# Patient Record
Sex: MALE | Race: WHITE | NOT HISPANIC OR LATINO | URBAN - METROPOLITAN AREA
[De-identification: names, ages, dates, MRNs, and addresses within clinical notes are randomized per-mention and may not be internally consistent; named-entity substitution may affect disease eponyms.]

---

## 2018-06-05 ENCOUNTER — EMERGENCY (EMERGENCY)
Facility: HOSPITAL | Age: 58
LOS: 1 days | Discharge: HOME | End: 2018-06-05
Attending: EMERGENCY MEDICINE
Payer: COMMERCIAL

## 2018-06-05 VITALS — HEIGHT: 70 IN | WEIGHT: 160.06 LBS

## 2018-06-05 VITALS
OXYGEN SATURATION: 96 % | HEART RATE: 99 BPM | TEMPERATURE: 98 F | DIASTOLIC BLOOD PRESSURE: 65 MMHG | RESPIRATION RATE: 16 BRPM | SYSTOLIC BLOOD PRESSURE: 135 MMHG

## 2018-06-05 LAB
ALBUMIN SERPL ELPH-MCNC: 4.3 G/DL — SIGNIFICANT CHANGE UP (ref 3.3–5.2)
ALP SERPL-CCNC: 124 U/L — HIGH (ref 40–120)
ALT FLD-CCNC: 45 U/L — HIGH
ANION GAP SERPL CALC-SCNC: 14 MMOL/L — SIGNIFICANT CHANGE UP (ref 5–17)
APPEARANCE UR: CLEAR — SIGNIFICANT CHANGE UP
AST SERPL-CCNC: 73 U/L — HIGH
BACTERIA # UR AUTO: ABNORMAL
BASOPHILS # BLD AUTO: 0 K/UL — SIGNIFICANT CHANGE UP (ref 0–0.2)
BASOPHILS NFR BLD AUTO: 0.4 % — SIGNIFICANT CHANGE UP (ref 0–2)
BILIRUB SERPL-MCNC: 2.5 MG/DL — HIGH (ref 0.4–2)
BILIRUB UR-MCNC: NEGATIVE — SIGNIFICANT CHANGE UP
BUN SERPL-MCNC: 9 MG/DL — SIGNIFICANT CHANGE UP (ref 8–20)
CALCIUM SERPL-MCNC: 9.4 MG/DL — SIGNIFICANT CHANGE UP (ref 8.6–10.2)
CHLORIDE SERPL-SCNC: 102 MMOL/L — SIGNIFICANT CHANGE UP (ref 98–107)
CO2 SERPL-SCNC: 25 MMOL/L — SIGNIFICANT CHANGE UP (ref 22–29)
COLOR SPEC: YELLOW — SIGNIFICANT CHANGE UP
CREAT SERPL-MCNC: 0.66 MG/DL — SIGNIFICANT CHANGE UP (ref 0.5–1.3)
DIFF PNL FLD: NEGATIVE — SIGNIFICANT CHANGE UP
EOSINOPHIL # BLD AUTO: 0.1 K/UL — SIGNIFICANT CHANGE UP (ref 0–0.5)
EOSINOPHIL NFR BLD AUTO: 1.1 % — SIGNIFICANT CHANGE UP (ref 0–5)
EPI CELLS # UR: SIGNIFICANT CHANGE UP
GLUCOSE SERPL-MCNC: 92 MG/DL — SIGNIFICANT CHANGE UP (ref 70–115)
GLUCOSE UR QL: NEGATIVE MG/DL — SIGNIFICANT CHANGE UP
HCT VFR BLD CALC: 42.2 % — SIGNIFICANT CHANGE UP (ref 42–52)
HGB BLD-MCNC: 14.3 G/DL — SIGNIFICANT CHANGE UP (ref 14–18)
KETONES UR-MCNC: ABNORMAL
LEUKOCYTE ESTERASE UR-ACNC: ABNORMAL
LYMPHOCYTES # BLD AUTO: 1.4 K/UL — SIGNIFICANT CHANGE UP (ref 1–4.8)
LYMPHOCYTES # BLD AUTO: 17.5 % — LOW (ref 20–55)
MCHC RBC-ENTMCNC: 33.9 G/DL — SIGNIFICANT CHANGE UP (ref 32–36)
MCHC RBC-ENTMCNC: 35.8 PG — HIGH (ref 27–31)
MCV RBC AUTO: 105.5 FL — HIGH (ref 80–94)
MONOCYTES # BLD AUTO: 0.8 K/UL — SIGNIFICANT CHANGE UP (ref 0–0.8)
MONOCYTES NFR BLD AUTO: 9.6 % — SIGNIFICANT CHANGE UP (ref 3–10)
NEUTROPHILS # BLD AUTO: 5.9 K/UL — SIGNIFICANT CHANGE UP (ref 1.8–8)
NEUTROPHILS NFR BLD AUTO: 71.2 % — SIGNIFICANT CHANGE UP (ref 37–73)
NITRITE UR-MCNC: NEGATIVE — SIGNIFICANT CHANGE UP
PH UR: 7 — SIGNIFICANT CHANGE UP (ref 5–8)
PLATELET # BLD AUTO: 228 K/UL — SIGNIFICANT CHANGE UP (ref 150–400)
POTASSIUM SERPL-MCNC: 4.8 MMOL/L — SIGNIFICANT CHANGE UP (ref 3.5–5.3)
POTASSIUM SERPL-SCNC: 4.8 MMOL/L — SIGNIFICANT CHANGE UP (ref 3.5–5.3)
PROT SERPL-MCNC: 7.4 G/DL — SIGNIFICANT CHANGE UP (ref 6.6–8.7)
PROT UR-MCNC: 15 MG/DL
RBC # BLD: 4 M/UL — LOW (ref 4.6–6.2)
RBC # FLD: 13.3 % — SIGNIFICANT CHANGE UP (ref 11–15.6)
RBC CASTS # UR COMP ASSIST: SIGNIFICANT CHANGE UP /HPF (ref 0–4)
SODIUM SERPL-SCNC: 141 MMOL/L — SIGNIFICANT CHANGE UP (ref 135–145)
SP GR SPEC: 1 — LOW (ref 1.01–1.02)
UROBILINOGEN FLD QL: 1 MG/DL
WBC # BLD: 8.3 K/UL — SIGNIFICANT CHANGE UP (ref 4.8–10.8)
WBC # FLD AUTO: 8.3 K/UL — SIGNIFICANT CHANGE UP (ref 4.8–10.8)
WBC UR QL: SIGNIFICANT CHANGE UP

## 2018-06-05 PROCEDURE — 74176 CT ABD & PELVIS W/O CONTRAST: CPT

## 2018-06-05 PROCEDURE — 36415 COLL VENOUS BLD VENIPUNCTURE: CPT

## 2018-06-05 PROCEDURE — 99284 EMERGENCY DEPT VISIT MOD MDM: CPT

## 2018-06-05 PROCEDURE — 80053 COMPREHEN METABOLIC PANEL: CPT

## 2018-06-05 PROCEDURE — 96374 THER/PROPH/DIAG INJ IV PUSH: CPT

## 2018-06-05 PROCEDURE — 85027 COMPLETE CBC AUTOMATED: CPT

## 2018-06-05 PROCEDURE — 74176 CT ABD & PELVIS W/O CONTRAST: CPT | Mod: 26

## 2018-06-05 PROCEDURE — 81001 URINALYSIS AUTO W/SCOPE: CPT

## 2018-06-05 PROCEDURE — 99284 EMERGENCY DEPT VISIT MOD MDM: CPT | Mod: 25

## 2018-06-05 RX ORDER — SODIUM CHLORIDE 9 MG/ML
1000 INJECTION INTRAMUSCULAR; INTRAVENOUS; SUBCUTANEOUS ONCE
Qty: 0 | Refills: 0 | Status: COMPLETED | OUTPATIENT
Start: 2018-06-05 | End: 2018-06-05

## 2018-06-05 RX ORDER — KETOROLAC TROMETHAMINE 30 MG/ML
30 SYRINGE (ML) INJECTION ONCE
Qty: 0 | Refills: 0 | Status: DISCONTINUED | OUTPATIENT
Start: 2018-06-05 | End: 2018-06-05

## 2018-06-05 RX ORDER — IBUPROFEN 200 MG
1 TABLET ORAL
Qty: 90 | Refills: 0
Start: 2018-06-05 | End: 2018-07-04

## 2018-06-05 RX ORDER — SODIUM CHLORIDE 9 MG/ML
3 INJECTION INTRAMUSCULAR; INTRAVENOUS; SUBCUTANEOUS ONCE
Qty: 0 | Refills: 0 | Status: COMPLETED | OUTPATIENT
Start: 2018-06-05 | End: 2018-06-05

## 2018-06-05 RX ORDER — CYCLOBENZAPRINE HYDROCHLORIDE 10 MG/1
1 TABLET, FILM COATED ORAL
Qty: 90 | Refills: 0
Start: 2018-06-05 | End: 2018-07-04

## 2018-06-05 RX ADMIN — Medication 30 MILLIGRAM(S): at 13:36

## 2018-06-05 RX ADMIN — SODIUM CHLORIDE 1000 MILLILITER(S): 9 INJECTION INTRAMUSCULAR; INTRAVENOUS; SUBCUTANEOUS at 13:36

## 2018-06-05 RX ADMIN — SODIUM CHLORIDE 3 MILLILITER(S): 9 INJECTION INTRAMUSCULAR; INTRAVENOUS; SUBCUTANEOUS at 13:36

## 2018-06-05 NOTE — ED PROVIDER NOTE - CHPI ED SYMPTOMS NEG
no bowel dysfunction/no motor function loss/no constipation/no neck tenderness/no bladder dysfunction

## 2018-06-05 NOTE — ED PROVIDER NOTE - PROGRESS NOTE DETAILS
pt feels much better and will be dicharged with out patient follow up in spine clinic. pt now admits to having a h/o sciatica before

## 2018-06-05 NOTE — ED ADULT NURSE NOTE - OBJECTIVE STATEMENT
Assumed pt care at 1330.  Pt awake, alert and oriented x3 c/o left flank pain radiating to left groin.  denies hematuria, dysuria or urinary frequency.  denies n/v/d.  denies fever or chills.

## 2018-06-05 NOTE — ED PROVIDER NOTE - CARE PLAN
Principal Discharge DX:	Acute left-sided low back pain with left-sided sciatica  Secondary Diagnosis:	Dehydration

## 2020-03-06 ENCOUNTER — APPOINTMENT (OUTPATIENT)
Dept: FAMILY MEDICINE | Facility: CLINIC | Age: 60
End: 2020-03-06
Payer: COMMERCIAL

## 2020-03-06 VITALS
RESPIRATION RATE: 13 BRPM | HEIGHT: 69 IN | WEIGHT: 155 LBS | TEMPERATURE: 98.9 F | OXYGEN SATURATION: 98 % | HEART RATE: 66 BPM | SYSTOLIC BLOOD PRESSURE: 130 MMHG | DIASTOLIC BLOOD PRESSURE: 74 MMHG | BODY MASS INDEX: 22.96 KG/M2

## 2020-03-06 DIAGNOSIS — J06.9 ACUTE UPPER RESPIRATORY INFECTION, UNSPECIFIED: ICD-10-CM

## 2020-03-06 PROBLEM — Z00.00 ENCOUNTER FOR PREVENTIVE HEALTH EXAMINATION: Status: ACTIVE | Noted: 2020-03-06

## 2020-03-06 LAB
FLUAV SPEC QL CULT: NORMAL
FLUBV AG SPEC QL IA: NORMAL

## 2020-03-06 PROCEDURE — 99203 OFFICE O/P NEW LOW 30 MIN: CPT | Mod: 25

## 2020-03-06 PROCEDURE — 87804 INFLUENZA ASSAY W/OPTIC: CPT | Mod: 59,QW

## 2020-03-06 NOTE — HEALTH RISK ASSESSMENT
[] : Yes [30 or more] : 30 or more [No] : In the past 12 months have you used drugs other than those required for medical reasons? No [No falls in past year] : Patient reported no falls in the past year [0] : 2) Feeling down, depressed, or hopeless: Not at all (0) [de-identified] : 1PPD [Audit-CScore] : 0 [AQM2Fsfog] : 0

## 2020-03-06 NOTE — REVIEW OF SYSTEMS
[Fever] : fever [Chills] : chills [Postnasal Drip] : postnasal drip [Nasal Discharge] : nasal discharge [Cough] : cough [Negative] : Heme/Lymph

## 2020-03-06 NOTE — HISTORY OF PRESENT ILLNESS
[FreeTextEntry8] : 59-year-old male, new to this office, here for an acute visit.\par Patient states he was done with URI, sneezing, coughing up yellow phlegm,fever for the past 3 days.He did not go to work. He is here today, feeling well, he says his symptoms have all subsided. He wants a note for his job stating that he was unwell to go to work.\par patient has no significant past medical or surgical history.\par He is every day smoker one pack a day for the past 40 years.\par he is on no current medication. [Spouse] : spouse

## 2020-03-06 NOTE — ASSESSMENT
[FreeTextEntry1] : 59-year-old male, new to this office, here for an acute visit.\par Patient states he was down with URI, sneezing, coughing up yellow phlegm,fever for the past 3 days.He did not go to work. He is here today, feeling well, he says his symptoms have all subsided. He wants a note for his job stating that he was unwell to go to work.\par Physical exam is normal today\par Rapid flu is negative\par Patient given a note that he was seen here today and is well to return to work tomorrow\par

## 2020-12-23 PROBLEM — J06.9 ACUTE URI: Status: RESOLVED | Noted: 2020-03-06 | Resolved: 2020-12-23

## 2021-01-27 ENCOUNTER — TRANSCRIPTION ENCOUNTER (OUTPATIENT)
Age: 61
End: 2021-01-27

## 2021-02-07 ENCOUNTER — TRANSCRIPTION ENCOUNTER (OUTPATIENT)
Age: 61
End: 2021-02-07

## 2021-02-17 ENCOUNTER — TRANSCRIPTION ENCOUNTER (OUTPATIENT)
Age: 61
End: 2021-02-17

## 2021-02-26 ENCOUNTER — APPOINTMENT (OUTPATIENT)
Dept: FAMILY MEDICINE | Facility: CLINIC | Age: 61
End: 2021-02-26
Payer: COMMERCIAL

## 2021-02-26 VITALS
DIASTOLIC BLOOD PRESSURE: 70 MMHG | HEIGHT: 69 IN | HEART RATE: 73 BPM | SYSTOLIC BLOOD PRESSURE: 130 MMHG | OXYGEN SATURATION: 98 % | TEMPERATURE: 99.3 F | RESPIRATION RATE: 16 BRPM | BODY MASS INDEX: 21.62 KG/M2 | WEIGHT: 146 LBS

## 2021-02-26 DIAGNOSIS — Z87.09 PERSONAL HISTORY OF OTHER DISEASES OF THE RESPIRATORY SYSTEM: ICD-10-CM

## 2021-02-26 DIAGNOSIS — R05 COUGH: ICD-10-CM

## 2021-02-26 DIAGNOSIS — R50.9 FEVER, UNSPECIFIED: ICD-10-CM

## 2021-02-26 PROCEDURE — 99214 OFFICE O/P EST MOD 30 MIN: CPT

## 2021-02-26 PROCEDURE — 99072 ADDL SUPL MATRL&STAF TM PHE: CPT

## 2021-03-01 LAB — SARS-COV-2 N GENE NPH QL NAA+PROBE: NOT DETECTED

## 2021-04-27 ENCOUNTER — APPOINTMENT (OUTPATIENT)
Dept: FAMILY MEDICINE | Facility: CLINIC | Age: 61
End: 2021-04-27
Payer: COMMERCIAL

## 2021-04-27 VITALS
DIASTOLIC BLOOD PRESSURE: 72 MMHG | OXYGEN SATURATION: 98 % | SYSTOLIC BLOOD PRESSURE: 126 MMHG | TEMPERATURE: 98 F | BODY MASS INDEX: 21.62 KG/M2 | HEIGHT: 69 IN | RESPIRATION RATE: 16 BRPM | HEART RATE: 77 BPM | WEIGHT: 146 LBS

## 2021-04-27 DIAGNOSIS — R51.9 HEADACHE, UNSPECIFIED: ICD-10-CM

## 2021-04-27 PROCEDURE — 99213 OFFICE O/P EST LOW 20 MIN: CPT

## 2021-04-27 PROCEDURE — 99072 ADDL SUPL MATRL&STAF TM PHE: CPT

## 2021-07-27 ENCOUNTER — EMERGENCY (EMERGENCY)
Facility: HOSPITAL | Age: 61
LOS: 1 days | Discharge: HOME | End: 2021-07-27
Attending: EMERGENCY MEDICINE
Payer: COMMERCIAL

## 2021-07-27 VITALS
HEART RATE: 58 BPM | DIASTOLIC BLOOD PRESSURE: 100 MMHG | TEMPERATURE: 98 F | RESPIRATION RATE: 20 BRPM | OXYGEN SATURATION: 100 % | SYSTOLIC BLOOD PRESSURE: 165 MMHG | HEIGHT: 70 IN

## 2021-07-27 VITALS
SYSTOLIC BLOOD PRESSURE: 137 MMHG | HEART RATE: 54 BPM | OXYGEN SATURATION: 100 % | TEMPERATURE: 98 F | RESPIRATION RATE: 20 BRPM | DIASTOLIC BLOOD PRESSURE: 86 MMHG

## 2021-07-27 DIAGNOSIS — Z98.890 OTHER SPECIFIED POSTPROCEDURAL STATES: Chronic | ICD-10-CM

## 2021-07-27 LAB
ALBUMIN SERPL ELPH-MCNC: 3.9 G/DL — SIGNIFICANT CHANGE UP (ref 3.3–5.2)
ALP SERPL-CCNC: 145 U/L — HIGH (ref 40–120)
ALT FLD-CCNC: 31 U/L — SIGNIFICANT CHANGE UP
ANION GAP SERPL CALC-SCNC: 9 MMOL/L — SIGNIFICANT CHANGE UP (ref 5–17)
ANISOCYTOSIS BLD QL: SLIGHT — SIGNIFICANT CHANGE UP
AST SERPL-CCNC: 58 U/L — HIGH
BASOPHILS # BLD AUTO: 0.04 K/UL — SIGNIFICANT CHANGE UP (ref 0–0.2)
BASOPHILS NFR BLD AUTO: 0.8 % — SIGNIFICANT CHANGE UP (ref 0–2)
BILIRUB SERPL-MCNC: 1.4 MG/DL — SIGNIFICANT CHANGE UP (ref 0.4–2)
BUN SERPL-MCNC: 8 MG/DL — SIGNIFICANT CHANGE UP (ref 8–20)
CALCIUM SERPL-MCNC: 9.6 MG/DL — SIGNIFICANT CHANGE UP (ref 8.6–10.2)
CHLORIDE SERPL-SCNC: 103 MMOL/L — SIGNIFICANT CHANGE UP (ref 98–107)
CO2 SERPL-SCNC: 26 MMOL/L — SIGNIFICANT CHANGE UP (ref 22–29)
CREAT SERPL-MCNC: 0.84 MG/DL — SIGNIFICANT CHANGE UP (ref 0.5–1.3)
DACRYOCYTES BLD QL SMEAR: SLIGHT — SIGNIFICANT CHANGE UP
ELLIPTOCYTES BLD QL SMEAR: SLIGHT — SIGNIFICANT CHANGE UP
EOSINOPHIL # BLD AUTO: 0.11 K/UL — SIGNIFICANT CHANGE UP (ref 0–0.5)
EOSINOPHIL NFR BLD AUTO: 2.1 % — SIGNIFICANT CHANGE UP (ref 0–6)
GIANT PLATELETS BLD QL SMEAR: PRESENT — SIGNIFICANT CHANGE UP
GLUCOSE SERPL-MCNC: 89 MG/DL — SIGNIFICANT CHANGE UP (ref 70–99)
HCT VFR BLD CALC: 39.2 % — SIGNIFICANT CHANGE UP (ref 39–50)
HGB BLD-MCNC: 13.9 G/DL — SIGNIFICANT CHANGE UP (ref 13–17)
HYPOCHROMIA BLD QL: SLIGHT — SIGNIFICANT CHANGE UP
IMM GRANULOCYTES NFR BLD AUTO: 0.4 % — SIGNIFICANT CHANGE UP (ref 0–1.5)
LYMPHOCYTES # BLD AUTO: 1.78 K/UL — SIGNIFICANT CHANGE UP (ref 1–3.3)
LYMPHOCYTES # BLD AUTO: 34 % — SIGNIFICANT CHANGE UP (ref 13–44)
MACROCYTES BLD QL: SLIGHT — SIGNIFICANT CHANGE UP
MANUAL SMEAR VERIFICATION: SIGNIFICANT CHANGE UP
MCHC RBC-ENTMCNC: 35.5 GM/DL — SIGNIFICANT CHANGE UP (ref 32–36)
MCHC RBC-ENTMCNC: 38.5 PG — HIGH (ref 27–34)
MCV RBC AUTO: 108.6 FL — HIGH (ref 80–100)
MICROCYTES BLD QL: SLIGHT — SIGNIFICANT CHANGE UP
MONOCYTES # BLD AUTO: 0.54 K/UL — SIGNIFICANT CHANGE UP (ref 0–0.9)
MONOCYTES NFR BLD AUTO: 10.3 % — SIGNIFICANT CHANGE UP (ref 2–14)
NEUTROPHILS # BLD AUTO: 2.74 K/UL — SIGNIFICANT CHANGE UP (ref 1.8–7.4)
NEUTROPHILS NFR BLD AUTO: 52.4 % — SIGNIFICANT CHANGE UP (ref 43–77)
NRBC # BLD: 1 /100 — HIGH (ref 0–0)
OVALOCYTES BLD QL SMEAR: SLIGHT — SIGNIFICANT CHANGE UP
PLAT MORPH BLD: NORMAL — SIGNIFICANT CHANGE UP
PLATELET # BLD AUTO: 261 K/UL — SIGNIFICANT CHANGE UP (ref 150–400)
PLATELET COUNT - ESTIMATE: NORMAL — SIGNIFICANT CHANGE UP
POIKILOCYTOSIS BLD QL AUTO: SLIGHT — SIGNIFICANT CHANGE UP
POTASSIUM SERPL-MCNC: 4.4 MMOL/L — SIGNIFICANT CHANGE UP (ref 3.5–5.3)
POTASSIUM SERPL-SCNC: 4.4 MMOL/L — SIGNIFICANT CHANGE UP (ref 3.5–5.3)
PROT SERPL-MCNC: 6.8 G/DL — SIGNIFICANT CHANGE UP (ref 6.6–8.7)
RBC # BLD: 3.61 M/UL — LOW (ref 4.2–5.8)
RBC # FLD: 14.4 % — SIGNIFICANT CHANGE UP (ref 10.3–14.5)
RBC BLD AUTO: NORMAL — SIGNIFICANT CHANGE UP
SODIUM SERPL-SCNC: 138 MMOL/L — SIGNIFICANT CHANGE UP (ref 135–145)
TROPONIN T SERPL-MCNC: <0.01 NG/ML — SIGNIFICANT CHANGE UP (ref 0–0.06)
VARIANT LYMPHS # BLD: 6.9 % — HIGH (ref 0–6)
WBC # BLD: 5.23 K/UL — SIGNIFICANT CHANGE UP (ref 3.8–10.5)
WBC # FLD AUTO: 5.23 K/UL — SIGNIFICANT CHANGE UP (ref 3.8–10.5)

## 2021-07-27 PROCEDURE — 85025 COMPLETE CBC W/AUTO DIFF WBC: CPT

## 2021-07-27 PROCEDURE — 93005 ELECTROCARDIOGRAM TRACING: CPT

## 2021-07-27 PROCEDURE — 36415 COLL VENOUS BLD VENIPUNCTURE: CPT

## 2021-07-27 PROCEDURE — 99285 EMERGENCY DEPT VISIT HI MDM: CPT

## 2021-07-27 PROCEDURE — 70450 CT HEAD/BRAIN W/O DYE: CPT | Mod: 26,MG

## 2021-07-27 PROCEDURE — G1004: CPT

## 2021-07-27 PROCEDURE — 96360 HYDRATION IV INFUSION INIT: CPT

## 2021-07-27 PROCEDURE — 93010 ELECTROCARDIOGRAM REPORT: CPT

## 2021-07-27 PROCEDURE — 84484 ASSAY OF TROPONIN QUANT: CPT

## 2021-07-27 PROCEDURE — 70450 CT HEAD/BRAIN W/O DYE: CPT | Mod: MG

## 2021-07-27 PROCEDURE — 80053 COMPREHEN METABOLIC PANEL: CPT

## 2021-07-27 PROCEDURE — 99284 EMERGENCY DEPT VISIT MOD MDM: CPT | Mod: 25

## 2021-07-27 PROCEDURE — 71046 X-RAY EXAM CHEST 2 VIEWS: CPT

## 2021-07-27 PROCEDURE — 71046 X-RAY EXAM CHEST 2 VIEWS: CPT | Mod: 26

## 2021-07-27 RX ORDER — SODIUM CHLORIDE 9 MG/ML
1000 INJECTION INTRAMUSCULAR; INTRAVENOUS; SUBCUTANEOUS ONCE
Refills: 0 | Status: COMPLETED | OUTPATIENT
Start: 2021-07-27 | End: 2021-07-27

## 2021-07-27 RX ADMIN — SODIUM CHLORIDE 1000 MILLILITER(S): 9 INJECTION INTRAMUSCULAR; INTRAVENOUS; SUBCUTANEOUS at 09:56

## 2021-07-27 RX ADMIN — SODIUM CHLORIDE 1000 MILLILITER(S): 9 INJECTION INTRAMUSCULAR; INTRAVENOUS; SUBCUTANEOUS at 10:56

## 2021-07-27 NOTE — ED PROVIDER NOTE - PATIENT PORTAL LINK FT
You can access the FollowMyHealth Patient Portal offered by [facility] by registering at the following website: [email or web address]. By joining [employer]’s FollowMyHealth portal, you will also be able to view your health information using other applications (apps) compatible with our system.

## 2021-07-27 NOTE — ED PROVIDER NOTE - OBJECTIVE STATEMENT
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 Thinks he was unconscious for approximately 15 seconds. Denies tongue biting/urinary incontinence/shaking. Did not seek medical attention at that time. Patient states that today, he was working outside when he passed out again. Feels tired and jittery with headache located posteriorly.

## 2021-07-27 NOTE — ED PROVIDER NOTE - ATTENDING CONTRIBUTION TO CARE
HPI/PE/ROS/MDM by me.     I performed a history and physical exam of the patient and discussed their management with the resident. I reviewed the resident's note and agree with the documented findings and plan of care. My medical decision making and observations are found above.

## 2021-07-27 NOTE — ED PROVIDER NOTE - PHYSICAL EXAMINATION
Gen: NAD, AOx3  Head: NCAT  Lung: CTAB, no respiratory distress, no wheezing, rales, rhonchi  CV: normal s1/s2, rrr, no murmurs, Normal perfusion  Abd: soft, NTND  MSK: No edema, no visible deformities, full range of motion in all 4 extremities  Neuro: No focal neurologic deficits  Skin: No rash   Psych: normal affect

## 2021-07-27 NOTE — ED PROVIDER NOTE - CARE PROVIDER_API CALL
Yair Gustafson)  Otolaryngology  [address] [place] [address]  Phone: [phone]  Fax: [phone]  Follow Up Time: Routine

## 2021-07-27 NOTE — ED PROVIDER NOTE - PROGRESS NOTE DETAILS
Patient feeling much better after IV fluids. Will dc. Patient informed of all incidental findings, will dc home with cardiology follow up. Ileana Rendon DO

## 2021-07-27 NOTE — ED ADULT NURSE NOTE - OBJECTIVE STATEMENT
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    denies hitting head.   pt 'just drinks juice in the morning' 'first meal is always about at noon'   REC'D BOTH COVID VACCINES.

## 2021-07-27 NOTE — ED PROVIDER NOTE - CLINICAL SUMMARY MEDICAL DECISION MAKING FREE TEXT BOX
59yo male with syncopal episode- +orthostatic hypotension, give fluids, EKG WNL, labs, reassess. Ileana Rnedon DO

## 2021-07-27 NOTE — ED PROVIDER NOTE - NSFOLLOWUPCLINICS_GEN_ALL_ED_FT
[facility]  Cardiology  [address] [place] [address]  Phone: [phone]  Fax:   Follow Up Time: 1-3 Days

## 2021-07-29 ENCOUNTER — INPATIENT (INPATIENT)
Facility: HOSPITAL | Age: 61
LOS: 4 days | Discharge: HOME | DRG: 244 | End: 2021-08-03
Attending: STUDENT IN AN ORGANIZED HEALTH CARE EDUCATION/TRAINING PROGRAM | Admitting: HOSPITALIST
Payer: COMMERCIAL

## 2021-07-29 VITALS
SYSTOLIC BLOOD PRESSURE: 122 MMHG | TEMPERATURE: 98 F | DIASTOLIC BLOOD PRESSURE: 72 MMHG | WEIGHT: 147.93 LBS | HEIGHT: 70 IN | HEART RATE: 61 BPM | RESPIRATION RATE: 20 BRPM | OXYGEN SATURATION: 100 %

## 2021-07-29 DIAGNOSIS — R42 DIZZINESS AND GIDDINESS: ICD-10-CM

## 2021-07-29 DIAGNOSIS — Z98.890 OTHER SPECIFIED POSTPROCEDURAL STATES: Chronic | ICD-10-CM

## 2021-07-29 LAB
ALBUMIN SERPL ELPH-MCNC: 4.1 G/DL — SIGNIFICANT CHANGE UP (ref 3.3–5.2)
ALP SERPL-CCNC: 145 U/L — HIGH (ref 40–120)
ALT FLD-CCNC: 27 U/L — SIGNIFICANT CHANGE UP
ANION GAP SERPL CALC-SCNC: 11 MMOL/L — SIGNIFICANT CHANGE UP (ref 5–17)
ANISOCYTOSIS BLD QL: SLIGHT — SIGNIFICANT CHANGE UP
APTT BLD: 27.9 SEC — SIGNIFICANT CHANGE UP (ref 27.5–35.5)
AST SERPL-CCNC: 41 U/L — HIGH
BASE EXCESS BLDV CALC-SCNC: 2.4 MMOL/L — SIGNIFICANT CHANGE UP (ref -2–3)
BASOPHILS # BLD AUTO: 0 K/UL — SIGNIFICANT CHANGE UP (ref 0–0.2)
BASOPHILS NFR BLD AUTO: 0 % — SIGNIFICANT CHANGE UP (ref 0–2)
BILIRUB SERPL-MCNC: 1.1 MG/DL — SIGNIFICANT CHANGE UP (ref 0.4–2)
BUN SERPL-MCNC: 7.8 MG/DL — LOW (ref 8–20)
BURR CELLS BLD QL SMEAR: SLIGHT — SIGNIFICANT CHANGE UP
CALCIUM SERPL-MCNC: 9.4 MG/DL — SIGNIFICANT CHANGE UP (ref 8.6–10.2)
CHLORIDE SERPL-SCNC: 104 MMOL/L — SIGNIFICANT CHANGE UP (ref 98–107)
CHOLEST SERPL-MCNC: 173 MG/DL — SIGNIFICANT CHANGE UP
CO2 SERPL-SCNC: 25 MMOL/L — SIGNIFICANT CHANGE UP (ref 22–29)
CREAT SERPL-MCNC: 0.82 MG/DL — SIGNIFICANT CHANGE UP (ref 0.5–1.3)
ELLIPTOCYTES BLD QL SMEAR: SLIGHT — SIGNIFICANT CHANGE UP
EOSINOPHIL # BLD AUTO: 0.18 K/UL — SIGNIFICANT CHANGE UP (ref 0–0.5)
EOSINOPHIL NFR BLD AUTO: 2.7 % — SIGNIFICANT CHANGE UP (ref 0–6)
GAS PNL BLDV: SIGNIFICANT CHANGE UP
GIANT PLATELETS BLD QL SMEAR: PRESENT — SIGNIFICANT CHANGE UP
GLUCOSE SERPL-MCNC: 98 MG/DL — SIGNIFICANT CHANGE UP (ref 70–99)
HCO3 BLDV-SCNC: 28 MMOL/L — SIGNIFICANT CHANGE UP (ref 22–29)
HCT VFR BLD CALC: 40.8 % — SIGNIFICANT CHANGE UP (ref 39–50)
HDLC SERPL-MCNC: 57 MG/DL — SIGNIFICANT CHANGE UP
HGB BLD-MCNC: 14.2 G/DL — SIGNIFICANT CHANGE UP (ref 13–17)
INR BLD: 1.14 RATIO — SIGNIFICANT CHANGE UP (ref 0.88–1.16)
LIPID PNL WITH DIRECT LDL SERPL: 102 MG/DL — HIGH
LYMPHOCYTES # BLD AUTO: 0.58 K/UL — LOW (ref 1–3.3)
LYMPHOCYTES # BLD AUTO: 8.9 % — LOW (ref 13–44)
MACROCYTES BLD QL: SLIGHT — SIGNIFICANT CHANGE UP
MANUAL SMEAR VERIFICATION: SIGNIFICANT CHANGE UP
MCHC RBC-ENTMCNC: 34.8 GM/DL — SIGNIFICANT CHANGE UP (ref 32–36)
MCHC RBC-ENTMCNC: 38.7 PG — HIGH (ref 27–34)
MCV RBC AUTO: 111.2 FL — HIGH (ref 80–100)
MICROCYTES BLD QL: SLIGHT — SIGNIFICANT CHANGE UP
MONOCYTES # BLD AUTO: 0.7 K/UL — SIGNIFICANT CHANGE UP (ref 0–0.9)
MONOCYTES NFR BLD AUTO: 10.7 % — SIGNIFICANT CHANGE UP (ref 2–14)
NEUTROPHILS # BLD AUTO: 4.77 K/UL — SIGNIFICANT CHANGE UP (ref 1.8–7.4)
NEUTROPHILS NFR BLD AUTO: 73.2 % — SIGNIFICANT CHANGE UP (ref 43–77)
NON HDL CHOLESTEROL: 116 MG/DL — SIGNIFICANT CHANGE UP
OVALOCYTES BLD QL SMEAR: SLIGHT — SIGNIFICANT CHANGE UP
PCO2 BLDV: 48 MMHG — SIGNIFICANT CHANGE UP (ref 42–55)
PH BLDV: 7.37 — SIGNIFICANT CHANGE UP (ref 7.32–7.43)
PLAT MORPH BLD: NORMAL — SIGNIFICANT CHANGE UP
PLATELET # BLD AUTO: 257 K/UL — SIGNIFICANT CHANGE UP (ref 150–400)
PLATELET COUNT - ESTIMATE: NORMAL — SIGNIFICANT CHANGE UP
PO2 BLDV: <42 MMHG — SIGNIFICANT CHANGE UP (ref 25–45)
POIKILOCYTOSIS BLD QL AUTO: SLIGHT — SIGNIFICANT CHANGE UP
POLYCHROMASIA BLD QL SMEAR: SLIGHT — SIGNIFICANT CHANGE UP
POTASSIUM SERPL-MCNC: 4.4 MMOL/L — SIGNIFICANT CHANGE UP (ref 3.5–5.3)
POTASSIUM SERPL-SCNC: 4.4 MMOL/L — SIGNIFICANT CHANGE UP (ref 3.5–5.3)
PROT SERPL-MCNC: 7.1 G/DL — SIGNIFICANT CHANGE UP (ref 6.6–8.7)
PROTHROM AB SERPL-ACNC: 13.1 SEC — SIGNIFICANT CHANGE UP (ref 10.6–13.6)
RBC # BLD: 3.67 M/UL — LOW (ref 4.2–5.8)
RBC # FLD: 14.5 % — SIGNIFICANT CHANGE UP (ref 10.3–14.5)
RBC BLD AUTO: NORMAL — SIGNIFICANT CHANGE UP
SAO2 % BLDV: 52.4 % — SIGNIFICANT CHANGE UP
SODIUM SERPL-SCNC: 139 MMOL/L — SIGNIFICANT CHANGE UP (ref 135–145)
T4 AB SER-ACNC: 6.7 UG/DL — SIGNIFICANT CHANGE UP (ref 4.5–12)
TRIGL SERPL-MCNC: 71 MG/DL — SIGNIFICANT CHANGE UP
TROPONIN T SERPL-MCNC: <0.01 NG/ML — SIGNIFICANT CHANGE UP (ref 0–0.06)
TSH SERPL-MCNC: 1.71 UIU/ML — SIGNIFICANT CHANGE UP (ref 0.27–4.2)
VARIANT LYMPHS # BLD: 4.5 % — SIGNIFICANT CHANGE UP (ref 0–6)
WBC # BLD: 6.52 K/UL — SIGNIFICANT CHANGE UP (ref 3.8–10.5)
WBC # FLD AUTO: 6.52 K/UL — SIGNIFICANT CHANGE UP (ref 3.8–10.5)

## 2021-07-29 PROCEDURE — 70496 CT ANGIOGRAPHY HEAD: CPT | Mod: 26,MA

## 2021-07-29 PROCEDURE — 0042T: CPT

## 2021-07-29 PROCEDURE — 99223 1ST HOSP IP/OBS HIGH 75: CPT

## 2021-07-29 PROCEDURE — 99254 IP/OBS CNSLTJ NEW/EST MOD 60: CPT

## 2021-07-29 PROCEDURE — 70551 MRI BRAIN STEM W/O DYE: CPT | Mod: 26,MA

## 2021-07-29 PROCEDURE — 70498 CT ANGIOGRAPHY NECK: CPT | Mod: 26,MA

## 2021-07-29 PROCEDURE — 93306 TTE W/DOPPLER COMPLETE: CPT | Mod: 26

## 2021-07-29 PROCEDURE — 93010 ELECTROCARDIOGRAM REPORT: CPT | Mod: 76

## 2021-07-29 PROCEDURE — 99234 HOSP IP/OBS SM DT SF/LOW 45: CPT

## 2021-07-29 PROCEDURE — 70450 CT HEAD/BRAIN W/O DYE: CPT | Mod: 26,59,MA

## 2021-07-29 RX ORDER — ONDANSETRON 8 MG/1
4 TABLET, FILM COATED ORAL EVERY 8 HOURS
Refills: 0 | Status: DISCONTINUED | OUTPATIENT
Start: 2021-07-29 | End: 2021-08-03

## 2021-07-29 RX ORDER — SODIUM CHLORIDE 9 MG/ML
1000 INJECTION, SOLUTION INTRAVENOUS ONCE
Refills: 0 | Status: COMPLETED | OUTPATIENT
Start: 2021-07-29 | End: 2021-07-29

## 2021-07-29 RX ORDER — ENOXAPARIN SODIUM 100 MG/ML
40 INJECTION SUBCUTANEOUS DAILY
Refills: 0 | Status: DISCONTINUED | OUTPATIENT
Start: 2021-07-29 | End: 2021-08-01

## 2021-07-29 RX ORDER — SODIUM CHLORIDE 9 MG/ML
1000 INJECTION, SOLUTION INTRAVENOUS
Refills: 0 | Status: DISCONTINUED | OUTPATIENT
Start: 2021-07-29 | End: 2021-08-01

## 2021-07-29 RX ORDER — ACETAMINOPHEN 500 MG
650 TABLET ORAL EVERY 6 HOURS
Refills: 0 | Status: DISCONTINUED | OUTPATIENT
Start: 2021-07-29 | End: 2021-08-03

## 2021-07-29 RX ORDER — ASPIRIN/CALCIUM CARB/MAGNESIUM 324 MG
324 TABLET ORAL ONCE
Refills: 0 | Status: COMPLETED | OUTPATIENT
Start: 2021-07-29 | End: 2021-07-29

## 2021-07-29 RX ORDER — ATROPINE SULFATE 0.1 MG/ML
0.5 SYRINGE (ML) INJECTION ONCE
Refills: 0 | Status: DISCONTINUED | OUTPATIENT
Start: 2021-07-29 | End: 2021-08-02

## 2021-07-29 RX ORDER — CLOPIDOGREL BISULFATE 75 MG/1
75 TABLET, FILM COATED ORAL ONCE
Refills: 0 | Status: COMPLETED | OUTPATIENT
Start: 2021-07-29 | End: 2021-07-29

## 2021-07-29 RX ADMIN — CLOPIDOGREL BISULFATE 75 MILLIGRAM(S): 75 TABLET, FILM COATED ORAL at 10:36

## 2021-07-29 RX ADMIN — Medication 324 MILLIGRAM(S): at 10:36

## 2021-07-29 RX ADMIN — SODIUM CHLORIDE 100 MILLILITER(S): 9 INJECTION, SOLUTION INTRAVENOUS at 15:51

## 2021-07-29 RX ADMIN — SODIUM CHLORIDE 1000 MILLILITER(S): 9 INJECTION, SOLUTION INTRAVENOUS at 18:17

## 2021-07-29 NOTE — CONSULT NOTE ADULT - ATTENDING COMMENTS
Seen and examined, agree with above unless specified below  No prior similar episodes and no known family Hx of malignant arrhythmia  One mild episode c/w sinus slowing, followed by SB and one drop P wave, followed by SB then ST suggestive of vagally mediated SB/sinus pause with intermittent AVB. On the other hand patient had several episodes without any clear trigger. He also has unexplained orthostatic drop in his BP    Suggest an admission and 2-3 days monitoring for better symptoms/rhythm correlation  - TTE  - Tele  - Neuro input  - Lyme serology as in rare cases this may cause SA exit block/sinus anamika, though pt denies any tick bites or rash  - TSH  - If clear evidence of symptomatic irreversible sinus node dysfunction or severe cardio-inhibitory vagal syncope, then PM with rate drop features can be considered. Explained this to the pt. He would like to avoid a PPM if needed but will be open for one if needed    will follow up
Patient was seen and examined at bedside and agree with above plan  EKG reviewed NSR @ 65 bpm   Telemetry: sinus bradycardia @ 39 bpm     Labs: TSH 1.17  Troponin negative x 3     Dx: Syncope/ Pre-syncope likely vasovagal vs seizure vs bradyarrhythmia,   - patient notes that while here in the ER has had 2 episodes of near syncopal episode with HR in the 30 bpm, no evidence of blocks seen but the rhythm was undulating unable to visualize   - Troponin negative x 3 with normal TSH and no EKG changes consistent with ischaemia   - continue with telemetry monitoring   -  TTE reviewed and normal LVEF with no evidence of wall motion abnormalities   - Neurology eval MRI done shows no evidence of acute stroke would like to hear input regarding possible seizures as a differential   - will obtain Cardiac CTA in the AM   - will invite EP for input and possible ILR   - will follow up tomorrow     Zoey Herzog D.O. [facility]  Cardiology/Vascular Cardiology -[facility]   Telephone # [phone]

## 2021-07-29 NOTE — ED PROVIDER NOTE - CARE PLAN
Principal Discharge DX:	Dizziness  Secondary Diagnosis:	Paresthesia  Secondary Diagnosis:	Vertebral artery occlusion

## 2021-07-29 NOTE — H&P ADULT - NSHPPHYSICALEXAM_GEN_ALL_CORE
PHYSICAL EXAM:  GENERAL: NAD, well-developed  HEAD:  Atraumatic, Normocephalic  EYES: EOMI, PERRLA, conjunctiva and sclera clear  NECK: Supple, No JVD  CHEST/LUNG: Clear to auscultation bilaterally; No wheeze  HEART:  bradycardic to 50, regular, no murmur  ABDOMEN: Soft, Nontender, Nondistended; Bowel sounds present  EXTREMITIES:  2+ Peripheral Pulses, No clubbing, cyanosis, or edema  PSYCH: AAOx3  NEUROLOGY: non-focal  SKIN: No rashes or lesions

## 2021-07-29 NOTE — ED ADULT NURSE NOTE - OBJECTIVE STATEMENT
Pt c/o episodes of dizziness worsening over the last week with periods of R sided arm pain.  Pt denies symptoms at this time, no acute s/s of respiratory distress noted or reported, will continue to monitor

## 2021-07-29 NOTE — ED PROVIDER NOTE - PROGRESS NOTE DETAILS
CT/CTA/perfusion results as noted and reviewed with Radiology.  case d/w Interventional Neuro/Dr. Moffett and recommending starting ASA and Plavix and consult Neuro.  Case d/w Dr. Kidd and requesting MRI  and will see pt in consult CT/CTA/perfusion results as noted and reviewed with Radiology.  case d/w Interventional Neuro/Dr. Moffett and recommending starting ASA and Plavix and consult Neuro.  Case d/w Dr. Kidd and requesting MRI  and will see pt in consult.  will place on OBS

## 2021-07-29 NOTE — H&P ADULT - NSHPSOCIALHISTORY_GEN_ALL_CORE
Smokes 1ppd X35 years  Drinks 6 cans of beer a day 12ounce cans, last alcohol drink was 7 days ago  Denies any hx of drug abuse

## 2021-07-29 NOTE — ED PROVIDER NOTE - CHPI ED SYMPTOMS NEG
no confusion/no fever/no loss of consciousness/no nausea/no vomiting/no weakness/no change in level of consciousness

## 2021-07-29 NOTE — ED ADULT NURSE NOTE - CHIEF COMPLAINT QUOTE
Patient seen and discharged on Tuesday for dizziness, presents C/O same symptoms, reports near syncopal episode yesterday, denies CP, heavy smoker.

## 2021-07-29 NOTE — ED ADULT TRIAGE NOTE - CHIEF COMPLAINT QUOTE
Patient seen and discharged on Tuesday for dizziness, presents C/O same symptoms, reports near syncopal episode yesterday, denies CP Patient seen and discharged on Tuesday for dizziness, presents C/O same symptoms, reports near syncopal episode yesterday, denies CP, heavy smoker.

## 2021-07-29 NOTE — ED STATDOCS - PROGRESS NOTE DETAILS
59 y/o M pt with significant PMHx of presents to the ED c/o dizziness and tingling in right arm and fingers. Pt was seen in [facility] Tuesday for dizziness, fainting, diaphoresis, and tingling. He also notes episode of fainting on Saturday and Wednesday. He also notes intense headaches on the back right side of his head and notes pressure behind his right eye. He was seen in [facility] Tuesday morning and given fluids, CXR, CT scans, labs but symptoms keep reoccurring. NIHSS 0  Focused eval, protocol orders entered. Pt to be moved to main ED for further evaluation by another provider. 59 y/o M pt with significant PMHx of presents to the ED c/o dizziness, fainting episodes and tingling in right arm and fingers. Reports episodes of spinning lasting 30 seconds to 1 minutes, no idetifiable trigger.  Also notes tingling of right arm with tingling of fingertips of both hands.  Also reports episodes of fainting on saturday and wednesday morning.  He also notes intense headaches on the back right side of his head and notes pressure behind his right eye. He was seen in [facility] Tuesday morning and given fluids, CXR, CT scans, labs but symptoms keep reoccurring. PE:  NIHSS 0  Focused eval, protocol orders entered. Pt to be moved to main ED for further evaluation by another provider.

## 2021-07-29 NOTE — ED CDU PROVIDER DISPOSITION NOTE - CLINICAL COURSE
66 yo male with intermittent dizziness presyncopal and syncopal episodes placed in observation initially for TIa eval found to have vertebral artery occulusion at request of neuro MRI ordered and found to be negative for acute stroke. pt found to have episodic bradycardia and [facility]. [facility] [place] [facility] and ep as well. ep recommending admission for further monitoring.

## 2021-07-29 NOTE — ED CDU PROVIDER DISPOSITION NOTE - ATTENDING CONTRIBUTION TO CARE
Patient seen with PA.  will admit given need for increased diagnostic and interventions.  VSS.  Non toxic.  Well appearing. Uneventful ED observation period.

## 2021-07-29 NOTE — CONSULT NOTE ADULT - SUBJECTIVE AND OBJECTIVE BOX
60 year male patient, current smoker, with no known medical history who presents once again with syncope. 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 He states he LOC for about 30 seconds, then he woke up with diaphoresis, and a headache. Hedrank water, and felt better afterwards. On , patient felt same symptoms without LOC, while sitting in a chair-symptoms lasted 1 minute in duration. , pt had dizziness while sitting in bed without getting up, no LOC. He called in sick from work and came to ED where CT Head-mild volume loss and involutional change, with no acute abnormality or hemorrhagic focus noted. - Pt had same symptoms while at work without LOC, symptoms lasted 1 minute. This morning , patient woke up with a HA sweating profusely room spinning. Patient then went back to sleep woke up @ 7AM felt same symptoms again when he got up from bed. He denies chest pain, SOB, palpitations, fevers, chills, coughing prior to any syncopal event.    In  the patient was hospitalized at [facility] with headache, nausea and blurred vision. CT, MRI, and MRA were essentially negative and patient was asked to follow up with Dr. Herman as outpatient     PCP: Dr. Guan    PAST MEDICAL & SURGICAL HISTORY:  No pertinent past medical history  History of arthroscopic knee surgery    REVIEW OF SYSTEMS  General:	  Skin/Breast:	  Ophthalmologic:	  ENMT:	  Respiratory and Thorax:  Cardiovascular:	  Gastrointestinal:	  Genitourinary:	  Musculoskeletal:	  Neurological:	  Psychiatric:	    MEDICATIONS  (STANDING):  lactated ringers. 1000 milliLiter(s) (100 mL/Hr) IV Continuous <Continuous>    Allergies  No Known Allergies    SOCIAL HISTORY: current smoker: 1ppd x 35 years  6 beers daily    FAMILY HISTORY: adopted    Vital Signs Last 24 Hrs  T(C): 36.9 (2021 16:29), Max: 37 (2021 14:52)  T(F): 98.4 (2021 16:29), Max: 98.6 (2021 14:52)  HR: 56 (2021 16:29) (56 - 87)  BP: 126/69 (2021 16:29) (122/72 - 148/84)  RR: 18 (2021 16:29) (18 - 20)  SpO2: 96% (2021 16:29) (96% - 100%)    Physical Exam:  Constitutional: AAOx3, NAD  Neck: supple, No JVD  Cardiovascular: +S1S2 RRR, no murmurs, rubs, gallops   Pulmonary: CTA b/l, unlabored, no wheezes, rales. rhonci  Abdomen: +BS, soft NTND  Extremities: no edema b/l, +distal pulses b/l  Neuro: non focal, speech clear, PEARL x 4    LABS:                        14.2   6.52  )-----------( 257      ( 2021 09:41 )             40.8     139  |  104  |  7.8<L>  ----------------------------<  98  4.4   |  25.0  |  0.82  Ca    9.4      2021 09:41  TPro  7.1  /  Alb  4.1  /  TBili  1.1  /  DBili  x   /  AST  41<H>  /  ALT  27  /  AlkPhos  145<H>  07-29  LIVER FUNCTIONS - ( 2021 09:41 )  Alb: 4.1 g/dL / Pro: 7.1 g/dL / ALK PHOS: 145 U/L / ALT: 27 U/L / AST: 41 U/L / GGT: x         PT/INR - ( 2021 09:41 )   PT: 13.1 sec;   INR: 1.14 ratio    PTT - ( 2021 09:41 )  PTT:27.9 secCARDIAC MARKERS ( 2021 12:46 )  x     / <0.01 ng/mL / x     / x     / x      CARDIAC MARKERS ( 2021 09:41 )  x     / <0.01 ng/mL / x     / x     / x        RADIOLOGY & ADDITIONAL STUDIES:  CT angio neck: 21  IMPRESSION:    Brain CT without contrast:  No evidence of intracranial hemorrhage or mass effect. If clinical suspicion for acute infarct persists, brain MRI can be obtained if there is no contraindication.  CT perfusion:  No perfusion abnormality.  CT angiography neck:  1.  No hemodynamically significant stenosis of the bilateral cervical ICAs using NASCET criteria.  2.  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  3.  Two solid right upper lobe nodules measuring up to 5 mm. Consider nonemergent CT chest.  CT angiography brain: Occluded right vertebral artery V4 segment.  CT head and CT perfusion findings were discussed with Dr. MAX HADLEY [phone] at 2021 9:50 AM by Dr. Elroy Richter with read back confirmation.  Finding of right vertebral artery occlusion was discussed with Dr. Charles at 2021 10:15 AM by Dr. Elroy Richter with read back confirmation.    MRI 21  IMPRESSION:  No acute intracranial hemorrhage or acute infarct.    EK21: SR at 77bpm; QRSD 80ms; QT 420ms     60 year male patient, current smoker, with no known medical history who presents once again with syncope. 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 He states he LOC for about 30 seconds, then he woke up with diaphoresis, and a headache. Hedrank water, and felt better afterwards. On , patient felt same symptoms without LOC, while sitting in a chair-symptoms lasted 1 minute in duration. , pt had dizziness while sitting in bed without getting up, no LOC. He called in sick from work and came to ED where CT Head-mild volume loss and involutional change, with no acute abnormality or hemorrhagic focus noted. - Pt had same symptoms while at work without LOC, symptoms lasted 1 minute. This morning , patient woke up with a HA sweating profusely room spinning. Patient then went back to sleep woke up @ 7AM felt same symptoms again when he got up from bed. He denies chest pain, SOB, palpitations, fevers, chills, coughing prior to any syncopal event.    In  the patient was hospitalized at [facility] with headache, nausea and blurred vision. CT, MRI, and MRA were essentially negative and patient was asked to follow up with Dr. Herman as outpatient     PCP: Dr. Guan    PAST MEDICAL & SURGICAL HISTORY:  No pertinent past medical history  History of arthroscopic knee surgery    REVIEW OF SYSTEMS  General: - fever or chills, 	  Skin/Breast: - rashes  Ophthalmologic: + blurred vision R field	  ENMT: - sore throat  Respiratory and Thorax: - dyspnea or cough  Cardiovascular: - chest pain. + palps after episodes  Gastrointestinal:	- N/V/D/C  Genitourinary: - dysuria  Musculoskeletal:	 - arthritis  Neurological: +HA, +sycnope + Right upper extremity weakness with vision changes in right field  Psychiatric: - anxiety     MEDICATIONS  (STANDING):  lactated ringers. 1000 milliLiter(s) (100 mL/Hr) IV Continuous <Continuous>    Allergies  No Known Allergies    SOCIAL HISTORY: current smoker: 1ppd x 35 years  6 beers daily    FAMILY HISTORY: adopted    Vital Signs Last 24 Hrs  T(C): 36.9 (2021 16:29), Max: 37 (2021 14:52)  T(F): 98.4 (2021 16:29), Max: 98.6 (2021 14:52)  HR: 56 (2021 16:29) (56 - 87)  BP: 126/69 (2021 16:29) (122/72 - 148/84)  RR: 18 (2021 16:29) (18 - 20)  SpO2: 96% (2021 16:29) (96% - 100%)    Physical Exam:  Constitutional: AAOx3, NAD, thin appearing  Neck: supple, No JVD  Cardiovascular: +S1S2 RRR, no murmurs, rubs, gallops   Pulmonary: CTA b/l, unlabored, no wheezes, rales. No rhonchi  Abdomen: +BS, soft NTND  Extremities: no edema b/l,   Neuro: non focal, speech clear, PEARL x 4  Psych: no anxiety    LABS:                        14.2   6.52  )-----------( 257      ( 2021 09:41 )             40.8     139  |  104  |  7.8<L>  ----------------------------<  98  4.4   |  25.0  |  0.82  Ca    9.4      2021 09:41  TPro  7.1  /  Alb  4.1  /  TBili  1.1  /  DBili  x   /  AST  41<H>  /  ALT  27  /  AlkPhos  145<H>  07-29  LIVER FUNCTIONS - ( 2021 09:41 )  Alb: 4.1 g/dL / Pro: 7.1 g/dL / ALK PHOS: 145 U/L / ALT: 27 U/L / AST: 41 U/L / GGT: x         PT/INR - ( 2021 09:41 )   PT: 13.1 sec;   INR: 1.14 ratio    PTT - ( 2021 09:41 )  PTT:27.9 secCARDIAC MARKERS ( 2021 12:46 )  x     / <0.01 ng/mL / x     / x     / x      CARDIAC MARKERS ( 2021 09:41 )  x     / <0.01 ng/mL / x     / x     / x        RADIOLOGY & ADDITIONAL STUDIES:  CT angio neck: 21  IMPRESSION:    Brain CT without contrast:  No evidence of intracranial hemorrhage or mass effect. If clinical suspicion for acute infarct persists, brain MRI can be obtained if there is no contraindication.  CT perfusion:  No perfusion abnormality.  CT angiography neck:  1.  No hemodynamically significant stenosis of the bilateral cervical ICAs using NASCET criteria.  2.  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  3.  Two solid right upper lobe nodules measuring up to 5 mm. Consider nonemergent CT chest.  CT angiography brain: Occluded right vertebral artery V4 segment.  CT head and CT perfusion findings were discussed with Dr. MAX HADLEY [phone] at 2021 9:50 AM by Dr. Elroy Richter with read back confirmation.  Finding of right vertebral artery occlusion was discussed with Dr. Charles at 2021 10:15 AM by Dr. Elroy Richter with read back confirmation.    MRI 21  IMPRESSION:  No acute intracranial hemorrhage or acute infarct.    EK21: SR at 77bpm; QRSD 80ms; QT 420ms    A/P  60 year male patient, current smoker, with no known medical history who presents with multiple episodes of near syncope and syncope.    Orthostatics positive. Telemetry reveals episodes of sinus bradycardia with AV block during episode of dizziness          60 year male patient, current smoker, with no known medical history who presents once again with syncope. 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 He states he LOC for about 30 seconds, then he woke up with diaphoresis, and a headache. Hedrank water, and felt better afterwards. On , patient felt same symptoms without LOC, while sitting in a chair-symptoms lasted 1 minute in duration. , pt had dizziness while sitting in bed without getting up, no LOC. He called in sick from work and came to ED where CT Head-mild volume loss and involutional change, with no acute abnormality or hemorrhagic focus noted. - Pt had same symptoms while at work without LOC, symptoms lasted 1 minute. This morning , patient woke up with a HA sweating profusely room spinning. Patient then went back to sleep woke up @ 7AM felt same symptoms again when he got up from bed. He denies chest pain, SOB, palpitations, fevers, chills, coughing prior to any syncopal event.    In  the patient was hospitalized at [facility] with headache, nausea and blurred vision. CT, MRI, and MRA were essentially negative and patient was asked to follow up with Dr. Herman as outpatient     PCP: Dr. Guan    PAST MEDICAL & SURGICAL HISTORY:  No pertinent past medical history  History of arthroscopic knee surgery    REVIEW OF SYSTEMS  General: - fever or chills, 	  Skin/Breast: - rashes  Ophthalmologic: + blurred vision R field	  ENMT: - sore throat  Respiratory and Thorax: - dyspnea or cough  Cardiovascular: - chest pain. + palps after episodes  Gastrointestinal:	- N/V/D/C  Genitourinary: - dysuria  Musculoskeletal:	 - arthritis  Neurological: +HA, +sycnope + Right upper extremity weakness with vision changes in right field  Psychiatric: - anxiety     MEDICATIONS  (STANDING):  lactated ringers. 1000 milliLiter(s) (100 mL/Hr) IV Continuous <Continuous>    Allergies  No Known Allergies    SOCIAL HISTORY: current smoker: 1ppd x 35 years  6 beers daily    FAMILY HISTORY: adopted    Vital Signs Last 24 Hrs  T(C): 36.9 (2021 16:29), Max: 37 (2021 14:52)  T(F): 98.4 (2021 16:29), Max: 98.6 (2021 14:52)  HR: 56 (2021 16:29) (56 - 87)  BP: 126/69 (2021 16:29) (122/72 - 148/84)  RR: 18 (2021 16:29) (18 - 20)  SpO2: 96% (2021 16:29) (96% - 100%)    Physical Exam:  Constitutional: AAOx3, NAD, thin appearing  Neck: supple, No JVD  Cardiovascular: +S1S2 RRR, no murmurs, rubs, gallops   Pulmonary: CTA b/l, unlabored, no wheezes, rales. No rhonchi  Abdomen: +BS, soft NTND  Extremities: no edema b/l,   Neuro: non focal, speech clear, PEARL x 4  Psych: no anxiety    LABS:                        14.2   6.52  )-----------( 257      ( 2021 09:41 )             40.8     139  |  104  |  7.8<L>  ----------------------------<  98  4.4   |  25.0  |  0.82  Ca    9.4      2021 09:41  TPro  7.1  /  Alb  4.1  /  TBili  1.1  /  DBili  x   /  AST  41<H>  /  ALT  27  /  AlkPhos  145<H>  07-29  LIVER FUNCTIONS - ( 2021 09:41 )  Alb: 4.1 g/dL / Pro: 7.1 g/dL / ALK PHOS: 145 U/L / ALT: 27 U/L / AST: 41 U/L / GGT: x         PT/INR - ( 2021 09:41 )   PT: 13.1 sec;   INR: 1.14 ratio    PTT - ( 2021 09:41 )  PTT:27.9 secCARDIAC MARKERS ( 2021 12:46 )  x     / <0.01 ng/mL / x     / x     / x      CARDIAC MARKERS ( 2021 09:41 )  x     / <0.01 ng/mL / x     / x     / x        RADIOLOGY & ADDITIONAL STUDIES:  CT angio neck: 21  IMPRESSION:    Brain CT without contrast:  No evidence of intracranial hemorrhage or mass effect. If clinical suspicion for acute infarct persists, brain MRI can be obtained if there is no contraindication.  CT perfusion:  No perfusion abnormality.  CT angiography neck:  1.  No hemodynamically significant stenosis of the bilateral cervical ICAs using NASCET criteria.  2.  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  3.  Two solid right upper lobe nodules measuring up to 5 mm. Consider nonemergent CT chest.  CT angiography brain: Occluded right vertebral artery V4 segment.  CT head and CT perfusion findings were discussed with Dr. MAX HADLEY [phone] at 2021 9:50 AM by Dr. Elroy Richter with read back confirmation.  Finding of right vertebral artery occlusion was discussed with Dr. Charles at 2021 10:15 AM by Dr. Elroy Richter with read back confirmation.    MRI 21  IMPRESSION:  No acute intracranial hemorrhage or acute infarct.    EK21: SR at 77bpm; QRSD 80ms; QT 420ms    A/P  60 year male patient, current smoker, with no known medical history who presents with multiple episodes of near syncope and syncope.    Orthostatics positive. Telemetry reveals episodes of sinus bradycardia with AV block during episode of dizziness.   Events appear to have no obvious trigger and even occur at rest in seated position as observed in ED    - TTE   - Admit to telemetry  - Exercise stress test  - Stat IV bolus of LR with infusion overnight to correct orthostatics prior to stress test  - May potentially need a dual chamber pacemaker with rate drop response of CLS algorithm to prevent future events         60 year male patient, current smoker, with no known medical history who presents once again with syncope. 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 He states he LOC for about 30 seconds, then he woke up with diaphoresis, and a headache. Hedrank water, and felt better afterwards. On , patient felt same symptoms without LOC, while sitting in a chair-symptoms lasted 1 minute in duration. , pt had dizziness while sitting in bed without getting up, no LOC. He called in sick from work and came to ED where CT Head-mild volume loss and involutional change, with no acute abnormality or hemorrhagic focus noted. - Pt had same symptoms while at work without LOC, symptoms lasted 1 minute. This morning , patient woke up with a HA sweating profusely room spinning. Patient then went back to sleep woke up @ 7AM felt same symptoms again when he got up from bed. He denies chest pain, SOB, palpitations, fevers, chills, coughing prior to any syncopal event.    In  the patient was hospitalized at [facility] with headache, nausea and blurred vision. CT, MRI, and MRA were essentially negative and patient was asked to follow up with Dr. Herman as outpatient     PCP: Dr. Guan    PAST MEDICAL & SURGICAL HISTORY:  No pertinent past medical history  History of arthroscopic knee surgery    REVIEW OF SYSTEMS  General: - fever or chills, 	  Skin/Breast: - rashes  Ophthalmologic: + blurred vision R field	  ENMT: - sore throat  Respiratory and Thorax: - dyspnea or cough  Cardiovascular: - chest pain. + palps after episodes  Gastrointestinal:	- N/V/D/C  Genitourinary: - dysuria  Musculoskeletal:	 - arthritis  Neurological: +HA, +sycnope + Right upper extremity weakness with vision changes in right field  Psychiatric: - anxiety     MEDICATIONS  (STANDING):  lactated ringers. 1000 milliLiter(s) (100 mL/Hr) IV Continuous <Continuous>    Allergies  No Known Allergies    SOCIAL HISTORY: current smoker: 1ppd x 35 years  6 beers daily    FAMILY HISTORY: adopted    Vital Signs Last 24 Hrs  T(C): 36.9 (2021 16:29), Max: 37 (2021 14:52)  T(F): 98.4 (2021 16:29), Max: 98.6 (2021 14:52)  HR: 56 (2021 16:29) (56 - 87)  BP: 126/69 (2021 16:29) (122/72 - 148/84)  RR: 18 (2021 16:29) (18 - 20)  SpO2: 96% (2021 16:29) (96% - 100%)    Physical Exam:  Constitutional: AAOx3, NAD, thin appearing  Neck: supple, No JVD  Cardiovascular: +S1S2 RRR, no murmurs, rubs, gallops   Pulmonary: CTA b/l, unlabored, no wheezes, rales. No rhonchi  Abdomen: +BS, soft NTND  Extremities: no edema b/l,   Neuro: non focal, speech clear, PEARL x 4  Psych: no anxiety    LABS:                        14.2   6.52  )-----------( 257      ( 2021 09:41 )             40.8     139  |  104  |  7.8<L>  ----------------------------<  98  4.4   |  25.0  |  0.82  Ca    9.4      2021 09:41  TPro  7.1  /  Alb  4.1  /  TBili  1.1  /  DBili  x   /  AST  41<H>  /  ALT  27  /  AlkPhos  145<H>  07-29  LIVER FUNCTIONS - ( 2021 09:41 )  Alb: 4.1 g/dL / Pro: 7.1 g/dL / ALK PHOS: 145 U/L / ALT: 27 U/L / AST: 41 U/L / GGT: x         PT/INR - ( 2021 09:41 )   PT: 13.1 sec;   INR: 1.14 ratio    PTT - ( 2021 09:41 )  PTT:27.9 secCARDIAC MARKERS ( 2021 12:46 )  x     / <0.01 ng/mL / x     / x     / x      CARDIAC MARKERS ( 2021 09:41 )  x     / <0.01 ng/mL / x     / x     / x        RADIOLOGY & ADDITIONAL STUDIES:  CT angio neck: 21  IMPRESSION:    Brain CT without contrast:  No evidence of intracranial hemorrhage or mass effect. If clinical suspicion for acute infarct persists, brain MRI can be obtained if there is no contraindication.  CT perfusion:  No perfusion abnormality.  CT angiography neck:  1.  No hemodynamically significant stenosis of the bilateral cervical ICAs using NASCET criteria.  2.  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  3.  Two solid right upper lobe nodules measuring up to 5 mm. Consider nonemergent CT chest.  CT angiography brain: Occluded right vertebral artery V4 segment.  CT head and CT perfusion findings were discussed with Dr. MAX HADLEY [phone] at 2021 9:50 AM by Dr. Elroy Richter with read back confirmation.  Finding of right vertebral artery occlusion was discussed with Dr. Charles at 2021 10:15 AM by Dr. Elroy Richter with read back confirmation.    MRI 21  IMPRESSION:  No acute intracranial hemorrhage or acute infarct.    EK21: SR at 77bpm; QRSD 80ms; QT 420ms    A/P  60 year male patient, current smoker, with no known medical history who presents with multiple episodes of near syncope and syncope.    Orthostatics positive. Telemetry reveals episodes of sinus bradycardia with AV block during episode of dizziness.   Events appear to have no obvious trigger and even occur at rest in seated position as observed in ED    - TTE   - Admit to telemetry  - Exercise stress test  - Stat IV bolus of LR with infusion overnight to correct orthostatics prior to stress test  - May potentially need a dual chamber pacemaker with rate drop response or CLS algorithm to prevent future events

## 2021-07-29 NOTE — H&P ADULT - ACCEPTING BEDSIDE PROVIDER ATTESTATION:
I have reviewed and verified the documentation from the telehealth provider and made amendments were necessary.I have discussed the case with the telehealth physician

## 2021-07-29 NOTE — ED CDU PROVIDER INITIAL DAY NOTE - PROGRESS NOTE DETAILS
COLT at bedside   MRI pending MRI negative for acute stroke. + microvascular changes   patient upon return from MRI and TTE with episode of diaphoresis tingling to hand dizziness, bradycardic at bedside into the 30s  [facility] called to bedside recommending EEG to r/o seizure activity and EP consultation. episode lasting 30 seconds to about 1 min.   personally reached out via teams to Dr carrizales regarding EEG request. pending EP recommendations

## 2021-07-29 NOTE — H&P ADULT - NSHPLABSRESULTS_GEN_ALL_CORE
14.2   6.52  )-----------( 257      ( 29 Jul 2021 09:41 )             40.8       07-29    139  |  104  |  7.8<L>  ----------------------------<  98  4.4   |  25.0  |  0.82    Ca    9.4      29 Jul 2021 09:41    TPro  7.1  /  Alb  4.1  /  TBili  1.1  /  DBili  x   /  AST  41<H>  /  ALT  27  /  AlkPhos  145<H>  07-29      CARDIAC MARKERS ( 29 Jul 2021 15:55 )  x     / <0.01 ng/mL / x     / x     / x      CARDIAC MARKERS ( 29 Jul 2021 12:46 )  x     / <0.01 ng/mL / x     / x     / x      CARDIAC MARKERS ( 29 Jul 2021 09:41 )  x     / <0.01 ng/mL / x     / x     / x          < from: MR Head No Cont (07.29.21 @ 13:47) >       EXAM:  MR BRAIN                          PROCEDURE DATE:  07/29/2021          INTERPRETATION:  CLINICAL STATEMENT: Pain.    TECHNIQUE: MRI of the brain was performed without gadolinium.    COMPARISON: CT head 7/29/2021.    FINDINGS:  Few punctate T2 prolongation signal normalities in the subcortical white matter which may be related to minimal chronic microvascular ischemic changes.    There is no acute parenchymal hemorrhage, parenchymal mass, mass effect or midline shift. There is no extra-axial fluid collection.  There is no hydrocephalus.  There is no acute infarct.    Mucosal thickening paranasal sinuses. Small retention cyst/polyp right maxillary sinus    IMPRESSION:  No acute intracranial hemorrhage or acute infarct.    --- End of Report ---            KRISTEN SOLORZANO MD; Attending Radiologist  This document has been electronically signed. Jul 29 2021  1:46PM    < end of copied text >

## 2021-07-29 NOTE — ED CDU PROVIDER INITIAL DAY NOTE - MEDICAL DECISION MAKING DETAILS
59 yo male with multiple days of presyncopal/syncopal events dizziness. found to have vertebral artery occulsion on CT ordered for MRI, pending cardiology consult. 59 yo male with multiple days of presyncopal/syncopal events dizziness. found to have vertebral artery occlusion on CT ordered for MRI, pending cardiology consult.

## 2021-07-29 NOTE — ED CDU PROVIDER INITIAL DAY NOTE - OBJECTIVE STATEMENT
59 yo male  no reported health hx presenting ot the ER with multiple days of dizziness presncopal/ syncopal episodes. reports onset of symptoms on saturday   - saturday while working felt lightheaded earlier afternoonm, felt dizzy described initially as numb and that he feels like he is spinning. states that he passed out and was in the bottom of the tub. no associated chest pain sob, no headache no nausea or vomiting no abdominal pain. states that felt off the rest of the day.   - sunday states that he was watching tv and felt the same sensation coming over ie sweaty, severe headache with pressure to the eyes and describes feeling like he was looking through a film. headache located to back right side of the head, associated dizziness. last 30 seconds to 1 min. reports increasing hydration but admits to decreased appetite   - monday: went to work felt okay   - tuesday morning: sweaty dizziness, felt that he was going to pass out. waited about 1 hour but symptoms didn't resolved and decided to go to the ER.  was treated in the ER released from ER, continued self hydration at home   - wednesday: felt fine in AM, states that by later morning wasn't feeling well. dizziness sweaty tingling to the left arm, sat on stoop and then woke up on the floor.   - thursday 245 am, cold sweat and felt dizziness again. states that he felt that his brain was short circuiting. states that he felt like he was loosing his ability to think clearly. states that episode lasted about 1-1.5 mins. states that he went back to sleep at that time woke up at 7am for work. states that another episode started with sweating, numbness to the right arm and tingling to the fingertips on both sides.    sates that he currently has a headache to the back of the head. states that he did take tylenol last evening.   smoker daily about 1 pack   ETOH most days: beer 6 packs more on weekends.   unknown family hx adopted   no recent sick contacts travel. no chest pain sob no abdominal pain nausea vomiting changes in bowel movements

## 2021-07-29 NOTE — ED CDU PROVIDER INITIAL DAY NOTE - ENMT NEGATIVE STATEMENT, MLM
no nasal congestion and no nasal drainage.no dysphagia, no hoarseness and no throat pain.  no pain, no stiffness

## 2021-07-29 NOTE — CONSULT NOTE ADULT - SUBJECTIVE AND OBJECTIVE BOX
[facility]                                     Neurology at [place]                                 Bernabe Sanchez, & Alcides                                  [address]                                        [place], [place], [address]                                             [phone]    CC: recurrent syncope/near syncope with right arm numbness  HPI: The patient is a 60y Male who presented with several episodes of syncope/near syncope since Saturday.  He says that he feels dizzy and has rapid heart rate and sweats then he has tinglijng in right arm.  He then either passes out or almost does.  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    PAST MEDICAL & SURGICAL HISTORY:  No pertinent past medical history    History of arthroscopic knee surgery        MEDICATIONS  (STANDING):    MEDICATIONS  (PRN):      Allergies    No Known Allergies    Intolerances        SOCIAL HISTORY:  (+)tob,   (+) alcohol 6 beers daily  no drugs    FAMILY HISTORY:  no stroke in either parent      ROS: 14 point ROS negative other than what is present in HPI or below    Vital Signs Last 24 Hrs  T(C): 36.8 (29 Jul 2021 10:34), Max: 36.8 (29 Jul 2021 10:34)  T(F): 98.2 (29 Jul 2021 10:34), Max: 98.2 (29 Jul 2021 10:34)  HR: 87 (29 Jul 2021 10:34) (61 - 87)  BP: 145/75 (29 Jul 2021 10:34) (122/72 - 145/75)  BP(mean): --  RR: 20 (29 Jul 2021 10:34) (18 - 20)  SpO2: 100% (29 Jul 2021 10:34) (99% - 100%)      General: NAD    Detailed Neurologic Exam:    Mental status: The patient is awake and alert and has normal attention span.  The patient is fully oriented in 3 spheres. The patient is oriented to current events. The patient is able to name objects, follow commands, repeat sentences.    Cranial nerves: Pupils equal and react symmetrically to light. There is no visual field deficit to confrontation. Extraocular motion is full with no nystagmus. There is no ptosis. Facial sensation is intact. Facial musculature is symmetric. Palate elevates symmetrically.  Tongue is midline.    Motor: There is normal bulk and tone.  There is no tremor.  Strength is 5/5 in the right arm and leg.   Strength is 5/5 in the left arm and leg.    Sensation: Intact to light touch and pin in 4 extremities, subjective paresthesia of D2/3 on right hand    Reflexes: 2+ throughout and plantar responses are flexor.    Cerebellar: There is no dysmetria on finger to nose testing.    Gait : deferred    NIH SS:  DATE: 7/29/21  TIME: 1130  1A: Level of consciousness (0-3): 0  1B: Questions (0-2): 0  1C: Commands (0-2): 0  2: Gaze (0-2): 0  3: Visual fields (0-3): 0  4: Facial palsy (0-3): 0  MOTOR:  5A: Left arm motor drift (0-4): 0  5B: Right arm motor drift (0-4): 0  6A: Left leg motor drift (0-4): 0  6B: Right leg motor drift (0-4): 0  7: Limb ataxia (0-2): 0  SENSORY:  8: Sensation (0-2): 0  SPEECH:  9: Language (0-3): 0  10: Dysarthria (0-2): 0  EXTINCTION:  11: Extinction/inattention (0-2): 0    TOTAL SCORE: 0        LABS:                         14.2   6.52  )-----------( 257      ( 29 Jul 2021 09:41 )             40.8       07-29    139  |  104  |  7.8<L>  ----------------------------<  98  4.4   |  25.0  |  0.82    Ca    9.4      29 Jul 2021 09:41    TPro  7.1  /  Alb  4.1  /  TBili  1.1  /  DBili  x   /  AST  41<H>  /  ALT  27  /  AlkPhos  145<H>  07-29      PT/INR - ( 29 Jul 2021 09:41 )   PT: 13.1 sec;   INR: 1.14 ratio         PTT - ( 29 Jul 2021 09:41 )  PTT:27.9 sec    RADIOLOGY & ADDITIONAL STUDIES (independently reviewed unless otherwise noted):  CT head: no acute CVA, mass or blood  CTA head: no aneurysm, AVM, right vert (V4) occlusion  CTA neck: no sig carotid stenosis, (+) right vertenral artery stenosis  CT Perfusion head - CBF<30% volume 0ml, Tmax>6s volume =0ml

## 2021-07-29 NOTE — H&P ADULT - ASSESSMENT
61 y/o male with no known medical history who presents to [facility]-ED for syncope. 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 Pt states he LOC for about 30 seconds, then he woke up with diaphoresis, and a headache. On Sunday 7/25, patient felt same symptoms without LOC, while sitting in a chair-symptoms lasted 1 minute in duration. Tuesday 7/27, pt had dizziness while sitting in bed without getting up, no LOC. Pt called in sick from work and came to ED where CT Head-mild volume loss and involutional change, with no acute abnormality or hemorrhagic focus noted. Pt was DC'd with diagnosis of dehydration and was advised to increase PO water intake. Wednesday 7/28- Pt had same symptoms while at work without LOC, symptoms lasted 1 minute. This morning 7/29, patient woke up with a HA sweating profusely room spinning. Pt went back to sleep woke up @ 7AM felt same symptoms again when he got up from bed. Pt denies chest pain, SOB, palpitations, fevers, chills, coughing prior to any syncopal event. In ED, Tropx2-negative, CTA-Occluded right vertebral artery V4 segment, CTA Neck-No hemodynamically significant stenosis of the bilateral cervical ICAs using NASCET criteria. 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 Patient had 2 pre-syncopal events while in hospital, no LOC. During one event, patient was noted to have bradycardia in 30s, with questionable pause. Pt admitted for syncope and bradycardia.    1) Syncope with Bradycardia  - admit to tele  -  cardio and EP consulted and following  61 y/o male with no known medical history who presents to [facility]ED for syncope. 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 Pt states he LOC for about 30 seconds, then he woke up with diaphoresis, and a headache. On Sunday 7/25, patient felt same symptoms without LOC, while sitting in a chair-symptoms lasted 1 minute in duration. Tuesday 7/27, pt had dizziness while sitting in bed without getting up, no LOC. Pt called in sick from work and came to ED where CT Head-mild volume loss and involutional change, with no acute abnormality or hemorrhagic focus noted. Pt was DC'd with diagnosis of dehydration and was advised to increase PO water intake. Wednesday 7/28- Pt had same symptoms while at work without LOC, symptoms lasted 1 minute. This morning 7/29, patient woke up with a HA sweating profusely room spinning. Pt went back to sleep woke up @ 7AM felt same symptoms again when he got up from bed. Pt denies chest pain, SOB, palpitations, fevers, chills, coughing prior to any syncopal event. In ED, Tropx2-negative, CTA-Occluded right vertebral artery V4 segment, CTA Neck-No hemodynamically significant stenosis of the bilateral cervical ICAs using NASCET criteria. 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 Patient had 2 pre-syncopal events while in hospital, no LOC. During one event, patient was noted to have bradycardia in 30s, with questionable pause. Pt admitted for syncope and bradycardia.    1) Syncope with Bradycardia  - admit to tele  -  cardio and EP consulted and following  - CTA cardiac ordered per cardio  - Per EP:   Exercise stress test  Stat IV bolus of LR with infusion overnight to correct orthostatics prior to stress test  May potentially need a dual chamber pacemaker with rate drop response of CLS algorithm to prevent future events      2) Prophylactic measure  - DVT ppx: lovenox SQ    Plan discussed with pt and admitting hospitalist Dr. Milton. 59 y/o male with no known medical history who presents to [facility]ED for syncope. 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 Pt states he LOC for about 30 seconds, then he woke up with diaphoresis, and a headache. On Sunday 7/25, patient felt same symptoms without LOC, while sitting in a chair-symptoms lasted 1 minute in duration. Tuesday 7/27, pt had dizziness while sitting in bed without getting up, no LOC. Pt called in sick from work and came to ED where CT Head-mild volume loss and involutional change, with no acute abnormality or hemorrhagic focus noted. Pt was DC'd with diagnosis of dehydration and was advised to increase PO water intake. Wednesday 7/28- Pt had same symptoms while at work without LOC, symptoms lasted 1 minute. This morning 7/29, patient woke up with a HA sweating profusely room spinning. Pt went back to sleep woke up @ 7AM felt same symptoms again when he got up from bed. Pt denies chest pain, SOB, palpitations, fevers, chills, coughing prior to any syncopal event. In ED, Tropx2-negative, CTA-Occluded right vertebral artery V4 segment, CTA Neck-No hemodynamically significant stenosis of the bilateral cervical ICAs using NASCET criteria. 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 Patient had 2 pre-syncopal events while in hospital, no LOC. During one event, patient was noted to have bradycardia in 30s, with questionable pause. Pt admitted for syncope and bradycardia.    1) Syncope with Bradycardia  - admit to tele  -  cardio and EP consulted and following  - atropine iv 0.5 mg prn for symptomatic bradycardia  - if persistent bradycardia unrelenting to atropine then consider ICU upgrade and plan for dopamine drip or TVP  - ECHo  - troponin  - CTA cardiac ordered per cardio  - Per EP:   Exercise stress test  Stat IV bolus of LR with infusion overnight to correct orthostatics prior to stress test  May potentially need a dual chamber pacemaker with rate drop response of CLS algorithm to prevent future events    2) Prophylactic measure  - DVT ppx: lovenox SQ

## 2021-07-29 NOTE — ED PROVIDER NOTE - ADDITIONAL RISK FACTOR FREE TEXT BOX
I have personally provided ___38 minutes of critical care time exclusive of time spent on separately billable procedures. Time includes review of laboratory data, radiology results, discussion with consultants, and monitoring for potential decompensation. Interventions were performed as documented above

## 2021-07-29 NOTE — ED ADULT NURSE REASSESSMENT NOTE - NS ED NURSE REASSESS COMMENT FT1
patient back from echo with RN. patient with episode of diaphoresis, tingling. patient  anamika to 39 on tele. MILAN Olmedo at bedside. 12 lead EKG obtained. cardiology called. left on 12 lead. VSS.  will ctm
LR bolus hung per PA order. in NAD. patient aware he is to be admitted. will ctm
neuro check done. IVF running. in NAD. awaiting cards recs.  will ctm
report received from DANISH Lazcano. AAO X 4. c/o headache. troponin and lipid drawn per order. to MRI. c/o headache 3/10. NIH 0. neuro intact. tele NSR. states intermittent dizziness and headache " a few days". states he is an everyday drinker. last drink saturday. all questions answered. MRI screening form completed. will ctm

## 2021-07-29 NOTE — ED PROVIDER NOTE - OBJECTIVE STATEMENT
59 yo M presents to ED c/o sudden onset of dizziness which he describes as vertigo with b/l hand/finger tingling while getting OOB at 0659 this AM.  Pt was seen in ED 2 days ago for similar presentation and had neg w/u including labs and non-contrast CT head and was subsequently discharged.  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  Pt denies any assoc CP, SOB, N/V, focal weakness or abd pain, Pt does admit to occ blurred vision in R eye, but not at present. Pt admits to smoking 1 ppd x last 35 years abd usually drinks a 6 pack of beer almost daily.  No illicit substance use.  Pt only c/o subjective numbness of fingertips at present.  Pt seen by physician in intake and code stroke called

## 2021-07-29 NOTE — CONSULT NOTE ADULT - SUBJECTIVE AND OBJECTIVE BOX
[place] CARDIOLOGY-[facility]                                                               Office:  [address] [address]                                                              Telephone: [phone]. Fax:[phone]                                                                        CARDIOLOGY CONSULTATION NOTE                                                                                             Consult requested by:  MILAN Steinberg  Reason for Consultation: Syncope  PMD: None  Primary Cardiology: None  History obtained by: Patient and medical record   obtained: No    Chief complaint:    Patient is a 60y old  Male who presents with a chief complaint of syncope      HPI: Pt is a 59 y/o male with no known medical history who presents to [facility]ED for syncope. 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 Pt states he LOC for about 30 seconds, then he woke up with diaphoresis, and a headache. Pt drank water, and felt better afterwards. On Sunday 7/25, patient felt same symptoms without LOC, while sitting in a chair-symptoms lasted 1 minute in duration. Tuesday 7/27, pt had dizziness while sitting in bed without getting up, no LOC. Pt called in sick from work and came to ED where CT Head-mild volume loss and involutional change, with no acute abnormality or hemorrhagic focus noted. Pt was DC'd with diagnosis of dehydration and was advised to increase PO water intake. Wednesday 7/28- Pt had same symptoms while at work without LOC, symptoms lasted 1 minute. This morning 7/29, patient woke up with a HA sweating profusely room spinning. Pt went back to sleep woke up @ 7AM felt same symptoms again when he got up from bed. Pt denies chest pain, SOB, palpitations, fevers, chills, coughing prior to any syncopal event. In ED, Tropx2-negative, CTA-Occluded right vertebral artery V4 segment, CTA Neck-No hemodynamically significant stenosis of the bilateral cervical ICAs using NASCET criteria. 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 Patient had 2 pre-syncopal events while in hospital, no LOC. During one event, patient was noted to have bradycardia in 30s, with questionable pause.            REVIEW OF SYMPTOMS:     CONSTITUTIONAL: No fever, weight loss, or fatigue  ENMT:  No difficulty hearing, tinnitus, vertigo; No sinus or throat pain  NECK: No pain or stiffness  CARDIOVASCULAR: See HPI  RESPIRATORY: No Dyspnea on exertion, Shortness of breath, cough, wheezing  : No dysuria, no hematuria   GI: No dark color stool, no melena, no diarrhea, no constipation, no abdominal pain   NEURO: No headache, no dizziness, no slurred speech   MUSCULOSKELETAL: No joint pain or swelling; No muscle, back, or extremity pain  PSYCH: No agitation, no anxiety.    ALL OTHER REVIEW OF SYSTEMS ARE NEGATIVE.      PREVIOUS DIAGNOSTIC TESTING    ECHO FINDINGS: pending      ALLERGIES: Allergies    No Known Allergies    Intolerances      PAST MEDICAL HISTORY  No pertinent past medical history        PAST SURGICAL HISTORY  History of arthroscopic knee surgery        FAMILY HISTORY:  Unknown-pt is adopted    SOCIAL HISTORY:    CIGARETTES:  active 1 ppd for 35 years   ALCOHOL: 6 pack beer daily   DRUGS: Denies      CURRENT MEDICATIONS:  lactated ringers        HOME MEDICATIONS:  None      Vital Signs Last 24 Hrs  T(C): 37 (29 Jul 2021 14:52), Max: 37 (29 Jul 2021 14:52)  T(F): 98.6 (29 Jul 2021 14:52), Max: 98.6 (29 Jul 2021 14:52)  HR: 58 (29 Jul 2021 14:52) (58 - 87)  BP: 148/84 (29 Jul 2021 14:52) (122/72 - 148/84)  RR: 18 (29 Jul 2021 14:52) (18 - 20)  SpO2: 99% (29 Jul 2021 14:52) (99% - 100%)      PHYSICAL EXAM:  Constitutional: Comfortable . No acute distress.   HEENT: Atraumatic and normocephalic , neck is supple . no JVD. No carotid bruit. PEERL   CNS: A&Ox3. No focal deficits. EOMI.   Lymph Nodes: Cervical : Not palpable.  Respiratory: CTAB  Cardiovascular: S1S2 RRR. No murmur/rubs or gallop.  Gastrointestinal: Soft non-tender and non distended . +Bowel sounds. negative Bueno's sign.  Extremities: No edema.   Psychiatric: Calm . no agitation.  Skin: No skin rash/ulcers visualized to face, hands or feet.    Intake and output:     LABS:                        14.2   6.52  )-----------( 257      ( 29 Jul 2021 09:41 )             40.8     07-29    139  |  104  |  7.8<L>  ----------------------------<  98  4.4   |  25.0  |  0.82    Ca    9.4      29 Jul 2021 09:41    TPro  7.1  /  Alb  4.1  /  TBili  1.1  /  DBili  x   /  AST  41<H>  /  ALT  27  /  AlkPhos  145<H>  07-29    CARDIAC MARKERS ( 29 Jul 2021 12:46 )  x     / <0.01 ng/mL / x     / x     / x      CARDIAC MARKERS ( 29 Jul 2021 09:41 )  x     / <0.01 ng/mL / x     / x     / x        ;p-BNP=  PT/INR - ( 29 Jul 2021 09:41 )   PT: 13.1 sec;   INR: 1.14 ratio         PTT - ( 29 Jul 2021 09:41 )  PTT:27.9 sec      INTERPRETATION OF TELEMETRY: SB HR @ 40-50s  ECG: NSR HR @ 64    RADIOLOGY & ADDITIONAL STUDIES:      X-ray:  < from: Xray Chest 2 Views PA/Lat (07.27.21 @ 10:01) >  IMPRESSION:  Negative radiographs        CT scan: < from: CT Brain Stroke Protocol (07.29.21 @ 09:53) >  IMPRESSION:    Brain CT without contrast:  No evidence of intracranial hemorrhage or mass effect. If clinical suspicion for acute infarct persists, brain MRI can be obtained if there is no contraindication.    CT perfusion:  No perfusion abnormality.    CT angiography neck:  1.  No hemodynamically significant stenosis of the bilateral cervical ICAs using NASCET criteria.  2.  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  3.  Two solid right upper lobe nodules measuring up to 5 mm. Consider nonemergent CT chest.    CT angiography brain: Occluded right vertebral artery V4 segment.      < from: CT Angio Head w/ IV Cont (07.29.21 @ 09:56) >  IMPRESSION:    Brain CT without contrast:  No evidence of intracranial hemorrhage or mass effect. If clinical suspicion for acute infarct persists, brain MRI can be obtained if there is no contraindication.    CT perfusion:  No perfusion abnormality.    CT angiography neck:  1.  No hemodynamically significant stenosis of the bilateral cervical ICAs using NASCET criteria.  2.  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  3.  Two solid right upper lobe nodules measuring up to 5 mm. Consider nonemergent CT chest.    CT angiography brain: Occluded right vertebral artery V4 segment.      MRI: < from: MR Head No Cont (07.29.21 @ 13:47) >    IMPRESSION:  No acute intracranial hemorrhage or acute infarct.

## 2021-07-29 NOTE — ED CDU PROVIDER INITIAL DAY NOTE - ATTENDING CONTRIBUTION TO CARE
I agree with the PA's note and was available for any issues/concerns. I was directly involved in patient care. My brief overall assessment is as follows:    60 year old male no PMHx c/o dizziness; initial work up and ED assessment noted; will admit to obs for further work up

## 2021-07-29 NOTE — H&P ADULT - HISTORY OF PRESENT ILLNESS
59 y/o male with no known medical history who presents to [facility]-ED for syncope. 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 Pt states he LOC for about 30 seconds, then he woke up with diaphoresis, and a headache. On Sunday 7/25, patient felt same symptoms without LOC, while sitting in a chair-symptoms lasted 1 minute in duration. Tuesday 7/27, pt had dizziness while sitting in bed without getting up, no LOC. Pt called in sick from work and came to ED where CT Head-mild volume loss and involutional change, with no acute abnormality or hemorrhagic focus noted. Pt was DC'd with diagnosis of dehydration and was advised to increase PO water intake. Wednesday 7/28- Pt had same symptoms while at work without LOC, symptoms lasted 1 minute. This morning 7/29, patient woke up with a HA sweating profusely room spinning. Pt went back to sleep woke up @ 7AM felt same symptoms again when he got up from bed. Pt denies chest pain, SOB, palpitations, fevers, chills, coughing prior to any syncopal event. In ED, Tropx2-negative, CTA-Occluded right vertebral artery V4 segment, CTA Neck-No hemodynamically significant stenosis of the bilateral cervical ICAs using NASCET criteria. 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 Patient had 2 pre-syncopal events while in hospital, no LOC. During one event, patient was noted to have bradycardia in 30s, with questionable pause. Pt admitted for syncope and bradycardia.

## 2021-07-30 DIAGNOSIS — I25.10 ATHEROSCLEROTIC HEART DISEASE OF NATIVE CORONARY ARTERY WITHOUT ANGINA PECTORIS: ICD-10-CM

## 2021-07-30 LAB
ANION GAP SERPL CALC-SCNC: 9 MMOL/L — SIGNIFICANT CHANGE UP (ref 5–17)
BUN SERPL-MCNC: 12.6 MG/DL — SIGNIFICANT CHANGE UP (ref 8–20)
CALCIUM SERPL-MCNC: 8.9 MG/DL — SIGNIFICANT CHANGE UP (ref 8.6–10.2)
CHLORIDE SERPL-SCNC: 102 MMOL/L — SIGNIFICANT CHANGE UP (ref 98–107)
CO2 SERPL-SCNC: 25 MMOL/L — SIGNIFICANT CHANGE UP (ref 22–29)
COVID-19 SPIKE DOMAIN AB INTERP: POSITIVE
COVID-19 SPIKE DOMAIN ANTIBODY RESULT: >250 U/ML — HIGH
CREAT SERPL-MCNC: 0.97 MG/DL — SIGNIFICANT CHANGE UP (ref 0.5–1.3)
GLUCOSE SERPL-MCNC: 96 MG/DL — SIGNIFICANT CHANGE UP (ref 70–99)
HCT VFR BLD CALC: 39 % — SIGNIFICANT CHANGE UP (ref 39–50)
HCV AB S/CO SERPL IA: 0.12 S/CO — SIGNIFICANT CHANGE UP (ref 0–0.99)
HCV AB SERPL-IMP: SIGNIFICANT CHANGE UP
HGB BLD-MCNC: 13.4 G/DL — SIGNIFICANT CHANGE UP (ref 13–17)
MAGNESIUM SERPL-MCNC: 1.6 MG/DL — SIGNIFICANT CHANGE UP (ref 1.6–2.6)
MCHC RBC-ENTMCNC: 34.4 GM/DL — SIGNIFICANT CHANGE UP (ref 32–36)
MCHC RBC-ENTMCNC: 38.1 PG — HIGH (ref 27–34)
MCV RBC AUTO: 110.8 FL — HIGH (ref 80–100)
PLATELET # BLD AUTO: 228 K/UL — SIGNIFICANT CHANGE UP (ref 150–400)
POTASSIUM SERPL-MCNC: 5.1 MMOL/L — SIGNIFICANT CHANGE UP (ref 3.5–5.3)
POTASSIUM SERPL-SCNC: 5.1 MMOL/L — SIGNIFICANT CHANGE UP (ref 3.5–5.3)
RBC # BLD: 3.52 M/UL — LOW (ref 4.2–5.8)
RBC # FLD: 14.6 % — HIGH (ref 10.3–14.5)
SARS-COV-2 IGG+IGM SERPL QL IA: >250 U/ML — HIGH
SARS-COV-2 IGG+IGM SERPL QL IA: POSITIVE
SARS-COV-2 RNA SPEC QL NAA+PROBE: SIGNIFICANT CHANGE UP
SODIUM SERPL-SCNC: 136 MMOL/L — SIGNIFICANT CHANGE UP (ref 135–145)
TROPONIN T SERPL-MCNC: <0.01 NG/ML — SIGNIFICANT CHANGE UP (ref 0–0.06)
WBC # BLD: 5.18 K/UL — SIGNIFICANT CHANGE UP (ref 3.8–10.5)
WBC # FLD AUTO: 5.18 K/UL — SIGNIFICANT CHANGE UP (ref 3.8–10.5)

## 2021-07-30 PROCEDURE — 92928 PRQ TCAT PLMT NTRAC ST 1 LES: CPT | Mod: RC

## 2021-07-30 PROCEDURE — 93010 ELECTROCARDIOGRAM REPORT: CPT | Mod: 76

## 2021-07-30 PROCEDURE — 99233 SBSQ HOSP IP/OBS HIGH 50: CPT

## 2021-07-30 PROCEDURE — 75574 CT ANGIO HRT W/3D IMAGE: CPT | Mod: 26

## 2021-07-30 PROCEDURE — 92978 ENDOLUMINL IVUS OCT C 1ST: CPT | Mod: 26,RC

## 2021-07-30 PROCEDURE — 99152 MOD SED SAME PHYS/QHP 5/>YRS: CPT | Mod: 59

## 2021-07-30 PROCEDURE — 93572 IV DOP VEL&/PRESS C FLO EA: CPT | Mod: 26,LC

## 2021-07-30 PROCEDURE — 93571 IV DOP VEL&/PRESS C FLO 1ST: CPT | Mod: 26,LD

## 2021-07-30 PROCEDURE — 99252 IP/OBS CONSLTJ NEW/EST SF 35: CPT

## 2021-07-30 PROCEDURE — 99232 SBSQ HOSP IP/OBS MODERATE 35: CPT

## 2021-07-30 PROCEDURE — 93454 CORONARY ARTERY ANGIO S&I: CPT | Mod: 26,59

## 2021-07-30 RX ORDER — ATORVASTATIN CALCIUM 80 MG/1
80 TABLET, FILM COATED ORAL AT BEDTIME
Refills: 0 | Status: DISCONTINUED | OUTPATIENT
Start: 2021-07-30 | End: 2021-08-03

## 2021-07-30 RX ORDER — CLOPIDOGREL BISULFATE 75 MG/1
75 TABLET, FILM COATED ORAL ONCE
Refills: 0 | Status: DISCONTINUED | OUTPATIENT
Start: 2021-07-30 | End: 2021-08-01

## 2021-07-30 RX ORDER — ATROPINE SULFATE 0.1 MG/ML
0.5 SYRINGE (ML) INJECTION ONCE
Refills: 0 | Status: DISCONTINUED | OUTPATIENT
Start: 2021-07-30 | End: 2021-08-01

## 2021-07-30 RX ORDER — CLOPIDOGREL BISULFATE 75 MG/1
75 TABLET, FILM COATED ORAL DAILY
Refills: 0 | Status: DISCONTINUED | OUTPATIENT
Start: 2021-07-31 | End: 2021-08-03

## 2021-07-30 RX ORDER — ASPIRIN/CALCIUM CARB/MAGNESIUM 324 MG
81 TABLET ORAL ONCE
Refills: 0 | Status: COMPLETED | OUTPATIENT
Start: 2021-07-30 | End: 2021-08-01

## 2021-07-30 RX ORDER — MAGNESIUM SULFATE 500 MG/ML
1 VIAL (ML) INJECTION ONCE
Refills: 0 | Status: COMPLETED | OUTPATIENT
Start: 2021-07-30 | End: 2021-07-30

## 2021-07-30 RX ADMIN — Medication 100 GRAM(S): at 08:19

## 2021-07-30 RX ADMIN — ATORVASTATIN CALCIUM 80 MILLIGRAM(S): 80 TABLET, FILM COATED ORAL at 21:51

## 2021-07-30 RX ADMIN — SODIUM CHLORIDE 100 MILLILITER(S): 9 INJECTION, SOLUTION INTRAVENOUS at 06:13

## 2021-07-30 NOTE — PROGRESS NOTE ADULT - ATTENDING COMMENTS
Patient was seen and examined at bedside and agree with above plan   Patient had few recurrent episodes of brief unprovoked dizziness. One of those episodes was a/w sinus bradycardia in the low 30s/high 20s (though the tele tracing has lots of motions artifacts). This was witnessed by the RN who reported that his eye rolled to the back and he looked like if he is going to pass out. No pauses longer than 3 seconds.   Given the recurrent symptoms, we elected to postponed the stress test (was intended to assess rate, rhythm and conduction with exercise). He underwent a CT scan which suspected significant CAD and so he is scheduled to undergo an angiogram. Another 3.5 seconds pause reported during CT scan (yet to be confirmed  TSH normal Lyme pending     Post Cardiac cta Calcium score-51(LAD-4, Circ.-46) There is a high-grade narrowing of the mid right coronary artery, which may be exaggerated by the motion artifact. The distal portion of the right coronary arteries obscured. There is moderate narrowing within the mid LAD, which may also be exaggerated by motion artifact. In addition there is mild narrowing in the proximal portion of the circumflex artery, and a calcified plaque in the first obtuse marginal branch (large vessel) which results in high-grade stenosis.    Patient of note does have vertebral artery stenosis with normal perfusion of the brain and as per NSx interventionalist no intervention at this time.    S/P LHC with CHETAN x2 to the RCA, will monitor over the weekend and assess for any recurrence of syncope and bradycardic episodes.   EP on board and agree with rule out any reason reversible reason for episodes and if no irreversible reason for cardio inhibitory vagal syncope or sinus node dysfunction then will need PM   will monitor till monday, continue with telemetry monitoring     Zoey Herzog D.O. [facility]  Cardiology/Vascular Cardiology -[facility]   Telephone # [phone]

## 2021-07-30 NOTE — PROGRESS NOTE ADULT - SUBJECTIVE AND OBJECTIVE BOX
patient underwent coronary angiogram.   Full report to follow.     RCA with severe disease in mid and distal portion. PCI with 2 CHETAN.   Moderate LAD disease. iFR negative.     ? of anamika and sxs secondary to RCA lesions. Unclear.   Would monitor patient for another alteast 48-72 hours in the hospital. Will defer to EP service for further management.

## 2021-07-30 NOTE — CONSULT NOTE ADULT - ASSESSMENT
The patient is a 60y Male who is followed by neurology because of recurrent syncope, right arm paresthesia and vertebral artery occlusion    Right vertebral artery occlusion  will need MRI to eval for stroke  if stroke may need eval for intervention  will need lipid panel, ASA and plavix    Syncope  feels rapid heart rate, sweats and syncope/near syncope  several episodes since Saturday  suggest cardiology eval  telemetry    will follow with you    Adonis Kidd MD PhD   [id number]  
ASSESSMENT:  60 M with multiple Presyncopal/ Syncopal episodes found with nondominant hypoplastic right vertebral artery with stenosis at the origin and ?hypoplastic right vert with PICA termination versus right vert occluded. Dominant left vert with no stenosis and supplies basilar artery. MRI negative for stroke. Likely vertebral artery not cause of patient's dizziness and is from his symptomatic bradycardia.     -After this morning's examination, patient stated he felt an episode coming on. Patient became flushed, diaphoretic and very dizzy. Telemetry revealed a sinus bradycardia with rate in the 20's. /62 at the time. No LOC. Within few seconds, patient HR 60's and symptoms resolved. Hospitalist at bedside and Cardiology called.    PLAN:  -Imaging and plan reviewed with Neuro Interventionalist Sam Santiago  -No acute endovascular interventions needed at this time.  -MRI negative for stroke  -Recommend aspirin daily  -Neurology to follow  -pending cardiology work up  -Will sign off at this time, please consult PRN
Pt is a 61 y/o male with no known medical history who presents to [facility]ED for syncope. 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 Pt states he LOC for about 30 seconds, then he woke up with diaphoresis, and a headache. Pt drank water, and felt better afterwards. On Sunday 7/25, patient felt same symptoms without LOC, while sitting in a chair-symptoms lasted 1 minute in duration. Tuesday 7/27, pt had dizziness while sitting in bed without getting up, no LOC. Pt called in sick from work and came to ED where CT Head-mild volume loss and involutional change, with no acute abnormality or hemorrhagic focus noted. Pt was DC'd with diagnosis of dehydration and was advised to increase PO water intake. Wednesday 7/28- Pt had same symptoms while at work without LOC, symptoms lasted 1 minute. This morning 7/29, patient woke up with a HA sweating profusely room spinning. Pt went back to sleep woke up @ 7AM felt same symptoms again when he got up from bed. Pt denies chest pain, SOB, palpitations, fevers, chills, coughing prior to any syncopal event. In ED, Tropx2-negative, CTA-Occluded right vertebral artery V4 segment, CTA Neck-No hemodynamically significant stenosis of the bilateral cervical ICAs using NASCET criteria. 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 Patient had 2 pre-syncopal events while in hospital, no LOC. During one event, patient was noted to have bradycardia in 30s, with questionable pause.       Syncope with Bradycardia  -Tropx2-negative  -ECG-NSR HR @ 64  -Tele-SB HR @ 40-50s  -CTA-Occluded right vertebral artery V4 segment  -CTA Neck-No hemodynamically significant stenosis of the bilateral cervical ICAs using NASCET criteria. 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 Two solid right upper lobe nodules measuring up to 5 mm  -Echo-pending results  -TSH-pending  -Cont. tele monitoring  -Cont. to trend trop  -Orthostatic BP reading-pending  -Recommend r/o seizure activity with EEG    -EP consult requested

## 2021-07-30 NOTE — PROGRESS NOTE ADULT - SUBJECTIVE AND OBJECTIVE BOX
[facility]                                        [facility] [place]                                 Bernabe Sanchez, & Alcides                                  [address]                                        [place], [place], [address]                                             [phone]        CC: Syncope    HPI:   The patient is a 60y Male who presented with several episodes of syncope/near syncope since Saturday.  He says that he feels dizzy and has rapid heart rate and sweats then he has tingling in right arm.  He then either passes out or almost does.  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    Interim history:  Now on 6 Tower.     ROS:   Denies headache or dizziness.  Denies chest pain.  Denies shortness of breath.    MEDICATIONS  (STANDING):  enoxaparin Injectable 40 milliGRAM(s) SubCutaneous daily  lactated ringers. 1000 milliLiter(s) (100 mL/Hr) IV Continuous <Continuous>      Vital Signs Last 24 Hrs  T(C): 36.6 (30 Jul 2021 05:51), Max: 37 (29 Jul 2021 14:52)  T(F): 97.8 (30 Jul 2021 05:51), Max: 98.6 (29 Jul 2021 14:52)  HR: 46 (30 Jul 2021 05:51) (46 - 87)  BP: 110/61 (30 Jul 2021 05:51) (110/61 - 162/87)  RR: 17 (30 Jul 2021 05:51) (17 - 20)  SpO2: 100% (30 Jul 2021 05:51) (96% - 100%)    Detailed Neurologic Exam:    Mental status: The patient is awake and alert. There is no aphasia. There is no dysarthria.     Cranial nerves: Pupils equal and react symmetrically to light. There is no visual field deficit to threat. Extraocular motion is full with no nystagmus. Facial sensation is intact. Facial musculature is symmetric. Palate elevates symmetrically. Tongue is midline.    Motor: There is normal bulk and tone.  There is no tremor.  Strength grossly 5/5 bilaterally.    Sensation: Grossly intact to light touch and pin.    Reflexes: 2+ throughout and plantar responses are flexor.    Cerebellar: No dysmetria on finger nose testing.    Labs:     07-30    136  |  102  |  12.6  ----------------------------<  96  5.1   |  25.0  |  0.97    Ca    8.9      30 Jul 2021 06:30  Mg     1.6     07-30    TPro  7.1  /  Alb  4.1  /  TBili  1.1  /  DBili  x   /  AST  41<H>  /  ALT  27  /  AlkPhos  145<H>  07-29                            13.4   5.18  )-----------( 228      ( 30 Jul 2021 06:30 )             39.0       MRI brain images reviewed (and concur with report): There is no acute pathology.

## 2021-07-30 NOTE — PROGRESS NOTE ADULT - SUBJECTIVE AND OBJECTIVE BOX
CC: syncope with bradycardia     INTERVAL HPI/OVERNIGHT EVENTS: Patient seen and examined. Called urgently by RN, HR in 20-30's? Patient reportedly with "eyes rolling back". When arrived in room HR in 50's. Patient awake and alert but c/o feeling like, "I was about to go out". EP at bedside, orthostatics performed. BP sitting to standing dropped from 165-147. HR did not go down and jb with change in position, however patient started to again feel lightheaded and was returned to bed. Denied chest pain, nausea, vomiting. Pacer pads placed on patient, transferred to Step down.     Vital Signs Last 24 Hrs  T(C): 36.6 (30 Jul 2021 05:51), Max: 37 (29 Jul 2021 14:52)  T(F): 97.8 (30 Jul 2021 05:51), Max: 98.6 (29 Jul 2021 14:52)  HR: 52 (30 Jul 2021 10:06) (27 - 63)  BP: 169/73 (30 Jul 2021 10:06) (110/61 - 169/73)  BP(mean): --  RR: 17 (30 Jul 2021 05:51) (17 - 18)  SpO2: 100% (30 Jul 2021 05:51) (96% - 100%)      Physical Exam:   Physical Exam: PHYSICAL EXAM:  GENERAL: NAD, well-developed  HEAD:  Atraumatic, Normocephalic  EYES: EOMI, PERRLA, conjunctiva and sclera clear  NECK: Supple, No JVD  CHEST/LUNG: Clear to auscultation bilaterally; No wheeze  HEART:  bradycardic, regular, no murmur  ABDOMEN: Soft, Nontender, Nondistended; Bowel sounds present  EXTREMITIES:  2+ Peripheral Pulses, No clubbing, cyanosis, or edema  PSYCH: AAOx3  NEUROLOGY: non-focal  SKIN: No rashes or lesions      I&O's Detail    29 Jul 2021 07:01  -  30 Jul 2021 07:00  --------------------------------------------------------  IN:    Lactated Ringers: 100 mL  Total IN: 100 mL    OUT:  Total OUT: 0 mL    Total NET: 100 mL      30 Jul 2021 07:01  -  30 Jul 2021 10:53  --------------------------------------------------------  IN:    Lactated Ringers: 300 mL  Total IN: 300 mL    OUT:  Total OUT: 0 mL    Total NET: 300 mL          CARDIAC MARKERS ( 30 Jul 2021 06:30 )  x     / <0.01 ng/mL / x     / x     / x      CARDIAC MARKERS ( 29 Jul 2021 15:55 )  x     / <0.01 ng/mL / x     / x     / x      CARDIAC MARKERS ( 29 Jul 2021 12:46 )  x     / <0.01 ng/mL / x     / x     / x      CARDIAC MARKERS ( 29 Jul 2021 09:41 )  x     / <0.01 ng/mL / x     / x     / x                                13.4   5.18  )-----------( 228      ( 30 Jul 2021 06:30 )             39.0     30 Jul 2021 06:30    136    |  102    |  12.6   ----------------------------<  96     5.1     |  25.0   |  0.97     Ca    8.9        30 Jul 2021 06:30  Mg     1.6       30 Jul 2021 06:30    TPro  7.1    /  Alb  4.1    /  TBili  1.1    /  DBili  x      /  AST  41     /  ALT  27     /  AlkPhos  145    29 Jul 2021 09:41    PT/INR - ( 29 Jul 2021 09:41 )   PT: 13.1 sec;   INR: 1.14 ratio         PTT - ( 29 Jul 2021 09:41 )  PTT:27.9 sec  CAPILLARY BLOOD GLUCOSE        LIVER FUNCTIONS - ( 29 Jul 2021 09:41 )  Alb: 4.1 g/dL / Pro: 7.1 g/dL / ALK PHOS: 145 U/L / ALT: 27 U/L / AST: 41 U/L / GGT: x               MEDICATIONS  (STANDING):  atropine Injectable 0.5 milliGRAM(s) IV Push once  enoxaparin Injectable 40 milliGRAM(s) SubCutaneous daily  lactated ringers. 1000 milliLiter(s) (100 mL/Hr) IV Continuous <Continuous>    MEDICATIONS  (PRN):  acetaminophen   Tablet .. 650 milliGRAM(s) Oral every 6 hours PRN Temp greater or equal to 38.5C (101.3F), Mild Pain (1 - 3)  aluminum hydroxide/magnesium hydroxide/simethicone Suspension 30 milliLiter(s) Oral every 4 hours PRN Dyspepsia  atropine Injectable 0.5 milliGRAM(s) IV Push once PRN Symptomatic bradycadia  ondansetron Injectable 4 milliGRAM(s) IV Push every 8 hours PRN Nausea and/or Vomiting      RADIOLOGY & ADDITIONAL TESTS:   CC: syncope with bradycardia     INTERVAL HPI/OVERNIGHT EVENTS: Patient seen and examined. Called urgently by RN, HR in 20-30's? Patient reportedly with "eyes rolling back". When arrived in room HR in 50's. Patient awake and alert but c/o feeling like, "I was about to go out". EP at bedside, orthostatics performed. BP sitting to standing dropped from 165-147. HR did not go down and jb with change in position, however patient started to again feel lightheaded and was returned to bed. Denied chest pain, nausea, vomiting. Pacer pads placed on patient, transferred to Step down.     Vital Signs Last 24 Hrs  T(C): 36.6 (30 Jul 2021 05:51), Max: 37 (29 Jul 2021 14:52)  T(F): 97.8 (30 Jul 2021 05:51), Max: 98.6 (29 Jul 2021 14:52)  HR: 52 (30 Jul 2021 10:06) (27 - 63)  BP: 169/73 (30 Jul 2021 10:06) (110/61 - 169/73)  BP(mean): --  RR: 17 (30 Jul 2021 05:51) (17 - 18)  SpO2: 100% (30 Jul 2021 05:51) (96% - 100%)      PHYSICAL EXAM:  GENERAL: lying in bed, having these episodes where he goes pale and states he feels all the blood drained and feels "horrible". lasts 1 sec or 2 and resolves spontaneously  HEAD:  Atraumatic, Normocephalic  EYES: EOMI, conjunctiva and sclera clear  NECK: Supple, No JVD  CHEST/LUNG: Clear to auscultation bilaterally; No wheeze  HEART:  bradycardic, regular, no murmur  ABDOMEN: Soft, Nontender, Nondistended; Bowel sounds present  EXTREMITIES:  2+ Peripheral Pulses, No clubbing, cyanosis, or edema  PSYCH: AAOx3  NEUROLOGY: non-focal  SKIN: No rashes or lesions      I&O's Detail    29 Jul 2021 07:01  -  30 Jul 2021 07:00  --------------------------------------------------------  IN:    Lactated Ringers: 100 mL  Total IN: 100 mL    OUT:  Total OUT: 0 mL    Total NET: 100 mL      30 Jul 2021 07:01  -  30 Jul 2021 10:53  --------------------------------------------------------  IN:    Lactated Ringers: 300 mL  Total IN: 300 mL    OUT:  Total OUT: 0 mL    Total NET: 300 mL          CARDIAC MARKERS ( 30 Jul 2021 06:30 )  x     / <0.01 ng/mL / x     / x     / x      CARDIAC MARKERS ( 29 Jul 2021 15:55 )  x     / <0.01 ng/mL / x     / x     / x      CARDIAC MARKERS ( 29 Jul 2021 12:46 )  x     / <0.01 ng/mL / x     / x     / x      CARDIAC MARKERS ( 29 Jul 2021 09:41 )  x     / <0.01 ng/mL / x     / x     / x                                13.4   5.18  )-----------( 228      ( 30 Jul 2021 06:30 )             39.0     30 Jul 2021 06:30    136    |  102    |  12.6   ----------------------------<  96     5.1     |  25.0   |  0.97     Ca    8.9        30 Jul 2021 06:30  Mg     1.6       30 Jul 2021 06:30    TPro  7.1    /  Alb  4.1    /  TBili  1.1    /  DBili  x      /  AST  41     /  ALT  27     /  AlkPhos  145    29 Jul 2021 09:41    PT/INR - ( 29 Jul 2021 09:41 )   PT: 13.1 sec;   INR: 1.14 ratio         PTT - ( 29 Jul 2021 09:41 )  PTT:27.9 sec  CAPILLARY BLOOD GLUCOSE        LIVER FUNCTIONS - ( 29 Jul 2021 09:41 )  Alb: 4.1 g/dL / Pro: 7.1 g/dL / ALK PHOS: 145 U/L / ALT: 27 U/L / AST: 41 U/L / GGT: x               MEDICATIONS  (STANDING):  atropine Injectable 0.5 milliGRAM(s) IV Push once  enoxaparin Injectable 40 milliGRAM(s) SubCutaneous daily  lactated ringers. 1000 milliLiter(s) (100 mL/Hr) IV Continuous <Continuous>    MEDICATIONS  (PRN):  acetaminophen   Tablet .. 650 milliGRAM(s) Oral every 6 hours PRN Temp greater or equal to 38.5C (101.3F), Mild Pain (1 - 3)  aluminum hydroxide/magnesium hydroxide/simethicone Suspension 30 milliLiter(s) Oral every 4 hours PRN Dyspepsia  atropine Injectable 0.5 milliGRAM(s) IV Push once PRN Symptomatic bradycadia  ondansetron Injectable 4 milliGRAM(s) IV Push every 8 hours PRN Nausea and/or Vomiting      RADIOLOGY & ADDITIONAL TESTS:

## 2021-07-30 NOTE — PROGRESS NOTE ADULT - ASSESSMENT
60y Male who is followed by neurology because of recurrent syncope, right arm paresthesia and vertebral artery occlusion    Right vertebral artery occlusion  MRI negative for stroke.    Syncope  Feels rapid heart rate, sweats and syncope/near syncope.  Several episodes since Saturday.  Awaiting EEG.     Case discussed with Dr Montero.

## 2021-07-30 NOTE — PROGRESS NOTE ADULT - SUBJECTIVE AND OBJECTIVE BOX
60 year male patient, current smoker, with no known medical history who presents once again with syncope. 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 He states he LOC for about 30 seconds, then he woke up with diaphoresis, and a headache. Hedrank water, and felt better afterwards. On , patient felt same symptoms without LOC, while sitting in a chair-symptoms lasted 1 minute in duration. , pt had dizziness while sitting in bed without getting up, no LOC. He called in sick from work and came to ED where CT Head-mild volume loss and involutional change, with no acute abnormality or hemorrhagic focus noted. - Pt had same symptoms while at work without LOC, symptoms lasted 1 minute. This morning , patient woke up with a HA sweating profusely room spinning. Patient then went back to sleep woke up @ 7AM felt same symptoms again when he got up from bed. He denies chest pain, SOB, palpitations, fevers, chills, coughing prior to any syncopal event.    In  the patient was hospitalized at [facility] with headache, nausea and blurred vision. CT, MRI, and MRA were essentially negative and patient was asked to follow up with Dr. Herman as outpatient     Interval  -:  Patient had few recurrent episodes of brief unprovoked dizziness. One of those episodes was a/w sinus bradycardia in the low 30s/high 20s (though the tele tracing has lots of motions artifacts). This was witnessed by the RN who reported that his eye rolled to the back and he looked like if he is going to pass out. No pauses longer than 3 seconds. I saw him this morning and given his orthostatic hypotension yesterday, we elected to perform another orthostatic vitals with me and the medicine ACP and his RN at the bedside. His lying SBP was around 160 mmHg with HR in the mid 50s. 2 minutes after standing he felt the same brief prodrome and that he will pass out. His SBP was 147 mmHg and his HR was actually in the 70s.   Given the recurrent symptoms, we elected to postponed the stress test (was intended to assess rate, rhythm and conduction with exercise). He underwent a CT scan which suspected signficant CAD and so he is scheduled to undergo an angiogram. Another 3.5 seconds pause reported during CT scan (yet to be confirmed by myself/pt is in test)  Talked to Dr. Pérez from neurology, very unlikely that his dizziness and syncope are caused by a neurological etiology. Will plan to do an EEG  TSH is normal  Lyme is pending       PAST MEDICAL & SURGICAL HISTORY:  No pertinent past medical history  History of arthroscopic knee surgery    REVIEW OF SYSTEMS  General: - fever or chills, 	  Skin/Breast: - rashes  Ophthalmologic: + blurred vision R field	  ENMT: - sore throat  Respiratory and Thorax: - dyspnea or cough  Cardiovascular: - chest pain. + palps after episodes  Gastrointestinal:	- N/V/D/C  Genitourinary: - dysuria  Musculoskeletal:	 - arthritis  Neurological: +HA, +sycnope + Right upper extremity weakness with vision changes in right field  Psychiatric: - anxiety     MEDICATIONS  (STANDING):  lactated ringers. 1000 milliLiter(s) (100 mL/Hr) IV Continuous <Continuous>    Allergies  No Known Allergies    SOCIAL HISTORY: current smoker: 1ppd x 35 years  6 beers daily    FAMILY HISTORY: adopted    Vital Signs Last 24 Hrs  T(C): 36.6 (2021 16:15), Max: 37.1 (2021 11:00)  T(F): 97.9 (2021 16:15), Max: 98.8 (2021 11:00)  HR: 48 (2021 16:15) (27 - 63)  BP: 177/83 (2021 16:15) (110/61 - 177/83)  BP(mean): 112 (2021 11:00) (112 - 112)  RR: 17 (2021 16:15) (17 - 18)  SpO2: 100% (2021 16:15) (96% - 100%)  Orthostatic vital as above     Physical Exam:  Constitutional: AAOx3, NAD, thin appearing  Neck: supple, No JVD  Cardiovascular: +S1S2 RRR, no murmurs, rubs, gallops   Pulmonary: CTA b/l, unlabored, no wheezes, rales. No rhonchi  Abdomen: +BS, soft NTND  Extremities: no edema b/l,   Neuro: non focal, speech clear, PEARL x 4  Psych: no anxiety    LABS:                                   13.4   5.18  )-----------( 228      ( 2021 06:30 )             39.0   07    136  |  102  |  12.6  ----------------------------<  96  5.1   |  25.0  |  0.97    Ca    8.9      2021 06:30  Mg     1.6         TPro  7.1  /  Alb  4.1  /  TBili  1.1  /  DBili  x   /  AST  41<H>  /  ALT  27  /  AlkPhos  145<H>        TSH is normal    RADIOLOGY & ADDITIONAL STUDIES:  < from: CT Angio Cardiac w/ IV Cont (21 @ 14:20) >  IMPRESSION:  The study is limited by motion artifact  There is a high-grade narrowing of the mid right coronary artery, which may be exaggerated by the motion artifact. The distal portion of the right coronary arteries obscured  There is moderate narrowing within the mid LAD, which may also be exaggerated by motion artifact  In addition there is mild narrowing in the proximal portion of the circumflex artery, and a calcified plaque in the first obtuse marginal branch (large vessel) which results in high-grade stenosis.    < end of copied text >      < from: TTE Echo Complete w/o Contrast w/ Doppler (21 @ 13:36) >  Summary:   1. Normal left atrial size.   2. Color flow doppler and intravenous injection of agitated saline demonstrates the presence of an intact intra atrial septum.   3. Normal wall motion. Left ventricular ejection fraction, by visual estimation, is 65 to 70%. Grade I diastolic dysfunction.   4. Normal right atrial size.   5. Normal right ventricular size and function.   6. No significant valvular abnormality.   7. There is no evidence of pericardial effusion.    < end of copied text >      CT angio neck: 21  IMPRESSION:    Brain CT without contrast:  No evidence of intracranial hemorrhage or mass effect. If clinical suspicion for acute infarct persists, brain MRI can be obtained if there is no contraindication.  CT perfusion:  No perfusion abnormality.  CT angiography neck:  1.  No hemodynamically significant stenosis of the bilateral cervical ICAs using NASCET criteria.  2.  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  3.  Two solid right upper lobe nodules measuring up to 5 mm. Consider nonemergent CT chest.  CT angiography brain: Occluded right vertebral artery V4 segment.  CT head and CT perfusion findings were discussed with Dr. MAX HADLEY [phone] at 2021 9:50 AM by Dr. Elroy Richter with read back confirmation.  Finding of right vertebral artery occlusion was discussed with Dr. Charles at 2021 10:15 AM by Dr. Elroy Richter with read back confirmation.    MRI 21  IMPRESSION:  No acute intracranial hemorrhage or acute infarct.    EK21: SR at 77bpm; QRSD 80ms; QT 420ms

## 2021-07-30 NOTE — PROGRESS NOTE ADULT - SUBJECTIVE AND OBJECTIVE BOX
Department of Cardiology                                                                  [facility]/[place] [address] [facility]                                                            Telephone: [phone]. Fax:[phone]                                                    Post- Procedure Note: Left Heart Cardiac Catheterization       Narrative:  60y  Male is now s/p left heart catheterization via right radial approach with Dr. Belcher  Right radial access +hemoband; no access complications  mRCA 80% -->resolute kevrin 3.0 x24mm  dRCA 70% --> resolute kervin 2.74 x20mm  IVUS used with intervention  pLAD moderate disease 40-50% iFR 0.95  Cx-OM 50% iFR 0.98  Anticoagulation used: Heparin 7,000 units  Contrast used: omnipaque 142ml  Antiplatelet used: plavix 600mg  IVF: Normal saline 350ml         PAST MEDICAL & SURGICAL HISTORY:  No pertinent past medical history    History of arthroscopic knee surgery      FAMILY HISTORY:  No pertinent family history in first degree relatives    Patient is a 60y old  Male who presents with a chief complaint of syncope with bradycardia (30 Jul 2021 18:18)    HPI: 61 y/o male with no known medical history who presents to [facility]-ED for syncope. 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 Pt states he LOC for about 30 seconds, then he woke up with diaphoresis, and a headache. On Sunday 7/25, patient felt same symptoms without LOC, while sitting in a chair-symptoms lasted 1 minute in duration. Tuesday 7/27, pt had dizziness while sitting in bed without getting up, no LOC. Pt called in sick from work and came to ED where CT Head-mild volume loss and involutional change, with no acute abnormality or hemorrhagic focus noted. Pt was DC'd with diagnosis of dehydration and was advised to increase PO water intake. Wednesday 7/28- Pt had same symptoms while at work without LOC, symptoms lasted 1 minute. This morning 7/29, patient woke up with a HA sweating profusely room spinning. Pt went back to sleep woke up @ 7AM felt same symptoms again when he got up from bed. Pt denies chest pain, SOB, palpitations, fevers, chills, coughing prior to any syncopal event. In ED, Tropx2-negative, CTA-Occluded right vertebral artery V4 segment, CTA Neck-No hemodynamically significant stenosis of the bilateral cervical ICAs using NASCET criteria. 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 Patient had 2 pre-syncopal events while in hospital, no LOC. During one event, patient was noted to have bradycardia in 30s, with questionable pause. Pt admitted for syncope and bradycardia. (29 Jul 2021 18:54)    General: No fatigue, no fevers/chills  Respiratory: No dyspnea, no cough, no wheeze  CV: No chest pain, no palpitations  Abd: No nausea  Neuro: No headache, no dizziness    Objective:  Vital Signs Last 24 Hrs  T(C): 36.6 (30 Jul 2021 16:15), Max: 37.1 (30 Jul 2021 11:00)  T(F): 97.9 (30 Jul 2021 16:15), Max: 98.8 (30 Jul 2021 11:00)  HR: 54 (30 Jul 2021 18:30) (27 - 64)  BP: 125/69 (30 Jul 2021 18:30) (110/61 - 177/83)  BP(mean): 112 (30 Jul 2021 11:00) (112 - 112)  RR: 18 (30 Jul 2021 18:30) (17 - 18)  SpO2: 98% (30 Jul 2021 18:30) (96% - 100%)    CM: Sinus bradycardia 50s  Neuro: A&OX3, CN 2-12 intact  HEENT: NC, AT  Lungs: CTA B/L  CV: S1, S2, no murmur  Abd: Soft  Right radial: Soft, no bleeding, no hematoma  Extremity: + distal pulses                          13.4   5.18  )-----------( 228      ( 30 Jul 2021 06:30 )             39.0     07-30    136  |  102  |  12.6  ----------------------------<  96  5.1   |  25.0  |  0.97    Ca    8.9      30 Jul 2021 06:30  Mg     1.6     07-30    TPro  7.1  /  Alb  4.1  /  TBili  1.1  /  DBili  x   /  AST  41<H>  /  ALT  27  /  AlkPhos  145<H>  07-29  PT/INR - ( 29 Jul 2021 09:41 )   PT: 13.1 sec;   INR: 1.14 ratio    PTT - ( 29 Jul 2021 09:41 )  PTT:27.9 sec

## 2021-07-30 NOTE — PROGRESS NOTE ADULT - SUBJECTIVE AND OBJECTIVE BOX
[place] [facility]                                                               Office: [address] [place] [address]                                                              Telephone: [phone]. Fax:[phone]                                                                             PROGRESS NOTE  Reason for follow up: syncope with bradycardia  Overnight: No new events.   Update: 7/30: Pt denies chest pain, palpitations, SOB. Tele-SB HR @ 40-50s, patient near syncopal episode this AM with bradycardia to 20s. CCTA-Calcium score-51(LAD-4, Circ.-46) There is a high-grade narrowing of the mid right coronary artery, which may be exaggerated by the motion artifact. The distal portion of the right coronary arteries obscured. There is moderate narrowing within the mid LAD, which may also be exaggerated by motion artifact. In addition there is mild narrowing in the proximal portion of the circumflex artery, and a calcified plaque in the first obtuse marginal branch (large vessel) which results in high-grade stenosis. Plan for cath 7/30, maintain NPO.     Subjective: No new events    	  Vitals:  T(C): 37.1 (07-30-21 @ 11:00), Max: 37.1 (07-30-21 @ 11:00)  HR: 56 (07-30-21 @ 11:00) (27 - 63)  BP: 162/86 (07-30-21 @ 11:00) (110/61 - 169/73)  RR: 17 (07-30-21 @ 11:00) (17 - 18)  SpO2: 98% (07-30-21 @ 11:00) (96% - 100%)    I&O's Summary    29 Jul 2021 07:01  -  30 Jul 2021 07:00  --------------------------------------------------------  IN: 100 mL / OUT: 0 mL / NET: 100 mL    30 Jul 2021 07:01  -  30 Jul 2021 15:35  --------------------------------------------------------  IN: 800 mL / OUT: 0 mL / NET: 800 mL      Weight (kg): 67.1 (07-29 @ 08:49)      PHYSICAL EXAM:  Appearance: Comfortable. No acute distress  HEENT:  Head and neck: Atraumatic. Normocephalic.  Normal oral mucosa, PERRL, Neck is supple. No JVD, No carotid bruit.   Neurologic: A & O x 3, no focal deficits. EOMI.  Lymphatic: No cervical lymphadenopathy  Cardiovascular: Normal S1 S2, No murmur, rubs/gallops. No JVD, No edema  Respiratory: Lungs clear to auscultation  Gastrointestinal:  Soft, Non-tender, + BS  Lower Extremities: No edema  Psychiatry: Patient is calm. No agitation. Mood & affect appropriate  Skin: No rashes/ ecchymoses/cyanosis/ulcers visualized on the face, hands or feet.      CURRENT MEDICATIONS:  atropine Injectable  enoxaparin Injectable  lactated ringers.      DIAGNOSTIC TESTING:    [ ] Echocardiogram: < from: TTE Echo Complete w/o Contrast w/ Doppler (07.29.21 @ 13:36) >  Summary:   1. Normal left atrial size.   2. Color flow doppler and intravenous injection of agitated saline demonstrates the presence of an intact intra atrial septum.   3. Normal wall motion. Left ventricular ejection fraction, by visual estimation, is 65 to 70%. Grade I diastolic dysfunction.   4. Normal right atrial size.   5. Normal right ventricular size and function.   6. No significant valvular abnormality.   7. There is no evidence of pericardial effusion.      OTHER: 	    CCTA:< from: CT Angio Cardiac w/ IV Cont (07.30.21 @ 14:20) >    FINDINGS:    VISUALIZED LUNGS: Mild emphysema  MEDIASTINUM:  no significant mediastinal adenopathy.  BONES:  Degenerative changes  AORTA: No thoracic aortic dissection is noted in the visualized thoracic aorta.  PULMONARY ARTERIES: No pulmonary embolus is noted within the visualized central pulmonary arteries.  PERICARDIUM/CARDIAC STRUCTURES:  normal    CORONARY:  -DOMINANCE: Codominant  -LEFT MAIN CORONARY ARTERY: Patent  -ANTERIOR DESCENDING ARTERY:  -PROX: Calcified plaque without narrowing       -MID: Limited evaluation by motion.. Moderate narrowing may be exaggerated by motion.       -DISTAL:  Patent  -CIRCUMFLEX ARTERY:       -PROX: Mild narrowing the proximal portion. There is a calcified plaque in the first obtuse marginal artery which results in high-grade stenosis.       -MID:  Patent       -DISTAL:  Patent  -RIGHT CORONARY ARTERY:       -PROX:  Patent       -MID: High-grade narrowing, may be exaggerated by motion artifact       -DISTAL: obscured by motion artifact      Myocardial Function:  The left ventricle is of normal size, wall thickness and function. Cine display demonstrates no regional wall motion abnormality. LVEDV= 140 cc; LVESV= 50 cc. Calculated ejection fraction is 64%.    CALCIUM SCORE  Agatston Equivalent Score    Segment                                Score  --------------------------------------------------  Left Main                                0  Left anterior Descending          4  Circumflex                   46  Right                                      0  --------------------------------------------------  Total                                      51      IMPRESSION:    The study is limited by motion artifact    There is a high-grade narrowing of the mid right coronary artery, which may be exaggerated by the motion artifact. The distal portion of the right coronary arteries obscured    There is moderate narrowing within the mid LAD, which may also be exaggerated by motion artifact    In addition there is mild narrowing in the proximal portion of the circumflex artery, and a calcified plaque in the first obtuse marginal branch (large vessel) which results in high-grade stenosis.    MR:< from: MR Head No Cont (07.29.21 @ 13:47) >    IMPRESSION:  No acute intracranial hemorrhage or acute infarct.      LABS:	 	  CARDIAC MARKERS ( 30 Jul 2021 06:30 )  x     / <0.01 ng/mL / x     / x     / x      p-BNP 30 Jul 2021 06:30: x    , CARDIAC MARKERS ( 29 Jul 2021 15:55 )  x     / <0.01 ng/mL / x     / x     / x      p-BNP 29 Jul 2021 15:55: x    , CARDIAC MARKERS ( 29 Jul 2021 12:46 )  x     / <0.01 ng/mL / x     / x     / x      p-BNP 29 Jul 2021 12:46: x    , CARDIAC MARKERS ( 29 Jul 2021 09:41 )  x     / <0.01 ng/mL / x     / x     / x      p-BNP 29 Jul 2021 09:41: x                              13.4   5.18  )-----------( 228      ( 30 Jul 2021 06:30 )             39.0     07-30    136  |  102  |  12.6  ----------------------------<  96  5.1   |  25.0  |  0.97    Ca    8.9      30 Jul 2021 06:30  Mg     1.6     07-30    TPro  7.1  /  Alb  4.1  /  TBili  1.1  /  DBili  x   /  AST  41<H>  /  ALT  27  /  AlkPhos  145<H>  07-29      Lipid Profile: Date: 07-29 @ 12:46  Total cholesterol 173; Direct LDL: --; HDL: 57; Triglycerides:71      TSH: Thyroid Stimulating Hormone, Serum: 1.71 uIU/mL        TELEMETRY:  SB HR @ 40-50s

## 2021-07-30 NOTE — PROGRESS NOTE ADULT - ASSESSMENT
Pt is a 59 y/o male with no known medical history who presents to [facility]-ED for syncope. 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 Pt states he LOC for about 30 seconds, then he woke up with diaphoresis, and a headache. Pt drank water, and felt better afterwards. On Sunday 7/25, patient felt same symptoms without LOC, while sitting in a chair-symptoms lasted 1 minute in duration. Tuesday 7/27, pt had dizziness while sitting in bed without getting up, no LOC. Pt called in sick from work and came to ED where CT Head-mild volume loss and involutional change, with no acute abnormality or hemorrhagic focus noted. Pt was DC'd with diagnosis of dehydration and was advised to increase PO water intake. Wednesday 7/28- Pt had same symptoms while at work without LOC, symptoms lasted 1 minute. This morning 7/29, patient woke up with a HA sweating profusely room spinning. Pt went back to sleep woke up @ 7AM felt same symptoms again when he got up from bed. Pt denies chest pain, SOB, palpitations, fevers, chills, coughing prior to any syncopal event. In ED, Tropx2-negative, CTA-Occluded right vertebral artery V4 segment, CTA Neck-No hemodynamically significant stenosis of the bilateral cervical ICAs using NASCET criteria. 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 Patient had 2 pre-syncopal events while in hospital, no LOC. During one event, patient was noted to have bradycardia in 30s, with questionable pause.    7/30: Pt denies chest pain, palpitations, SOB. Tele-SB HR @ 40-50s, patient near syncopal episode this AM with bradycardia to 20s. CCTA-Calcium score-51(LAD-4, Circ.-46) There is a high-grade narrowing of the mid right coronary artery, which may be exaggerated by the motion artifact. The distal portion of the right coronary arteries obscured. There is moderate narrowing within the mid LAD, which may also be exaggerated by motion artifact. In addition there is mild narrowing in the proximal portion of the circumflex artery, and a calcified plaque in the first obtuse marginal branch (large vessel) which results in high-grade stenosis. Plan for cath 7/30, maintain NPO.         Syncope with Bradycardia  -Tropx2-negative  -ECG-NSR HR @ 64  -Tele-SB HR @ 40-50s  -CTA-Occluded right vertebral artery V4 segment  -CTA Neck-No hemodynamically significant stenosis of the bilateral cervical ICAs using NASCET criteria. 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 Two solid right upper lobe nodules measuring up to 5 mm  -Echo-EF 65-70%, grade I diastolic dysfx., normal right atrial size, normal RV size and fx., no significant valvular abnormality  -TSH-1.71, WNL  -EP consult appreciated  -CCTA-Calcium score-51(LAD-4, Circ.-46) There is a high-grade narrowing of the mid right coronary artery, which may be exaggerated by the motion artifact. The distal portion of the right coronary arteries obscured. There is moderate narrowing within the mid LAD, which may also be exaggerated by motion artifact. In addition there is mild narrowing in the proximal portion of the circumflex artery, and a calcified plaque in the first obtuse marginal branch (large vessel) which results in high-grade stenosis  -Plan for cath 7/30, maintain NPO  Pt is a 59 y/o male with no known medical history who presents to [facility]ED for syncope. 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 Pt states he LOC for about 30 seconds, then he woke up with diaphoresis, and a headache. Pt drank water, and felt better afterwards. On Sunday 7/25, patient felt same symptoms without LOC, while sitting in a chair-symptoms lasted 1 minute in duration. Tuesday 7/27, pt had dizziness while sitting in bed without getting up, no LOC. Pt called in sick from work and came to ED where CT Head-mild volume loss and involutional change, with no acute abnormality or hemorrhagic focus noted. Pt was DC'd with diagnosis of dehydration and was advised to increase PO water intake. Wednesday 7/28- Pt had same symptoms while at work without LOC, symptoms lasted 1 minute. This morning 7/29, patient woke up with a HA sweating profusely room spinning. Pt went back to sleep woke up @ 7AM felt same symptoms again when he got up from bed. Pt denies chest pain, SOB, palpitations, fevers, chills, coughing prior to any syncopal event. In ED, Tropx2-negative, CTA-Occluded right vertebral artery V4 segment, CTA Neck-No hemodynamically significant stenosis of the bilateral cervical ICAs using NASCET criteria. 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 Patient had 2 pre-syncopal events while in hospital, no LOC. During one event, patient was noted to have bradycardia in 30s, with questionable pause.    7/30: Pt denies chest pain, palpitations, SOB. Tele-SB HR @ 40-50s, patient near syncopal episode this AM with bradycardia to 20s. CCTA-Calcium score-51(LAD-4, Circ.-46) There is a high-grade narrowing of the mid right coronary artery, which may be exaggerated by the motion artifact. The distal portion of the right coronary arteries obscured. There is moderate narrowing within the mid LAD, which may also be exaggerated by motion artifact. In addition there is mild narrowing in the proximal portion of the circumflex artery, and a calcified plaque in the first obtuse marginal branch (large vessel) which results in high-grade stenosis. Plan for cath 7/30, maintain NPO.         Syncope with Bradycardia  -Tropx2-negative  -ECG-NSR HR @ 64  -Tele-SB HR @ 40-50s  -CTA-Occluded right vertebral artery V4 segment  -CTA Neck-No hemodynamically significant stenosis of the bilateral cervical ICAs using NASCET criteria. 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 Two solid right upper lobe nodules measuring up to 5 mm  -Echo-EF 65-70%, grade I diastolic dysfx., normal right atrial size, normal RV size and fx., no significant valvular abnormality  -TSH-1.71, WNL  -EP consult appreciated  -CCTA-Calcium score-51(LAD-4, Circ.-46) There is a high-grade narrowing of the mid right coronary artery, which may be exaggerated by the motion artifact. The distal portion of the right coronary arteries obscured. There is moderate narrowing within the mid LAD, which may also be exaggerated by motion artifact. In addition there is mild narrowing in the proximal portion of the circumflex artery, and a calcified plaque in the first obtuse marginal branch (large vessel) which results in high-grade stenosis  -Plan for cath 7/30, maintain NPO     Cardiology NP Addendum:   -Above reviewed.  Patient seen in CCL RR by me with no change to above PE  -For LHC with Dr. Belcher  ASA 2  Mallampati 2  GFR 84  BRA 1.1%  -NPO for procedure  -Consent to be obtained by Dr. Belcher

## 2021-07-30 NOTE — CONSULT NOTE ADULT - CONSULT REASON
Evaluation for pacemaker insertion
Syncope
right arm numbness, syncope
right vertebral artery occlusion

## 2021-07-30 NOTE — PROGRESS NOTE ADULT - ASSESSMENT
61 y/o male with no known medical history who presents to [facility]-ED for syncope. 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 Pt states he LOC for about 30 seconds, then he woke up with diaphoresis, and a headache. On Sunday 7/25, patient felt same symptoms without LOC, while sitting in a chair-symptoms lasted 1 minute in duration. Tuesday 7/27, pt had dizziness while sitting in bed without getting up, no LOC. Pt called in sick from work and came to ED where CT Head-mild volume loss and involutional change, with no acute abnormality or hemorrhagic focus noted. Pt was DC'd with diagnosis of dehydration and was advised to increase PO water intake. Wednesday 7/28- Pt had same symptoms while at work without LOC, symptoms lasted 1 minute. This morning 7/29, patient woke up with a HA sweating profusely room spinning. Pt went back to sleep woke up @ 7AM felt same symptoms again when he got up from bed. Pt denies chest pain, SOB, palpitations, fevers, chills, coughing prior to any syncopal event. In ED, Tropx2-negative, CTA-Occluded right vertebral artery V4 segment, CTA Neck-No hemodynamically significant stenosis of the bilateral cervical ICAs using NASCET criteria. 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 Patient had 2 pre-syncopal events while in hospital, no LOC. During one event, patient was noted to have bradycardia in 30s, with questionable pause. Pt admitted for syncope and bradycardia.  On 7/30/21: Patient having recurrent episodes of symptomatic bradycardia this am.     1) Syncope with Bradycardia  -  cardio and EP  following  - atropine iv 0.5 mg prn for symptomatic bradycardia  - if persistent symptomatic bradycardia by need PPM  - Echo reviewed  - Transfer to Stepdown    2) Prophylactic measure  - DVT ppx: lovenox SQ, hold until determined if patient needs PPM today 59 y/o male with no known medical history who presents to [facility]ED for syncope. 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 Pt states he LOC for about 30 seconds, then he woke up with diaphoresis, and a headache. On Sunday 7/25, patient felt same symptoms without LOC, while sitting in a chair-symptoms lasted 1 minute in duration. Tuesday 7/27, pt had dizziness while sitting in bed without getting up, no LOC. Pt called in sick from work and came to ED where CT Head-mild volume loss and involutional change, with no acute abnormality or hemorrhagic focus noted. Pt was DC'd with diagnosis of dehydration and was advised to increase PO water intake. Wednesday 7/28- Pt had same symptoms while at work without LOC, symptoms lasted 1 minute. This morning 7/29, patient woke up with a HA sweating profusely room spinning. Pt went back to sleep woke up @ 7AM felt same symptoms again when he got up from bed. Pt denies chest pain, SOB, palpitations, fevers, chills, coughing prior to any syncopal event. In ED, Tropx2-negative, CTA-Occluded right vertebral artery V4 segment, CTA Neck-No hemodynamically significant stenosis of the bilateral cervical ICAs using NASCET criteria. 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 Patient had 2 pre-syncopal events while in hospital, no LOC. During one event, patient was noted to have bradycardia in 30s, with questionable pause. Pt admitted for syncope and bradycardia.  On 7/30/21: Patient having recurrent episodes of symptomatic bradycardia this am.     1) Syncope with Bradycardia on admission  # further symptomatic bradycardia episodes without syncope at bedside  Although events at home was posture change related, noted to have these events while lying in bed flat without any movement or exertion  events his HR goes to 20s with reactive HTN of SBP 160s or 170s  - Echo-EF 65-70%, grade I diastolic dysfx., normal right atrial size, normal RV size and fx., no significant valvular abnormality  - TSH WNL  - SS cardio and EP  following  - atropine iv 0.5 mg prn for symptomatic bradycardia  - Zolle pads placed  - if persistent symptomatic bradycardia by need PPM  - Lymes w/u ordered  - Transfer to Stepdown  - Plan- to have cardiac CTA, stress test followed by EPS +/- PPM  - Cardiac CT with high calcium score. 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    3) Prophylactic measure  - DVT ppx: ICDs 59 y/o male with no known medical history who presents to [facility]ED for syncope. 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 Pt states he LOC for about 30 seconds, then he woke up with diaphoresis, and a headache. On Sunday 7/25, patient felt same symptoms without LOC, while sitting in a chair-symptoms lasted 1 minute in duration. Tuesday 7/27, pt had dizziness while sitting in bed without getting up, no LOC. Pt called in sick from work and came to ED where CT Head-mild volume loss and involutional change, with no acute abnormality or hemorrhagic focus noted. Pt was DC'd with diagnosis of dehydration and was advised to increase PO water intake. Wednesday 7/28- Pt had same symptoms while at work without LOC, symptoms lasted 1 minute. This morning 7/29, patient woke up with a HA sweating profusely room spinning. Pt went back to sleep woke up @ 7AM felt same symptoms again when he got up from bed. Pt denies chest pain, SOB, palpitations, fevers, chills, coughing prior to any syncopal event. In ED, Tropx2-negative, CTA-Occluded right vertebral artery V4 segment, CTA Neck-No hemodynamically significant stenosis of the bilateral cervical ICAs using NASCET criteria. 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 Patient had 2 pre-syncopal events while in hospital, no LOC. During one event, patient was noted to have bradycardia in 30s, with questionable pause. Pt admitted for syncope and bradycardia.  On 7/30/21: Patient having recurrent episodes of symptomatic bradycardia this am.     1) Syncope with Bradycardia on admission  # further symptomatic bradycardia episodes without syncope at bedside  Although events at home was posture change related, noted to have these events while lying in bed flat without any movement or exertion  events his HR goes to 20s with reactive HTN of SBP 160s or 170s  - Echo-EF 65-70%, grade I diastolic dysfx., normal right atrial size, normal RV size and fx., no significant valvular abnormality  - TSH WNL  - SS cardio and EP  following  - atropine iv 0.5 mg prn for symptomatic bradycardia  - Zolle pads placed  - if persistent symptomatic bradycardia by need PPM  - Lymes w/u ordered  - Transfer to Stepdown  - Plan- to have cardiac CTA, stress test followed by EPS +/- PPM  - Cardiac CT with high calcium score. 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    3) Prophylactic measure  - DVT ppx: ICDs    ATTENDING ATTESTATION:    I saw and examined the patient, and were physically present for the key portions of the Evaluation and Management service provided. I agree with the above history, physical, and plan which I have reviewed and edited where appropriate.    witnessed the above episodes  cards and EP intervention called  NS evaluation called  upgrade level of care    Rest as above

## 2021-07-30 NOTE — PROGRESS NOTE ADULT - ASSESSMENT
59 y/o M admitted for syncope and bradycardia with +CCTA; now s/p cardiac catheterization with intervention and CHETAN placement x2 to the right coronary artery

## 2021-07-30 NOTE — PROGRESS NOTE ADULT - ASSESSMENT
This  60 year male patient, current smoker, with no known medical history who presents with multiple episodes of near syncope and syncope.    Orthostatics positive at time of presentation. Telemetry reveals episodes of sinus bradycardia with one drop P wave in one incident during episode of dizziness.   Dizzy episodes appear to have no obvious trigger and even occur at rest in seated position as observed in ED  No prior similar episodes and no known family Hx of malignant arrhythmia    Interval Hx 7/29-7/30:  Patient had few recurrent episodes of brief unprovoked dizziness. One of those episodes was a/w sinus bradycardia in the low 30s/high 20s (though the tele tracing has lots of motions artifacts). This was witnessed by the RN who reported that his eye rolled to the back and he looked like if he is going to pass out. No pauses longer than 3 seconds. I saw him this morning and given his orthostatic hypotension yesterday, we elected to perform another orthostatic vitals with me and the medicine ACP and his RN at the bedside. His lying SBP was around 160 mmHg with HR in the mid 50s. 2 minutes after standing he felt the same brief prodrome and that he will pass out. His SBP was 147 mmHg and his HR was actually in the 70s.   Given the recurrent symptoms, we elected to postponed the stress test (was intended to assess rate, rhythm and conduction with exercise). He underwent a CT scan which suspected signficant CAD and so he is scheduled to undergo an angiogram. Another 3.5 seconds pause reported during CT scan (yet to be confirmed by myself/pt is in test)  Talked to Dr. Pérez from neurology, very unlikely that his dizziness and syncope are caused by a neurological etiology. Will plan to do an EEG  TSH is normal  Lyme is pending     Impression/Plan:  Recurrent episodes of dizziness and one syncope. Most (except one that was postural with minimal drop in his BP and HR of 77 bpm) of his dizziness episodes while in hospital c/w unprovoked sinus bradycardia (down to the high 20s) and sinus pauses (< 3.5 seconds).   1. Agree with [facility] to investigate the CAD  2. f/u Lyme serology  3. Will have a higher threshold for PM at this stage, but he may need one If clear evidence of symptomatic irreversible sinus node dysfunction or severe cardio-inhibitory vagal syncope. A PM with rate drop features can be considered. Explained this to the pt.  4. Regardless of the cath results, would suggest tele monitoring at least till Monday 8/2. If pacing deemed unnecessary, then would consider MCOT or ILR for further monitoring.     will follow up  above d/w pt, primary team and Marcelino Belcher and Mino  This  60 year male patient, current smoker, with no known medical history who presents with multiple episodes of near syncope and syncope.    Orthostatics positive at time of presentation. Telemetry reveals episodes of sinus bradycardia with one drop P wave in one incident during episode of dizziness.   Dizzy episodes appear to have no obvious trigger and even occur at rest in seated position as observed in ED  No prior similar episodes and no known family Hx of malignant arrhythmia    Interval Hx 7/29-7/30:  Patient had few recurrent episodes of brief unprovoked dizziness. One of those episodes was a/w sinus bradycardia in the low 30s/high 20s (though the tele tracing has lots of motions artifacts). This was witnessed by the RN who reported that his eye rolled to the back and he looked like if he is going to pass out. No pauses longer than 3 seconds. I saw him this morning and given his orthostatic hypotension yesterday, we elected to perform another orthostatic vitals with me and the medicine ACP and his RN at the bedside. His lying SBP was around 160 mmHg with HR in the mid 50s. 2 minutes after standing he felt the same brief prodrome and that he will pass out. His SBP was 147 mmHg and his HR was actually in the 70s.   Given the recurrent symptoms, we elected to postponed the stress test (was intended to assess rate, rhythm and conduction with exercise). He underwent a CT scan which suspected signficant CAD and so he is scheduled to undergo an angiogram. Another 3.5 seconds pause reported during CT scan (yet to be confirmed by myself/pt is in test)  Talked to Dr. Pérez from neurology, very unlikely that his dizziness and syncope are caused by a neurological etiology. Will plan to do an EEG  TSH is normal  Lyme is pending     Impression/Plan:  Recurrent episodes of dizziness and one syncope. Most (except one that was postural with minimal drop in his BP and HR of 77 bpm) of his dizziness episodes while in hospital c/w unprovoked sinus bradycardia (down to the high 20s) and sinus pauses (< 3.5 seconds).   1. Agree with [facility] to investigate the CAD  2. f/u Lyme serology  3. Will have a higher threshold for PM at this stage, but he may need one If clear evidence of symptomatic irreversible sinus node dysfunction or severe cardio-inhibitory vagal syncope. A PM with rate drop features can be considered. Explained this to the pt.  4. Regardless of the cath results, would suggest tele monitoring at least till Monday 8/2. If pacing deemed unnecessary, then would consider MCOT or ILR for further monitoring.   5. No current indication for TVP.   6. Bed rest, ambulate to bathroom with assist only       will follow up  above d/w pt, primary team and Marcelino Belcher and Mino

## 2021-07-30 NOTE — CONSULT NOTE ADULT - SUBJECTIVE AND OBJECTIVE BOX
HPI:  61 y/o male with no known medical history who presents to [facility]-ED for syncope. 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 Pt states he LOC for about 30 seconds, then he woke up with diaphoresis, and a headache. On Sunday 7/25, patient felt same symptoms without LOC, while sitting in a chair-symptoms lasted 1 minute in duration. Tuesday 7/27, pt had dizziness while sitting in bed without getting up, no LOC. Pt called in sick from work and came to ED where CT Head-mild volume loss and involutional change, with no acute abnormality or hemorrhagic focus noted. Pt was DC'd with diagnosis of dehydration and was advised to increase PO water intake. Wednesday 7/28- Pt had same symptoms while at work without LOC, symptoms lasted 1 minute. This morning 7/29, patient woke up with a HA sweating profusely room spinning. Pt went back to sleep woke up @ 7AM felt same symptoms again when he got up from bed. Pt denies chest pain, SOB, palpitations, fevers, chills, coughing prior to any syncopal event. In ED, Tropx2-negative, CTA-Occluded right vertebral artery V4 segment, CTA Neck-No hemodynamically significant stenosis of the bilateral cervical ICAs using NASCET criteria. 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 Patient had 2 pre-syncopal events while in hospital, no LOC. During one event, patient was noted to have bradycardia in 30s, with questionable pause. Pt admitted for syncope and bradycardia. (29 Jul 2021 18:54)    REVIEW OF SYSTEMS:  Constitutional:  no fevers, chills, or new weakness, no recent exposure to ill contac  Eyes: +during dizzy spells patient does endorse pressure behind right eye, No double vision, no change in visual acuity, no blurry vision, no occular discharge  Neurologic: +during presyncopal episodes complains of tingling down right elbow to right hand, No new weakness, no seizure reported, no history of strokes, headaches well controlled with PRN medications  Ears, nose, mouth throat:  No ottorrhea. No change in hearing, No anosmia, No oral lesions, No sore throat  Cardiovascular: No palpitations, no chest pain, no nocturnal or positional dyspnea.  Respiratory: No shortness of breath, No Cough  Gastrointestinal: No change in bowel habits, no change in appetite, no nausea, no vomiting, no diarrhea  Genitourinary: No Frequency, No Dysuria, no Hematuria  Musculoskeletal: No muscle wasting, No new weakness  Psych: No changes in mood, Denies drug use, Denies history of psychiatric illness  Integumentary: Denies new skin lesions  Endocrine: Denies history of DM, denies history of thyroid disease, No heat or cold intolerance  Heme/Lymph: No use of antiplatelet/anticoagulant  Allergic / Immune: No active allergies unless otherwise specified in medical chart    All other systems reviewed and are negative    INTERVAL HPI/OVERNIGHT EVENTS:  Patient was laying flat in bed. After my physical examination, patient said "I feel an episode coming on. Patient became flushed and complained of feeling very dizzy, pressure behind right eye and numbness in right arm radiating down to his finger tips. Tele monitor at that moment revealed a Sinus bradycardia with a rate in the 20's. Systolic BP was 114/62. Within a few seconds, HR resolved and patient's symptoms dissipated. Dr. Montero present on the unit at this time. Spoke with Ailyn from cardiology.     Vital Signs Last 24 Hrs  T(C): 36.6 (30 Jul 2021 05:51), Max: 37 (29 Jul 2021 14:52)  T(F): 97.8 (30 Jul 2021 05:51), Max: 98.6 (29 Jul 2021 14:52)  HR: 52 (30 Jul 2021 10:06) (27 - 63)  BP: 169/73 (30 Jul 2021 10:06) (110/61 - 169/73)  BP(mean): --  RR: 17 (30 Jul 2021 05:51) (17 - 18)  SpO2: 100% (30 Jul 2021 05:51) (96% - 100%)    PHYSICAL EXAM:  GENERAL: NAD  HEAD:  Atraumatic, normocephalic  VIET COMA SCORE: E- V- M- =15       E: 4= opens eyes spontaneously       V: 5= oriented       M: 6= follows commands  MENTAL STATUS: AAO x3; Awake; Opens eyes spontaneously; Appropriately conversant without aphasia; following simple commands  CRANIAL NERVES: Visual acuity normal for age, visual fields full to confrontation, PERRL. EOMI without nystagmus. Facial sensation intact V1-3 distribution b/l. Face symmetric w/ normal eye closure and smile, tongue midline. Speech clear.   REFLEXES: PERRL.   MOTOR: strength 5/5 b/l upper and lower extremities  SENSATION: grossly intact to light touch all extremities  COORDINATION: Gait not assessed; rapid alternating movements intact; heel to shin intact; no upper extremity dysmetria    LABS:                        13.4   5.18  )-----------( 228      ( 30 Jul 2021 06:30 )             39.0     07-30    136  |  102  |  12.6  ----------------------------<  96  5.1   |  25.0  |  0.97    Ca    8.9      30 Jul 2021 06:30  Mg     1.6     07-30    TPro  7.1  /  Alb  4.1  /  TBili  1.1  /  DBili  x   /  AST  41<H>  /  ALT  27  /  AlkPhos  145<H>  07-29    PT/INR - ( 29 Jul 2021 09:41 )   PT: 13.1 sec;   INR: 1.14 ratio         PTT - ( 29 Jul 2021 09:41 )  PTT:27.9 sec      07-29 @ 07:01 - 07-30 @ 07:00  --------------------------------------------------------  IN: 100 mL / OUT: 0 mL / NET: 100 mL    07-30 @ 07:01 - 07-30 @ 11:14  --------------------------------------------------------  IN: 300 mL / OUT: 0 mL / NET: 300 mL        RADIOLOGY & ADDITIONAL TESTS:  < from: CT Angio Neck w/ IV Cont (07.29.21 @ 09:57) >     EXAM:  CT PERFUSION W MAPS IC                         EXAM:  CT ANGIO BRAIN (W)AW IC                         EXAM:  CT BRAIN STROKE PROTOCOL                         EXAM:  CT ANGIO NECK (W)AW IC                          PROCEDURE DATE:  07/29/2021         INTERPRETATION:  CLINICAL HISTORY: Stroke Code. . . Vertigo.    TECHNIQUE: Noncontrast CT head. RAPID artificial intelligence was used for preliminary evaluation of intracranial hemorrhage. After the administration of 50 cc Omnipaque 350, serial thin sections were obtained through the brain for the purposes of evaluating CT perfusion. Raw data was sent to the RAPID software for postprocessing. Contrast enhanced axial CT images were acquired from the aortic arch to the vertex of the calvarium, during the angiographic phase.  Three-dimensional maximum intensity projection reformats were generated.  70 ml of Omnipaque-350 mg/ml were administered intravenously, without immediate complication.    COMPARISON STUDY: CT head 7/27/2021    FINDINGS:    CT BRAIN WITHOUT CONTRAST:    There is no acute intracranial hemorrhage or mass effect. The ventricles and sulci are normal in size for patient's age.    There is no extraaxial fluid collection.    There is no displaced calvarial fracture. The visualized orbits are within normal limits. The visualized portions of the paranasal sinuses are well aerated. The mastoid air cells are well aerated.    CT PERFUSION:    CBF<30% volume: 0 ml  Tmax>6.0s volume: 0 ml  Mismatch volume: 0 ml  Mismatchratio: None    CT ANGIOGRAPHY NECK:    Thoracic aorta and branch vessels: Patent.  Right carotid system: Patent.  No hemodynamically significant stenosis using NASCET criteria.  No evidence of dissection.  Left carotid system: Patent.  No hemodynamically significant stenosis using NASCET criteria.  No evidence of dissection.  Vertebral arteries: Severe stenosis at the origin of the right vertebral artery. Patent dominant left vertebral artery.    Biapical scarring and blebs. Two solid right upperlobe nodules measuring up to 5 mm (7:513, 567).    CT ANGIOGRAPHY BRAIN:    Internal carotid arteries: Patent.  Anterior cerebral arteries: Patent.  Middle cerebral arteries: Patent.  Posterior cerebral arteries: Patent.  Vertebrobasilar: Patent dominant left vertebral artery. Occluded right vertebral artery V4 segment.    Vascular lesions: No evidence of intracranial aneurysm or large vascular malformation, within limits of CT technique.      IMPRESSION:    Brain CT without contrast:  No evidence of intracranial hemorrhage or mass effect. If clinical suspicion for acute infarct persists, brain MRI can be obtained if there is no contraindication.    CT perfusion:  No perfusion abnormality.    CT angiography neck:  1.  No hemodynamically significant stenosis of the bilateral cervical ICAs using NASCET criteria.  2.  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  3.  Two solid right upper lobe nodules measuring up to 5 mm. Consider nonemergent CT chest.    CT angiography brain: Occluded right vertebral artery V4 segment.    CT head and CT perfusion findings were discussed with Dr. MAX HADLEY [id number] at 7/29/2021 9:50 AM by Dr. Elroy Richter with read back confirmation.  Finding of right vertebral artery occlusion was discussed with Dr. Charles at 7/29/2021 10:15 AM by Dr. Elroy Richter with read back confirmation.    --- End of Report ---    < end of copied text >          CAPRINI SCORE [CLOT]:  Patient has an estimated Caprini score of greater than 5.  However, the patient's unique clinical situation will be addressed in an individual manner to determine appropriate anticoagulation treatment, if any.

## 2021-07-30 NOTE — PROGRESS NOTE ADULT - ASSESSMENT
Assessment:   Pt is a 61 yo male with alcohol and tobacco use disorder who presented with episodes of lightheadedness and syncope. Transient episode of hgih degree AV block seen on EKG and ~3 second pause noted on telemetry. Orthostatic positive with inappropriate HR response to drop in blood pressure (cardioinhibitory).    Continue with telemetry monitoring over the weekend.     Recommendations:   -Cont. Telemetry monitoring   -Obtain exercise stress test  -Encourage PO hydration   -Depending on telemetry findings over the weekend, pt may need a pacemaker with rate drop response.

## 2021-07-30 NOTE — PROGRESS NOTE ADULT - SUBJECTIVE AND OBJECTIVE BOX
S: Pt is a 60 year male patient with alcohol and tobacco use disorder who was admitted for syncope work up. Electrophysiology following for episodes of bradycardia and suspected heart block.   Pt seen and examined at the bedside this morning. Pt reports 2 episodes of lightheadedness dizziness and diaphoresis at 4:45am and 6:30 am this morning. Associated with posterior headache. Each episode lasting 1< minute. Occuring at rest.  No syncope. No chest pain, palpitations, dyspnea.        REVIEW OF SYSTEMS  General: - fever or chills, +dizziness, lighteadedness	  Skin/Breast: - rashes  ENMT: - sore throat  Respiratory and Thorax: - dyspnea or cough  Cardiovascular: - chest pain. - papitations  Gastrointestinal:	- N/V/D/C  Genitourinary: - dysuria  Musculoskeletal:	 - arthritis  Psychiatric: - anxiety     Allergies  No Known Allergies    SOCIAL HISTORY: current smoker: 1ppd x 35 years, 6 beers daily    Vital Signs Last 24 Hrs  T(C): 37.1 (30 Jul 2021 11:00), Max: 37.1 (30 Jul 2021 11:00)  T(F): 98.8 (30 Jul 2021 11:00), Max: 98.8 (30 Jul 2021 11:00)  HR: 56 (30 Jul 2021 11:00) (27 - 63)  BP: 162/86 (30 Jul 2021 11:00) (110/61 - 169/73)  BP(mean): 112 (30 Jul 2021 11:00) (112 - 112)  RR: 17 (30 Jul 2021 11:00) (17 - 18)  SpO2: 98% (30 Jul 2021 11:00) (96% - 100%)    Physical Exam:  Constitutional: AAOx3, NAD, thin appearing  Neck: supple, No JVD  Cardiovascular: +S1S2 RRR, no murmurs, rubs, gallops   Pulmonary: CTA b/l, unlabored, no wheezes, rales. No rhonchi  Abdomen: +BS, soft NTND  Extremities: no LE edema b/l   Neuro: AAOx3. No FND  Psych: no anxiety    Labs:              13.4   5.18  )-----------( 228      ( 30 Jul 2021 06:30 )             39.0   07-30    136  |  102  |  12.6  ----------------------------<  96  5.1   |  25.0  |  0.97    Ca    8.9      30 Jul 2021 06:30  Mg     1.6     07-30    TPro  7.1  /  Alb  4.1  /  TBili  1.1  /  DBili  x   /  AST  41<H>  /  ALT  27  /  AlkPhos  145<H>  07-29    Trop: Neg x3     RADIOLOGY & ADDITIONAL STUDIES:    MRI BRAIN                        FINDINGS:  Few punctate T2 prolongation signal normalities in the subcortical white matter which may be related to minimal chronic microvascular ischemic changes.    There is no acute parenchymal hemorrhage, parenchymal mass, mass effect or midline shift. There is no extra-axial fluid collection.  There is no hydrocephalus.  There is no acute infarct.    Mucosal thickening paranasal sinuses. Small retention cyst/polyp right maxillary sinus    IMPRESSION:  No acute intracranial hemorrhage or acute infarct.      EKG (7/27/2021): Sinus rhythm, VR: 77 bpm.

## 2021-07-31 DIAGNOSIS — R00.1 BRADYCARDIA, UNSPECIFIED: ICD-10-CM

## 2021-07-31 DIAGNOSIS — I25.10 ATHEROSCLEROTIC HEART DISEASE OF NATIVE CORONARY ARTERY WITHOUT ANGINA PECTORIS: ICD-10-CM

## 2021-07-31 DIAGNOSIS — Z02.9 ENCOUNTER FOR ADMINISTRATIVE EXAMINATIONS, UNSPECIFIED: ICD-10-CM

## 2021-07-31 DIAGNOSIS — R55 SYNCOPE AND COLLAPSE: ICD-10-CM

## 2021-07-31 LAB
ALBUMIN SERPL ELPH-MCNC: 3.2 G/DL — LOW (ref 3.3–5.2)
ALP SERPL-CCNC: 121 U/L — HIGH (ref 40–120)
ALT FLD-CCNC: 19 U/L — SIGNIFICANT CHANGE UP
ANION GAP SERPL CALC-SCNC: 9 MMOL/L — SIGNIFICANT CHANGE UP (ref 5–17)
AST SERPL-CCNC: 28 U/L — SIGNIFICANT CHANGE UP
BASOPHILS # BLD AUTO: 0.05 K/UL — SIGNIFICANT CHANGE UP (ref 0–0.2)
BASOPHILS NFR BLD AUTO: 0.8 % — SIGNIFICANT CHANGE UP (ref 0–2)
BILIRUB SERPL-MCNC: 0.4 MG/DL — SIGNIFICANT CHANGE UP (ref 0.4–2)
BUN SERPL-MCNC: 13.4 MG/DL — SIGNIFICANT CHANGE UP (ref 8–20)
CALCIUM SERPL-MCNC: 8.7 MG/DL — SIGNIFICANT CHANGE UP (ref 8.6–10.2)
CHLORIDE SERPL-SCNC: 104 MMOL/L — SIGNIFICANT CHANGE UP (ref 98–107)
CO2 SERPL-SCNC: 25 MMOL/L — SIGNIFICANT CHANGE UP (ref 22–29)
CREAT SERPL-MCNC: 0.82 MG/DL — SIGNIFICANT CHANGE UP (ref 0.5–1.3)
EOSINOPHIL # BLD AUTO: 0.19 K/UL — SIGNIFICANT CHANGE UP (ref 0–0.5)
EOSINOPHIL NFR BLD AUTO: 3 % — SIGNIFICANT CHANGE UP (ref 0–6)
GLUCOSE SERPL-MCNC: 114 MG/DL — HIGH (ref 70–99)
HCT VFR BLD CALC: 37.5 % — LOW (ref 39–50)
HGB BLD-MCNC: 12.8 G/DL — LOW (ref 13–17)
IMM GRANULOCYTES NFR BLD AUTO: 0.2 % — SIGNIFICANT CHANGE UP (ref 0–1.5)
LYMPHOCYTES # BLD AUTO: 1.53 K/UL — SIGNIFICANT CHANGE UP (ref 1–3.3)
LYMPHOCYTES # BLD AUTO: 24.1 % — SIGNIFICANT CHANGE UP (ref 13–44)
MCHC RBC-ENTMCNC: 34.1 GM/DL — SIGNIFICANT CHANGE UP (ref 32–36)
MCHC RBC-ENTMCNC: 37.8 PG — HIGH (ref 27–34)
MCV RBC AUTO: 110.6 FL — HIGH (ref 80–100)
MONOCYTES # BLD AUTO: 0.71 K/UL — SIGNIFICANT CHANGE UP (ref 0–0.9)
MONOCYTES NFR BLD AUTO: 11.2 % — SIGNIFICANT CHANGE UP (ref 2–14)
NEUTROPHILS # BLD AUTO: 3.86 K/UL — SIGNIFICANT CHANGE UP (ref 1.8–7.4)
NEUTROPHILS NFR BLD AUTO: 60.7 % — SIGNIFICANT CHANGE UP (ref 43–77)
PLATELET # BLD AUTO: 227 K/UL — SIGNIFICANT CHANGE UP (ref 150–400)
POTASSIUM SERPL-MCNC: 4.8 MMOL/L — SIGNIFICANT CHANGE UP (ref 3.5–5.3)
POTASSIUM SERPL-SCNC: 4.8 MMOL/L — SIGNIFICANT CHANGE UP (ref 3.5–5.3)
PROT SERPL-MCNC: 5.6 G/DL — LOW (ref 6.6–8.7)
RBC # BLD: 3.39 M/UL — LOW (ref 4.2–5.8)
RBC # FLD: 14.3 % — SIGNIFICANT CHANGE UP (ref 10.3–14.5)
SODIUM SERPL-SCNC: 138 MMOL/L — SIGNIFICANT CHANGE UP (ref 135–145)
WBC # BLD: 6.35 K/UL — SIGNIFICANT CHANGE UP (ref 3.8–10.5)
WBC # FLD AUTO: 6.35 K/UL — SIGNIFICANT CHANGE UP (ref 3.8–10.5)

## 2021-07-31 PROCEDURE — 99232 SBSQ HOSP IP/OBS MODERATE 35: CPT

## 2021-07-31 PROCEDURE — 93010 ELECTROCARDIOGRAM REPORT: CPT

## 2021-07-31 PROCEDURE — 99233 SBSQ HOSP IP/OBS HIGH 50: CPT

## 2021-07-31 RX ADMIN — ENOXAPARIN SODIUM 40 MILLIGRAM(S): 100 INJECTION SUBCUTANEOUS at 12:00

## 2021-07-31 RX ADMIN — CLOPIDOGREL BISULFATE 75 MILLIGRAM(S): 75 TABLET, FILM COATED ORAL at 12:00

## 2021-07-31 RX ADMIN — ATORVASTATIN CALCIUM 80 MILLIGRAM(S): 80 TABLET, FILM COATED ORAL at 21:28

## 2021-07-31 NOTE — PROGRESS NOTE ADULT - ASSESSMENT
59 y/o M, active smoker, no significant medical hx admitted for syncope and bradycardia with +CCTA; now s/p cardiac catheterization with intervention and CHETAN placement x2 to the right coronary artery

## 2021-07-31 NOTE — PROGRESS NOTE ADULT - SUBJECTIVE AND OBJECTIVE BOX
Pt feel well this morning POD #1 s/p CHETAN to RCA x 2.  He denies chest pain, SOB, palpitation or dizziness this am.  He has not ambulated yet    ECG: sinus bradycardia w/ intact conduction 56bpm, nml axis/intervals   TELE: sinus bradycardia overnight 45-50bpm, currently 60bpm. No evidence of heart block     MEDICATIONS  (STANDING):  aspirin  chewable 81 milliGRAM(s) Oral once  atorvastatin 80 milliGRAM(s) Oral at bedtime  atropine Injectable 0.5 milliGRAM(s) IV Push once  clopidogrel Tablet 75 milliGRAM(s) Oral once  clopidogrel Tablet 75 milliGRAM(s) Oral daily  enoxaparin Injectable 40 milliGRAM(s) SubCutaneous daily  lactated ringers. 1000 milliLiter(s) (100 mL/Hr) IV Continuous <Continuous>    MEDICATIONS  (PRN):  acetaminophen   Tablet .. 650 milliGRAM(s) Oral every 6 hours PRN Temp greater or equal to 38.5C (101.3F), Mild Pain (1 - 3)  aluminum hydroxide/magnesium hydroxide/simethicone Suspension 30 milliLiter(s) Oral every 4 hours PRN Dyspepsia  atropine Injectable 0.5 milliGRAM(s) IV Push once PRN Symptomatic bradycadia  ondansetron Injectable 4 milliGRAM(s) IV Push every 8 hours PRN Nausea and/or Vomiting      PAST MEDICAL & SURGICAL HISTORY:  No pertinent past medical history  History of arthroscopic knee surgery        Vital Signs Last 24 Hrs  T(C): 36.8 (31 Jul 2021 08:00), Max: 37.1 (30 Jul 2021 11:00)  T(F): 98.2 (31 Jul 2021 08:00), Max: 98.8 (30 Jul 2021 11:00)  HR: 64 (31 Jul 2021 08:00) (27 - 66)  BP: 140/80 (31 Jul 2021 08:00) (102/57 - 177/83)  BP(mean): 91 (30 Jul 2021 20:15) (91 - 112)  RR: 18 (31 Jul 2021 08:00) (17 - 18)  SpO2: 97% (31 Jul 2021 08:00) (96% - 100%)    Physical Exam:  Constitutional: NAD, AAOx3  Cardiovascular: +S1S2 RRR, no murmur  Pulmonary: CTA b/l, unlabored  GI: soft NTND +BS  Extremities: no pedal edema, +distal pulses b/l  Neuro: non focal, PEARL x4  right radial site wnl, no bleeding, swelling or hematoma     LABS:                        12.8   6.35  )-----------( 227      ( 31 Jul 2021 06:10 )             37.5     07-31    138  |  104  |  13.4  ----------------------------<  114<H>  4.8   |  25.0  |  0.82    Ca    8.7      31 Jul 2021 06:11  Mg     1.6     07-30    TPro  5.6<L>  /  Alb  3.2<L>  /  TBili  0.4  /  DBili  x   /  AST  28  /  ALT  19  /  AlkPhos  121<H>  07-31    PT/INR - ( 29 Jul 2021 09:41 )   PT: 13.1 sec;   INR: 1.14 ratio      Thyroid Stimulating Hormone, Serum (07.29.21 @ 12:46)    Thyroid Stimulating Hormone, Serum: 1.71 uIU/mL      RADIOLOGY & ADDITIONAL TESTS:  < from: TTE Echo Complete w/o Contrast w/ Doppler (07.29.21 @ 13:36) >   1. Normal left atrial size.   2. Color flow doppler and intravenous injection of agitated saline demonstrates the presence of an intact intra atrial septum.   3. Normal wall motion. Left ventricular ejection fraction, by visual estimation, is 65 to 70%. Grade I diastolic dysfunction.   4. Normal right atrial size.   5. Normal right ventricular size and function.   6. No significant valvular abnormality.   7. There is no evidence of pericardial effusion.  < end of copied text >    Cardiac Cath 7/30/21:  Right radial access +hemoband; no access complications  mRCA 80% -->resolute kervin 3.0 x24mm  dRCA 70% --> resolute kervin 2.74 x20mm  IVUS used with intervention  pLAD moderate disease 40-50% iFR 0.95  Cx-OM 50% iFR 0.98    A/P:  60 year male current smoker, with no known medical history admitted 7/29/21 with multiple episodes of near syncope and syncope. Orthostatics positive at time of presentation. Telemetry revealed episodes of sinus bradycardia with one dropped P wave. Episodes of dizziness occur without obvious triggers and even occur at rest in seated position with heart rates in the 70s. Neurology following pt with plan for EEG, but feel it is unlikely that his dizziness and syncope are caused by a neurological etiology. Abnormal CTA with significant CAD now POD #1 s/p LHC via RRA > s/p CHETAN x 2 to RCA. EP following for bradycardia and near syncope/syncope.     1. near syncope/syncope/dizziness  - improved s/p LHC as above with CHETAN to RCA x 2. Pt reports significant improvement in dizziness since cath  - unclear if related to sinus bradycardia, plan for progressive ambulation and assess for chronotropic competence   - Regardless of the cath results, would suggest tele monitoring at least till Monday 8/2. If pacing deemed unnecessary, then would consider MCOT or ILR for further monitoring.   - EF normal, no significant valvular disease     2. bradycardia   - Recurrent episodes of dizziness and one syncope. Most (except one that was postural with minimal drop in his BP and HR of 77 bpm) of his dizziness episodes while in hospital c/w unprovoked sinus bradycardia (down to the high 20s) and sinus pauses (< 3.5 seconds).   - f/u Lyme serology  - Regardless of the cath results, would suggest tele monitoring at least till Monday 8/2. If pacing deemed unnecessary, then would consider MCOT or ILR for further monitoring.   - TSH nml  - Neurology f/u appreciated      3. CAD   - s/p LHC: mRCA 80% -->resolute kervin 3.0 x24mm, dRCA 70% --> resolute kervin 2.74 x20mm, IVUS used with intervention, pLAD moderate disease 40-50% iFR 0.95, Cx-OM 50% iFR 0.98                                                         - continue asa/plavvix/statin  - smoking and alcohol cessation encouraged     will follow  RAYA Correa         Pt feel well this morning POD #1 s/p CHETAN to RCA x 2.  He denies chest pain, SOB, palpitation or dizziness this am.  He has not ambulated yet    ECG: sinus bradycardia w/ intact conduction 56bpm, nml axis/intervals   TELE: sinus bradycardia overnight 45-50bpm, currently 60bpm. No evidence of heart block     MEDICATIONS  (STANDING):  aspirin  chewable 81 milliGRAM(s) Oral once  atorvastatin 80 milliGRAM(s) Oral at bedtime  atropine Injectable 0.5 milliGRAM(s) IV Push once  clopidogrel Tablet 75 milliGRAM(s) Oral once  clopidogrel Tablet 75 milliGRAM(s) Oral daily  enoxaparin Injectable 40 milliGRAM(s) SubCutaneous daily  lactated ringers. 1000 milliLiter(s) (100 mL/Hr) IV Continuous <Continuous>    MEDICATIONS  (PRN):  acetaminophen   Tablet .. 650 milliGRAM(s) Oral every 6 hours PRN Temp greater or equal to 38.5C (101.3F), Mild Pain (1 - 3)  aluminum hydroxide/magnesium hydroxide/simethicone Suspension 30 milliLiter(s) Oral every 4 hours PRN Dyspepsia  atropine Injectable 0.5 milliGRAM(s) IV Push once PRN Symptomatic bradycadia  ondansetron Injectable 4 milliGRAM(s) IV Push every 8 hours PRN Nausea and/or Vomiting      PAST MEDICAL & SURGICAL HISTORY:  No pertinent past medical history  History of arthroscopic knee surgery        Vital Signs Last 24 Hrs  T(C): 36.8 (31 Jul 2021 08:00), Max: 37.1 (30 Jul 2021 11:00)  T(F): 98.2 (31 Jul 2021 08:00), Max: 98.8 (30 Jul 2021 11:00)  HR: 64 (31 Jul 2021 08:00) (27 - 66)  BP: 140/80 (31 Jul 2021 08:00) (102/57 - 177/83)  BP(mean): 91 (30 Jul 2021 20:15) (91 - 112)  RR: 18 (31 Jul 2021 08:00) (17 - 18)  SpO2: 97% (31 Jul 2021 08:00) (96% - 100%)    Physical Exam:  Constitutional: NAD, AAOx3  Cardiovascular: +S1S2 RRR, no murmur  Pulmonary: CTA b/l, unlabored  GI: soft NTND +BS  Extremities: no pedal edema, +distal pulses b/l  Neuro: non focal, PEARL x4  right radial site wnl, no bleeding, swelling or hematoma     LABS:                        12.8   6.35  )-----------( 227      ( 31 Jul 2021 06:10 )             37.5     07-31    138  |  104  |  13.4  ----------------------------<  114<H>  4.8   |  25.0  |  0.82    Ca    8.7      31 Jul 2021 06:11  Mg     1.6     07-30    TPro  5.6<L>  /  Alb  3.2<L>  /  TBili  0.4  /  DBili  x   /  AST  28  /  ALT  19  /  AlkPhos  121<H>  07-31    PT/INR - ( 29 Jul 2021 09:41 )   PT: 13.1 sec;   INR: 1.14 ratio      Thyroid Stimulating Hormone, Serum (07.29.21 @ 12:46)    Thyroid Stimulating Hormone, Serum: 1.71 uIU/mL      RADIOLOGY & ADDITIONAL TESTS:  < from: TTE Echo Complete w/o Contrast w/ Doppler (07.29.21 @ 13:36) >   1. Normal left atrial size.   2. Color flow doppler and intravenous injection of agitated saline demonstrates the presence of an intact intra atrial septum.   3. Normal wall motion. Left ventricular ejection fraction, by visual estimation, is 65 to 70%. Grade I diastolic dysfunction.   4. Normal right atrial size.   5. Normal right ventricular size and function.   6. No significant valvular abnormality.   7. There is no evidence of pericardial effusion.  < end of copied text >    Cardiac Cath 7/30/21:  Right radial access +hemoband; no access complications  mRCA 80% -->resolute kervin 3.0 x24mm  dRCA 70% --> resolute kervin 2.74 x20mm  IVUS used with intervention  pLAD moderate disease 40-50% iFR 0.95  Cx-OM 50% iFR 0.98    A/P:  60 year male current smoker, with no known medical history admitted 7/29/21 with multiple episodes of near syncope and syncope. Orthostatics positive at time of presentation. Telemetry revealed episodes of sinus bradycardia with one dropped P wave. Episodes of dizziness occur without obvious triggers and even occur at rest in seated position with heart rates in the 70s. Neurology following pt with plan for EEG, but feel it is unlikely that his dizziness and syncope are caused by a neurological etiology. Abnormal CTA with significant CAD now POD #1 s/p LHC via RRA > s/p CHETAN x 2 to RCA. EP following for bradycardia and near syncope/syncope.     1. near syncope/syncope/dizziness  - improved s/p LHC as above with CHETAN to RCA x 2. Pt reports significant improvement in dizziness since cath  - unclear if related to sinus bradycardia, plan for progressive ambulation and assess for chronotropic competence   - Regardless of the cath results, would suggest tele monitoring at least till Monday 8/2. If pacing deemed unnecessary, then would consider MCOT or ILR for further monitoring.   - EF normal, no significant valvular disease     2. bradycardia   - Recurrent episodes of dizziness and one syncope. Most (except one that was postural with minimal drop in his BP and HR of 77 bpm) of his dizziness episodes while in hospital c/w unprovoked sinus bradycardia (down to the high 20s) and sinus pauses (< 3.5 seconds).   - f/u Lyme serology  - Regardless of the cath results, would suggest tele monitoring at least till Monday 8/2. If pacing deemed unnecessary, then would consider MCOT or ILR for further monitoring.   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 Dominant left vert with no stenosis and supplies basilar artery. MRI negative for stroke. Likely vertebral artery not cause of patient's dizziness      3. CAD   - s/p LHC: mRCA 80% -->resolute kervin 3.0 x24mm, dRCA 70% --> resolute kervin 2.74 x20mm, IVUS used with intervention, pLAD moderate disease 40-50% iFR 0.95, Cx-OM 50% iFR 0.98                                                         - continue asa/plavvix/statin  - smoking and alcohol cessation encouraged     will follow  RAYA Correa

## 2021-07-31 NOTE — PROGRESS NOTE ADULT - SUBJECTIVE AND OBJECTIVE BOX
Department of Cardiology                                                                  [facility]/[place] [address]                                                            Telephone: [phone]. Fax:[phone]  Cardiology Progress Note  Follow up: POD 1 Left heart cardiac catheterization via right radial artery approach  PCI and CHETAN placement x2 to right coronary artery  Pt endorses feeling "much better"  tele remains sinus bradycardia-sinus rhythm (with ambulation); no evidence of heart block  He denies chest pain, SOB/ARREOLA, palps, dizziness, lightheadedness, pre syncope/syncope, numbness/weakness in extremities    	  MEDICATIONS:  acetaminophen   Tablet .. 650 milliGRAM(s) Oral every 6 hours PRN  ondansetron Injectable 4 milliGRAM(s) IV Push every 8 hours PRN  aluminum hydroxide/magnesium hydroxide/simethicone Suspension 30 milliLiter(s) Oral every 4 hours PRN  atropine Injectable 0.5 milliGRAM(s) IV Push once  atropine Injectable 0.5 milliGRAM(s) IV Push once PRN  atorvastatin 80 milliGRAM(s) Oral at bedtime  aspirin  chewable 81 milliGRAM(s) Oral once  clopidogrel Tablet 75 milliGRAM(s) Oral once  clopidogrel Tablet 75 milliGRAM(s) Oral daily  enoxaparin Injectable 40 milliGRAM(s) SubCutaneous daily  lactated ringers. 1000 milliLiter(s) IV Continuous <Continuous>    PHYSICAL EXAM:  T(C): 36.8 (21 @ 08:00), Max: 36.8 (21 @ 08:00)  HR: 64 (21 @ 08:00) (48 - 66)  BP: 140/80 (21 @ 08:00) (102/57 - 177/83)  RR: 18 (21 @ 08:00) (17 - 18)  SpO2: 97% (21 @ 08:00) (96% - 100%)  Wt(kg): --    I&O's Summary    2021 07:01  -  2021 07:00  --------------------------------------------------------  IN: 1960 mL / OUT: 1190 mL / NET: 770 mL    Daily Weight in k.2 (2021 04:00)    Appearance: Normal	  HEENT:   Normal oral mucosa, PERRL, EOMI	  Lymphatic: No lymphadenopathy  Cardiovascular: Normal S1 S2, No JVD, No murmurs, No edema  Respiratory: Lungs clear to auscultation	  Psychiatry: A & O x 3, Mood & affect appropriate  Gastrointestinal:  Soft, Non-tender, + BS	  Skin: No rashes, No ecchymoses, No cyanosis  Neurologic: Non-focal  Extremities: Normal range of motion, No clubbing, cyanosis or edema  Vascular: Peripheral pulses palpable 2+ bilaterally  Right radial artery access    TELEMETRY: sinus anamika 40-50s-->sinus rhythm 60s with ambulation      DIAGNOSTIC TESTING:  [ ] Echocardiogram:  < from: TTE Echo Complete w/o Contrast w/ Doppler (21 @ 13:36) >  PHYSICIAN INTERPRETATION:  Left Ventricle: The left ventricular internal cavity size is normal.  Global LV systolic function was normal. Left ventricular ejection fraction, by visual estimation, is 50 to 55%. Spectral Doppler shows impaired relaxation pattern of left ventricular myocardial filling (Grade I diastolic dysfunction).  Right Ventricle: Normal right ventricular size and function. The right ventricular size is normal. RV systolic function is normal.  Left Atrium: Normal left atrial size. Color flow doppler and intravenous injection of agitated saline demonstrates the presence of an intact intra atrial septum.  Right Atrium: Normal right atrial size.  Pericardium: There is no evidence of pericardial effusion.  Mitral Valve: The mitral valve is normal in structure. Trace mitral valve regurgitation is seen.  Tricuspid Valve: The tricuspid valve is normal in structure. Trivial tricuspid regurgitation is visualized.  Aortic Valve: The aortic valve is trileaflet. No evidence of aortic valve regurgitation is seen.  Pulmonic Valve: Structurally normal pulmonic valve, with normal leaflet excursion. Trace pulmonic valve regurgitation.  Aorta: The aortic root is normal in size and structure.  Pulmonary Artery: The main pulmonary artery is normal in size.  Venous: The inferior vena cava was normal sized, with respiratory size variation greater than 50%.  Shunts: Agitated saline contrast was given intravenously to evaluate for intracardiac shunting.      Summary:   1. Normal left atrial size.   2. Color flow doppler and intravenous injection of agitated saline demonstrates the presence of an intact intra atrial septum.   3. Normal wall motion. Left ventricular ejection fraction, by visual estimation, is 65 to 70%. Grade I diastolic dysfunction.   4. Normal right atrial size.   5. Normal right ventricular size and function.   6. No significant valvular abnormality.   7. There is no evidence of pericardial effusion.      [X]  Catheterization: official report is pending  mRCA 80% -->resolute kervin 3.0 x24mm  dRCA 70% --> resolute kervin 2.74 x20mm  IVUS used with intervention  pLAD moderate disease 40-50% iFR 0.95  Cx-OM 50% iFR 0.98    LABS:	 	                        12.8   6.35  )-----------( 227      ( 2021 06:10 )             37.5         138  |  104  |  13.4  ----------------------------<  114<H>  4.8   |  25.0  |  0.82    Ca    8.7      2021 06:11  Mg     1.6         TPro  5.6<L>  /  Alb  3.2<L>  /  TBili  0.4  /  DBili  x   /  AST  28  /  ALT  19  /  AlkPhos  121<H>

## 2021-07-31 NOTE — PROGRESS NOTE ADULT - ASSESSMENT
59 y/o M admitted for syncope and bradycardia with +CCTA; now s/p [facility] with intervention and CHETAN placement x2 to the right coronary artery.

## 2021-07-31 NOTE — PROGRESS NOTE ADULT - SUBJECTIVE AND OBJECTIVE BOX
[facility] Division of Hospital Medicine    Chief Complaint:  Syncope with Bradycardia    SUBJECTIVE / OVERNIGHT EVENTS: No acute events overnight. Denies chest pain, SOB, n/v/f/c/d. He endorses lightheadedness. Tele showed sinus bradyacrdia to 45-50 bpm. Patient had a C yesterday evening.     Patient denies chest pain, SOB, abd pain, N/V, fever, chills, dysuria or any other complaints. All remainder ROS negative.     MEDICATIONS  (STANDING):  aspirin  chewable 81 milliGRAM(s) Oral once  atorvastatin 80 milliGRAM(s) Oral at bedtime  atropine Injectable 0.5 milliGRAM(s) IV Push once  clopidogrel Tablet 75 milliGRAM(s) Oral once  clopidogrel Tablet 75 milliGRAM(s) Oral daily  enoxaparin Injectable 40 milliGRAM(s) SubCutaneous daily  lactated ringers. 1000 milliLiter(s) (100 mL/Hr) IV Continuous <Continuous>    MEDICATIONS  (PRN):  acetaminophen   Tablet .. 650 milliGRAM(s) Oral every 6 hours PRN Temp greater or equal to 38.5C (101.3F), Mild Pain (1 - 3)  aluminum hydroxide/magnesium hydroxide/simethicone Suspension 30 milliLiter(s) Oral every 4 hours PRN Dyspepsia  atropine Injectable 0.5 milliGRAM(s) IV Push once PRN Symptomatic bradycadia  ondansetron Injectable 4 milliGRAM(s) IV Push every 8 hours PRN Nausea and/or Vomiting        I&O's Summary    30 Jul 2021 07:01  -  31 Jul 2021 07:00  --------------------------------------------------------  IN: 1960 mL / OUT: 1190 mL / NET: 770 mL        PHYSICAL EXAM:  Vital Signs Last 24 Hrs  T(C): 36.8 (31 Jul 2021 08:00), Max: 36.8 (31 Jul 2021 08:00)  T(F): 98.2 (31 Jul 2021 08:00), Max: 98.2 (31 Jul 2021 08:00)  HR: 64 (31 Jul 2021 08:00) (48 - 66)  BP: 140/80 (31 Jul 2021 08:00) (102/57 - 177/83)  BP(mean): 91 (30 Jul 2021 20:15) (91 - 91)  RR: 18 (31 Jul 2021 08:00) (17 - 18)  SpO2: 97% (31 Jul 2021 08:00) (96% - 100%)        CONSTITUTIONAL: NAD, well-developed, well-groomed  ENMT: Moist oral mucosa, no pharyngeal injection or exudates; normal dentition  RESPIRATORY: Normal respiratory effort; lungs are clear to auscultation bilaterally  CARDIOVASCULAR: Regular rate and rhythm, normal S1 and S2, no murmur/rub/gallop; No lower extremity edema; Peripheral pulses are 2+ bilaterally  ABDOMEN: Nontender to palpation, normoactive bowel sounds, no rebound/guarding; No hepatosplenomegaly  MUSCLOSKELETAL:  Normal gait; no clubbing or cyanosis of digits; no joint swelling or tenderness to palpation. RT radial site c/d/i and non-tender  PSYCH: A+O to person, place, and time; affect appropriate  NEUROLOGY: CN 2-12 are intact and symmetric; no gross sensory deficits;   SKIN: No rashes; no palpable lesions    LABS:                        12.8   6.35  )-----------( 227      ( 31 Jul 2021 06:10 )             37.5     07-31    138  |  104  |  13.4  ----------------------------<  114<H>  4.8   |  25.0  |  0.82    Ca    8.7      31 Jul 2021 06:11  Mg     1.6     07-30    TPro  5.6<L>  /  Alb  3.2<L>  /  TBili  0.4  /  DBili  x   /  AST  28  /  ALT  19  /  AlkPhos  121<H>  07-31      CARDIAC MARKERS ( 30 Jul 2021 06:30 )  x     / <0.01 ng/mL / x     / x     / x      CARDIAC MARKERS ( 29 Jul 2021 15:55 )  x     / <0.01 ng/mL / x     / x     / x      CARDIAC MARKERS ( 29 Jul 2021 12:46 )  x     / <0.01 ng/mL / x     / x     / x              CAPILLARY BLOOD GLUCOSE            RADIOLOGY & ADDITIONAL TESTS:  Results Reviewed:   Imaging Personally Reviewed:  Electrocardiogram Personally Reviewed:

## 2021-07-31 NOTE — PROGRESS NOTE ADULT - ATTENDING COMMENTS
pt seen and examined  Plan of care dw NP  Pt is s.p. pci to RCA.  Hr is better on monitor  EP input appreciated  We will follow with you.

## 2021-07-31 NOTE — PROGRESS NOTE ADULT - ATTENDING COMMENTS
It is likely that his sinus bradycardia has been in part related to inferior ischemia and likely to improve following PCI.   Will monitor on telemetry, and plan outpt monitoring upon discharge if no clear indication for ppm prior.

## 2021-07-31 NOTE — PROGRESS NOTE ADULT - ASSESSMENT
60y Male who is followed by neurology because of recurrent syncope, right arm paresthesia and vertebral artery occlusion    Right vertebral artery occlusion  MRI negative for stroke.    Syncope  Feels rapid heart rate, sweats and syncope/near syncope.  Several episodes since Saturday.  No need for EEG.     No further specific neurologic recommendations. Will be available as needed.     Case discussed with Dr Garcia yesterday and with Dr Domínguez today.

## 2021-07-31 NOTE — PROGRESS NOTE ADULT - PROBLEM SELECTOR PLAN 4
- Diet- DASH  - Dispo- Pending PT recs. Will benefit from cardiac rehab.     - Patient deferred updating family members.     Patient is actively acute, no plan for discharge at this time pending clinical course. Patient is at high risk at the moment due to tele monitoring till 8/1 and pending PT recs. - DVT ppx- Lovenox 40 mg QD  - Diet- DASH  - Dispo- Pending PT recs. Will benefit from cardiac rehab.     - Patient deferred updating family members.     Patient is actively acute, no plan for discharge at this time pending clinical course. Patient is at high risk at the moment due to tele monitoring till 8/1 and pending PT recs.

## 2021-07-31 NOTE — PROGRESS NOTE ADULT - SUBJECTIVE AND OBJECTIVE BOX
[facility]                                        Neu[facility] [place]                                 Bernabe Sanchez, & Alcides                                  [address]                                        [place], [place], [address]                                             [phone]        CC: Syncope    HPI:   The patient is a 60y Male who presented with several episodes of syncope/near syncope since Saturday.  He says that he feels dizzy and has rapid heart rate and sweats then he has tingling in right arm.  He then either passes out or almost does.  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    Interim history:  Now on 4 Tower.   He is status post cardiac cath.    ROS:   Denies headache or dizziness.  Denies chest pain.  Denies shortness of breath.    MEDICATIONS  (STANDING):  aspirin  chewable 81 milliGRAM(s) Oral once  atorvastatin 80 milliGRAM(s) Oral at bedtime  atropine Injectable 0.5 milliGRAM(s) IV Push once  clopidogrel Tablet 75 milliGRAM(s) Oral once  clopidogrel Tablet 75 milliGRAM(s) Oral daily  enoxaparin Injectable 40 milliGRAM(s) SubCutaneous daily  lactated ringers. 1000 milliLiter(s) (100 mL/Hr) IV Continuous <Continuous>      Vital Signs Last 24 Hrs  T(C): 36.8 (31 Jul 2021 08:00), Max: 36.8 (31 Jul 2021 08:00)  T(F): 98.2 (31 Jul 2021 08:00), Max: 98.2 (31 Jul 2021 08:00)  HR: 64 (31 Jul 2021 08:00) (48 - 66)  BP: 140/80 (31 Jul 2021 08:00) (102/57 - 177/83)  BP(mean): 91 (30 Jul 2021 20:15) (91 - 91)  RR: 18 (31 Jul 2021 08:00) (17 - 18)  SpO2: 97% (31 Jul 2021 08:00) (96% - 100%)    Detailed Neurologic Exam:    Mental status: The patient is awake and alert. There is no aphasia. There is no dysarthria.     Cranial nerves: Pupils equal and react symmetrically to light. There is no visual field deficit to threat. Extraocular motion is full with no nystagmus. Facial sensation is intact. Facial musculature is symmetric. Palate elevates symmetrically. Tongue is midline.    Motor: There is normal bulk and tone.  There is no tremor.  Strength grossly 5/5 bilaterally.    Sensation: Grossly intact to light touch and pin.    Reflexes: 2+ throughout and plantar responses are flexor.    Cerebellar: No dysmetria on finger nose testing.    Labs:     07-31    138  |  104  |  13.4  ----------------------------<  114<H>  4.8   |  25.0  |  0.82    Ca    8.7      31 Jul 2021 06:11  Mg     1.6     07-30    TPro  5.6<L>  /  Alb  3.2<L>  /  TBili  0.4  /  DBili  x   /  AST  28  /  ALT  19  /  AlkPhos  121<H>  07-31                            12.8   6.35  )-----------( 227      ( 31 Jul 2021 06:10 )             37.5

## 2021-08-01 LAB
ALBUMIN SERPL ELPH-MCNC: 3.5 G/DL — SIGNIFICANT CHANGE UP (ref 3.3–5.2)
ALP SERPL-CCNC: 130 U/L — HIGH (ref 40–120)
ALT FLD-CCNC: 16 U/L — SIGNIFICANT CHANGE UP
ANION GAP SERPL CALC-SCNC: 7 MMOL/L — SIGNIFICANT CHANGE UP (ref 5–17)
ANION GAP SERPL CALC-SCNC: 9 MMOL/L — SIGNIFICANT CHANGE UP (ref 5–17)
AST SERPL-CCNC: 26 U/L — SIGNIFICANT CHANGE UP
BILIRUB SERPL-MCNC: 0.4 MG/DL — SIGNIFICANT CHANGE UP (ref 0.4–2)
BLD GP AB SCN SERPL QL: SIGNIFICANT CHANGE UP
BUN SERPL-MCNC: 8.5 MG/DL — SIGNIFICANT CHANGE UP (ref 8–20)
BUN SERPL-MCNC: 9.8 MG/DL — SIGNIFICANT CHANGE UP (ref 8–20)
CALCIUM SERPL-MCNC: 9.1 MG/DL — SIGNIFICANT CHANGE UP (ref 8.6–10.2)
CALCIUM SERPL-MCNC: 9.4 MG/DL — SIGNIFICANT CHANGE UP (ref 8.6–10.2)
CHLORIDE SERPL-SCNC: 102 MMOL/L — SIGNIFICANT CHANGE UP (ref 98–107)
CHLORIDE SERPL-SCNC: 108 MMOL/L — HIGH (ref 98–107)
CO2 SERPL-SCNC: 25 MMOL/L — SIGNIFICANT CHANGE UP (ref 22–29)
CO2 SERPL-SCNC: 26 MMOL/L — SIGNIFICANT CHANGE UP (ref 22–29)
CREAT SERPL-MCNC: 0.69 MG/DL — SIGNIFICANT CHANGE UP (ref 0.5–1.3)
CREAT SERPL-MCNC: 0.81 MG/DL — SIGNIFICANT CHANGE UP (ref 0.5–1.3)
GLUCOSE BLDC GLUCOMTR-MCNC: 96 MG/DL — SIGNIFICANT CHANGE UP (ref 70–99)
GLUCOSE SERPL-MCNC: 81 MG/DL — SIGNIFICANT CHANGE UP (ref 70–99)
GLUCOSE SERPL-MCNC: 97 MG/DL — SIGNIFICANT CHANGE UP (ref 70–99)
HCT VFR BLD CALC: 38.2 % — LOW (ref 39–50)
HGB BLD-MCNC: 13.4 G/DL — SIGNIFICANT CHANGE UP (ref 13–17)
MAGNESIUM SERPL-MCNC: 1.8 MG/DL — SIGNIFICANT CHANGE UP (ref 1.8–2.6)
MCHC RBC-ENTMCNC: 35.1 GM/DL — SIGNIFICANT CHANGE UP (ref 32–36)
MCHC RBC-ENTMCNC: 38.7 PG — HIGH (ref 27–34)
MCV RBC AUTO: 110.4 FL — HIGH (ref 80–100)
PHOSPHATE SERPL-MCNC: 3.9 MG/DL — SIGNIFICANT CHANGE UP (ref 2.4–4.7)
PLATELET # BLD AUTO: 231 K/UL — SIGNIFICANT CHANGE UP (ref 150–400)
POTASSIUM SERPL-MCNC: 4.8 MMOL/L — SIGNIFICANT CHANGE UP (ref 3.5–5.3)
POTASSIUM SERPL-MCNC: 5.4 MMOL/L — HIGH (ref 3.5–5.3)
POTASSIUM SERPL-SCNC: 4.8 MMOL/L — SIGNIFICANT CHANGE UP (ref 3.5–5.3)
POTASSIUM SERPL-SCNC: 5.4 MMOL/L — HIGH (ref 3.5–5.3)
PROT SERPL-MCNC: 6.4 G/DL — LOW (ref 6.6–8.7)
RBC # BLD: 3.46 M/UL — LOW (ref 4.2–5.8)
RBC # FLD: 14.5 % — SIGNIFICANT CHANGE UP (ref 10.3–14.5)
SARS-COV-2 RNA SPEC QL NAA+PROBE: SIGNIFICANT CHANGE UP
SODIUM SERPL-SCNC: 137 MMOL/L — SIGNIFICANT CHANGE UP (ref 135–145)
SODIUM SERPL-SCNC: 140 MMOL/L — SIGNIFICANT CHANGE UP (ref 135–145)
WBC # BLD: 5.32 K/UL — SIGNIFICANT CHANGE UP (ref 3.8–10.5)
WBC # FLD AUTO: 5.32 K/UL — SIGNIFICANT CHANGE UP (ref 3.8–10.5)

## 2021-08-01 PROCEDURE — 99232 SBSQ HOSP IP/OBS MODERATE 35: CPT

## 2021-08-01 PROCEDURE — 99233 SBSQ HOSP IP/OBS HIGH 50: CPT

## 2021-08-01 RX ORDER — ASPIRIN/CALCIUM CARB/MAGNESIUM 324 MG
81 TABLET ORAL DAILY
Refills: 0 | Status: DISCONTINUED | OUTPATIENT
Start: 2021-08-01 | End: 2021-08-03

## 2021-08-01 RX ORDER — INSULIN HUMAN 100 [IU]/ML
5 INJECTION, SOLUTION SUBCUTANEOUS ONCE
Refills: 0 | Status: COMPLETED | OUTPATIENT
Start: 2021-08-01 | End: 2021-08-01

## 2021-08-01 RX ORDER — DEXTROSE 50 % IN WATER 50 %
25 SYRINGE (ML) INTRAVENOUS ONCE
Refills: 0 | Status: COMPLETED | OUTPATIENT
Start: 2021-08-01 | End: 2021-08-01

## 2021-08-01 RX ADMIN — Medication 25 MILLILITER(S): at 08:33

## 2021-08-01 RX ADMIN — Medication 81 MILLIGRAM(S): at 12:04

## 2021-08-01 RX ADMIN — CLOPIDOGREL BISULFATE 75 MILLIGRAM(S): 75 TABLET, FILM COATED ORAL at 12:04

## 2021-08-01 RX ADMIN — INSULIN HUMAN 5 UNIT(S): 100 INJECTION, SOLUTION SUBCUTANEOUS at 08:33

## 2021-08-01 NOTE — PROGRESS NOTE ADULT - ATTENDING COMMENTS
61 y/o M admitted for syncope and bradycardia with +CCTA; now s/p [facility] with intervention and CHETAN placement x2 to the right coronary artery. Patient continues to have symptomatic bradycardia.    Today morning, patient felt lightheaded, diaphoretic and had tingling sensation to his RT arm. Denies current chest pain and SOB. Tele notable for bradycardia to 40s and junctional rhythm.     Vital Signs Last 24 Hrs  T(C): 36.6 (01 Aug 2021 07:37), Max: 36.8 (31 Jul 2021 15:24)  T(F): 97.9 (01 Aug 2021 07:37), Max: 98.2 (31 Jul 2021 15:24)  HR: 54 (01 Aug 2021 07:37) (52 - 62)  BP: 149/80 (01 Aug 2021 07:37) (115/72 - 154/83)  BP(mean): 103 (01 Aug 2021 07:37) (95 - 103)  RR: 17 (01 Aug 2021 07:37) (14 - 18)  SpO2: 96% (01 Aug 2021 07:37) (96% - 100%)    CONSTITUTIONAL: NAD, well-developed, well-groomed  ENMT: Moist oral mucosa, no pharyngeal injection or exudates; normal dentition  RESPIRATORY: Normal respiratory effort; lungs are clear to auscultation bilaterally  CARDIOVASCULAR: Regular rate and rhythm, normal S1 and S2, no murmur/rub/gallop; No lower extremity edema; Peripheral pulses are 2+ bilaterally  ABDOMEN: Nontender to palpation, normoactive bowel sounds, no rebound/guarding; No hepatosplenomegaly  MUSCLOSKELETAL:  Normal gait; no clubbing or cyanosis of digits; no joint swelling or tenderness to palpation. RT radial site c/d/i and non-tender  PSYCH: A+O to person, place, and time; affect appropriate  NEUROLOGY: CN 2-12 are intact and symmetric; no gross sensory deficits;   SKIN: No rashes; no palpable lesions    Plan: Patient with recurrent symptomatic bradycardia  - Will keep patient NPO for possible PPM vs ILR on 8/2  - Discontinued lovenox in anticipation of PPM placement  - Will c/w ASA/Plavix and tele monitoring    - Patient deferred updating family members.    Patient is actively acute, no plan for discharge at this time pending clinical course. Patient is at high risk at the moment due to symptomatic bradycardia; pending PPM vs ILR placement. 59 y/o M admitted for syncope and bradycardia with +CCTA; now s/p C with intervention and CHETAN placement x2 to the right coronary artery. Patient continues to have symptomatic bradycardia.    Today morning, patient felt lightheaded, diaphoretic and had tingling sensation to his RT arm. Denies current chest pain and SOB. Tele notable for bradycardia to 40s and junctional rhythm.     Vital Signs Last 24 Hrs  T(C): 36.6 (01 Aug 2021 07:37), Max: 36.8 (31 Jul 2021 15:24)  T(F): 97.9 (01 Aug 2021 07:37), Max: 98.2 (31 Jul 2021 15:24)  HR: 54 (01 Aug 2021 07:37) (52 - 62)  BP: 149/80 (01 Aug 2021 07:37) (115/72 - 154/83)  BP(mean): 103 (01 Aug 2021 07:37) (95 - 103)  RR: 17 (01 Aug 2021 07:37) (14 - 18)  SpO2: 96% (01 Aug 2021 07:37) (96% - 100%)    CONSTITUTIONAL: NAD, well-developed, well-groomed  ENMT: Moist oral mucosa, no pharyngeal injection or exudates; normal dentition  RESPIRATORY: Normal respiratory effort; lungs are clear to auscultation bilaterally  CARDIOVASCULAR: Regular rate and rhythm, normal S1 and S2, no murmur/rub/gallop; No lower extremity edema; Peripheral pulses are 2+ bilaterally  ABDOMEN: Nontender to palpation, normoactive bowel sounds, no rebound/guarding; No hepatosplenomegaly  MUSCLOSKELETAL:  Normal gait; no clubbing or cyanosis of digits; no joint swelling or tenderness to palpation. RT radial site c/d/i and non-tender  PSYCH: A+O to person, place, and time; affect appropriate  NEUROLOGY: CN 2-12 are intact and symmetric; no gross sensory deficits;   SKIN: No rashes; no palpable lesions    Plan: Patient with recurrent symptomatic bradycardia  - Will keep patient NPO for possible PPM vs ILR on 8/2  - Discontinued lovenox in anticipation of PPM placement  - Will c/w ASA/Plavix and tele monitoring  - Pending PT recs    - Patient deferred updating family members.    Patient is actively acute, no plan for discharge at this time pending clinical course. Patient is at high risk at the moment due to symptomatic bradycardia; pending PPM vs ILR placement.

## 2021-08-01 NOTE — PROGRESS NOTE ADULT - ASSESSMENT
59 y/o M, active smoker, no significant medical hx admitted for syncope and bradycardia with +CCTA; now s/p cardiac catheterization with intervention and CHETAN placement x2 to the right coronary artery    CAD   -mRCA and dRCA with +intervention  - iFR was negative for Cx/OM despite CCTA  -Dual anti platelet therapy with aspirin/ plavix   -statin therapy; atorvastatin 80mg daily  -continue tele monitoring; post revascularization of RCA; increase activity and PO hydration  - Diet/lifestyle modifications and medication compliance heavily reinforced   -Tobacco cessation  -tele monitoring 72 hours post revascularization.     Bradycardia  - RCA territory ischemia s/p intervention   - now with sinus node dysfunction, symptomatic junctional rhythmn on telemetry    -TTE normal; no PFO, no valvular dysfxn, normal EF  -continue to hold AV renetta blockers at this time  - pt would benefit from beta blockers, will likely require PPM   - will discuss with EP and keep pt NPO @ [facility] for possible PPM tomorrow

## 2021-08-01 NOTE — PROGRESS NOTE ADULT - SUBJECTIVE AND OBJECTIVE BOX
[facility] Division of Hospital Medicine    Chief Complaint:  Syncope with Bradycardia    No events overnight  Early this am pt w/ single episode of symptomatic bradycardia (lightheaded and diaphroetic). Jordin down to 30s at this time.     Pt seen and examined at bedside  Currently no complaints  Mentions event earlier from this morning ^  Denies headache, dizziness, lightheadedness, chest pain, SOB   VSS on RA  ROS neg    MEDICATIONS  (STANDING):  aspirin  chewable 81 milliGRAM(s) Oral once  atorvastatin 80 milliGRAM(s) Oral at bedtime  atropine Injectable 0.5 milliGRAM(s) IV Push once  clopidogrel Tablet 75 milliGRAM(s) Oral once  clopidogrel Tablet 75 milliGRAM(s) Oral daily  enoxaparin Injectable 40 milliGRAM(s) SubCutaneous daily  lactated ringers. 1000 milliLiter(s) (100 mL/Hr) IV Continuous <Continuous>    MEDICATIONS  (PRN):  acetaminophen   Tablet .. 650 milliGRAM(s) Oral every 6 hours PRN Temp greater or equal to 38.5C (101.3F), Mild Pain (1 - 3)  aluminum hydroxide/magnesium hydroxide/simethicone Suspension 30 milliLiter(s) Oral every 4 hours PRN Dyspepsia  atropine Injectable 0.5 milliGRAM(s) IV Push once PRN Symptomatic bradycadia  ondansetron Injectable 4 milliGRAM(s) IV Push every 8 hours PRN Nausea and/or Vomiting    Vital Signs Last 24 Hrs  T(C): 36.6 (01 Aug 2021 07:37), Max: 36.8 (31 Jul 2021 15:24)  T(F): 97.9 (01 Aug 2021 07:37), Max: 98.2 (31 Jul 2021 15:24)  HR: 54 (01 Aug 2021 07:37) (52 - 62)  BP: 149/80 (01 Aug 2021 07:37) (115/72 - 154/83)  BP(mean): 103 (01 Aug 2021 07:37) (95 - 103)  RR: 17 (01 Aug 2021 07:37) (14 - 18)  SpO2: 96% (01 Aug 2021 07:37) (96% - 100%) on RA    PHYSICAL EXAM:  CONSTITUTIONAL: NAD   ENMT: Moist oral mucosa, no pharyngeal injection or exudates; normal dentition  RESPIRATORY: Normal respiratory effort; lungs are clear to auscultation bilaterally  CARDIOVASCULAR: Regular rate and rhythm, normal S1 and S2, no murmur/rub/gallop; No lower extremity edema; Peripheral pulses are 2+ bilaterally  ABDOMEN: Nontender to palpation, normoactive bowel sounds, no rebound/guarding; No hepatosplenomegaly  MUSCLOSKELETAL:  Normal gait; no clubbing or cyanosis of digits; no joint swelling or tenderness to palpation. RT radial site c/d/i and non-tender  PSYCH: A+O to person, place, and time; affect appropriate  NEUROLOGY: CN 2-12 are intact and symmetric; no gross sensory deficits;     LABS:                                   13.4   5.32  )-----------( 231      ( 01 Aug 2021 06:33 )             38.2   08-01    140  |  108<H>  |  9.8  ----------------------------<  97  5.4<H>   |  25.0  |  0.81    Ca    9.4      01 Aug 2021 06:33  Phos  3.9     08-01  Mg     1.8     08-01    TPro  6.4<L>  /  Alb  3.5  /  TBili  0.4  /  DBili  x   /  AST  26  /  ALT  16  /  AlkPhos  130<H>  08-01      RADIOLOGY & ADDITIONAL TESTS: REVIEWED

## 2021-08-01 NOTE — PROGRESS NOTE ADULT - ASSESSMENT
61 y/o M admitted for syncope and bradycardia with +CCTA; now s/p [facility] with intervention and CHETAN placement x2 to the right coronary artery.    1. Syncope  - 2/2 to bradycardia, underling ischemia now s/p LHC and CHETAN x 2  - Still with near syncopal symptoms/episodes  - MRI negative for stroke  - Seen by neuro, no need for EEG  - S/p LHC on 7/30, s/p CHETAN x 2 to RCA  - EP following, may need PPM, follow recs  - F/u tick borne panel  - F/u PT recs     2. CAD (coronary artery disease  - S/p C 7/30 wiht  CHETAN x2 to RCA   - C/w DAPT w/ ASA/Plavix, lipitor 80 mg  - Hold BB 2/2 bradycardia  - Tele monitoring 72 hours post revascularization.     3. Bradycardia  - Episode of symptomatic bradycardia this morning  - Tele reviewed, rhythm fluctuates between sinus anamika and junctional  - EP following.. per EP, c/w tele monitoring till 8/2 (Monday). If pacing is necessary, plan for ILR vs MCOT. However, after symptomatic episode of bradycardia this am, discussed with cardio.. pt may need PPM. Cardio to further discuss with EP. Follow plan  - Will hold BB  - TTE normal; no PFO, no valvular dysfxn, normal EF  - TSH WNL  - Atropine PRN for sx bradycardia. If persistent bradycardia, will consider ICU consult for TVP vs dopamine gtt.      DVT ppx- Lovenox 40 mg QD   Diet- DASH      Dispo- Clinically acute. Pending cardio/EP plan   61 y/o M admitted for syncope and bradycardia with +CCTA; now s/p [facility] with intervention and CHETAN placement x2 to the right coronary artery.    1. Syncope  - 2/2 to bradycardia, underling ischemia now s/p LHC and CHETAN x 2  - Still with near syncopal symptoms/episodes  - MRI negative for stroke  - Seen by neuro, no need for EEG  - S/p LHC on 7/30, s/p CHETAN x 2 to RCA  - EP following, may need PPM, follow recs  - F/u tick borne panel  - PT pending      2. CAD (coronary artery disease  - S/p LHC 7/30 with  CHETAN x2 to RCA   - C/w DAPT w/ ASA/Plavix, lipitor 80 mg  - Hold BB 2/2 bradycardia  - Tele monitoring 72 hours post revascularization.     3. Bradycardia  - Episode of symptomatic bradycardia this morning  - Tele reviewed, rhythm fluctuates between sinus anamika and junctional  - EP following.. per EP, c/w tele monitoring till 8/2 (Monday). If pacing is necessary, plan for ILR vs MCOT. However, after symptomatic episode of bradycardia this am, discussed with cardio.. pt may need PPM. Cardio to further discuss with EP. Follow plan  - Will hold BB  - TTE normal; no PFO, no valvular dysfxn, normal EF  - TSH WNL  - Atropine PRN for sx bradycardia. If persistent bradycardia, will consider ICU consult for TVP vs dopamine gtt.      DVT ppx- Lovenox 40 mg QD   Diet- DASH      Dispo- Clinically acute. Pending cardio/EP plan   61 y/o M admitted for syncope and bradycardia with +CCTA; now s/p [facility] with intervention and CHETAN placement x2 to the right coronary artery.    1. Syncope  - 2/2 to bradycardia, underling ischemia now s/p LHC and CHETAN x 2  - Still with near syncopal symptoms/episodes  - MRI negative for stroke  - Seen by neuro, no need for EEG  - S/p LHC on 7/30, s/p CHETAN x 2 to RCA  - EP following, may need PPM, follow recs  - F/u tick borne panel  - PT pending      2. CAD (coronary artery disease  - S/p LHC 7/30 with  CHETAN x2 to RCA   - C/w DAPT w/ ASA/Plavix, lipitor 80 mg  - Hold BB 2/2 bradycardia  - Tele monitoring 72 hours post revascularization.     3. Bradycardia  - Episode of symptomatic bradycardia this morning  - Tele reviewed, rhythm fluctuates between sinus anamika and junctional  - EP following.. per EP, c/w tele monitoring till 8/2 (Monday). If pacing is necessary, plan for ILR vs MCOT. However, after symptomatic episode of bradycardia this am, discussed with cardio.. pt may need PPM. Cardio to further discuss with EP. Follow plan  - Will hold BB  - TTE normal; no PFO, no valvular dysfxn, normal EF  - TSH WNL  - Atropine PRN for sx bradycardia. If persistent bradycardia, will consider ICU consult for TVP vs dopamine gtt.      DVT ppx- Lovenox 40 mg QD   Diet- DASH   PT eval pending     Dispo- Clinically acute. Pending cardio/EP plan

## 2021-08-01 NOTE — PROGRESS NOTE ADULT - PROBLEM SELECTOR PLAN 4
- DVT ppx- Lovenox 40 mg QD  - Diet- DASH  - Dispo- Pending PT recs. Will benefit from cardiac rehab.     - Patient deferred updating family members.     Patient is actively acute, no plan for discharge at this time pending clinical course. Patient is at high risk at the moment due to tele monitoring till 8/1 and pending PT recs.

## 2021-08-01 NOTE — PROGRESS NOTE ADULT - ATTENDING COMMENTS
pt seen and examined  Plan of care dw np.  EP input appreciated. Pt with episodes of symptomatic bradycardia  Keep npo post midnight  plan to have ppm vs ILR in AM

## 2021-08-01 NOTE — CHART NOTE - NSCHARTNOTEFT_GEN_A_CORE
Recurrent symptomatic bradycardia noted. Also will benefit from beta blocker therapy which is unlikely to be tolerated, at this point at least, without pacemaker in place. Will continue plan as stated with active telemetry monitoing for now, and plan for PPM or ILR implant tomorrow.     keep npo p md for possible ppm in am (will reassess in am).   ok to continue ASA/Plavix given recent PCI  NO lovenox, heparin

## 2021-08-01 NOTE — PROGRESS NOTE ADULT - SUBJECTIVE AND OBJECTIVE BOX
[facility] [id number]            [facility]                          [address] [address]                       Phone: [phone]. Fax:[phone]                      ________________________________________________    HPI:  59 y/o male with no known medical history who presents to [facility]ED for syncope. 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 Pt states he LOC for about 30 seconds, then he woke up with diaphoresis, and a headache. On , patient felt same symptoms without LOC, while sitting in a chair-symptoms lasted 1 minute in duration. , pt had dizziness while sitting in bed without getting up, no LOC. Pt called in sick from work and came to ED where CT Head-mild volume loss and involutional change, with no acute abnormality or hemorrhagic focus noted. Pt was DC'd with diagnosis of dehydration and was advised to increase PO water intake. - Pt had same symptoms while at work without LOC, symptoms lasted 1 minute. This morning , patient woke up with a HA sweating profusely room spinning. Pt went back to sleep woke up @ 7AM felt same symptoms again when he got up from bed. Pt denies chest pain, SOB, palpitations, fevers, chills, coughing prior to any syncopal event. In ED, Tropx2-negative, CTA-Occluded right vertebral artery V4 segment, CTA Neck-No hemodynamically significant stenosis of the bilateral cervical ICAs using NASCET criteria. 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 Patient had 2 pre-syncopal events while in hospital, no LOC. During one event, patient was noted to have bradycardia in 30s, with questionable pause. Pt admitted for syncope and bradycardia. (2021 18:54)    HOME MEDICATIONS:      ROS: All review of systems negative unless indicated otherwise below.                         PHYSICAL EXAM:    GENERAL: NAD  NECK: Supple, No JVD  NERVOUS SYSTEM:  Alert & Oriented X3, non focal neuro exam.   CHEST/LUNG: clear lungs, No rales, rhonchi, wheezing, or rubs  HEART: Regular rate and rhythm; s1 and s2 auscultated, No murmurs, rubs, or gallops  ABDOMEN: Soft, Nontender, Nondistended; Bowel sounds present and normoactive.   EXTREMITIES:  2+ Peripheral Pulses, No clubbing, cyanosis, or edema       Vital Signs Last 24 Hrs  T(C): 36.6 (01 Aug 2021 07:37), Max: 36.8 (2021 15:24)  T(F): 97.9 (01 Aug 2021 07:37), Max: 98.2 (2021 15:24)  HR: 54 (01 Aug 2021 07:37) (52 - 62)  BP: 149/80 (01 Aug 2021 07:37) (115/72 - 154/83)  BP(mean): 103 (01 Aug 2021 07:37) (95 - 103)  RR: 17 (01 Aug 2021 07:37) (14 - 18)  SpO2: 96% (01 Aug 2021 07:37) (96% - 100%)                                                   DAILY WEIGHTS - 48 HOUR TREND     Daily Weight in k.4 (01 Aug 2021 06:22), Weight in k.2 (2021 04:00)                             INTAKE AND OUTPUT - 48 HOUR TREND     21 @ 07:  -  21 @ 07:00  --------------------------------------------------------  IN:  Total IN: 0 mL    OUT:    Voided (mL): 1190 mL  Total OUT: 1190 mL    Total NET: -1190 mL      21 @ 07:  -  21 @ 07:00  --------------------------------------------------------  IN:  Total IN: 0 mL    OUT:    Voided (mL): 750 mL  Total OUT: 750 mL    Total NET: -750 mL                                                           LAB RESULTS                 COMPLETE BLOOD COUNT( 01 Aug 2021 06:33 )                            13.4 g/dL  5.32 K/uL )---------------( 231 K/uL                        38.2 %<L>      Automated Differential     Auto Basophil # - X      Auto Basophil % - X      Auto Eosinophil # - X      Auto Eosinophil % - X      Auto Immature Granulocyte # - X      Auto Immature Granulocyte % - X      Auto Lymphocyte # - X      Auto Lymphocyte % - X      Auto Monocyte # - X      Auto Monocyte % - X      Auto Neutrophil # - X      Auto Neutrophil % - X                                      CHEMISTRY                 Basic Metabolic Panel (21 @ 06:33)    140  |  108<H>  |  9.8  ----------------------------<  97  5.4<H>   |  25.0  |  0.81    Ca    9.4      01 Aug 2021 06:33  Phos  3.9       Mg     1.8     08                    Liver Functions (21 @ 06:33))  TPro  6.4  /  Alb  3.5  /  TBili  0.4  /  DBili  x   /  AST  26  /  ALT  16  /  AlkPhos  130     PT/INR/PTT ( 2021 09:41 )                        :                       :      13.1         :       27.9                  .        .                   .              .           .       1.14        .                                                                 Cardiac Enzymes   ( 2021 06:30 )  Troponin T  <0.01,  CPK  X    , CKMB  X    , BNP X        , ( 2021 15:55 )  Troponin T  <0.01,  CPK  X    , CKMB  X    , BNP X        , ( 2021 12:46 )  Troponin T  <0.01,  CPK  X    , CKMB  X    , BNP X                                 Current Admission Active Medications    acetaminophen   Tablet .. 650 milliGRAM(s) Oral every 6 hours PRN Temp greater or equal to 38.5C (101.3F), Mild Pain (1 - 3)  aluminum hydroxide/magnesium hydroxide/simethicone Suspension 30 milliLiter(s) Oral every 4 hours PRN Dyspepsia  aspirin  chewable 81 milliGRAM(s) Oral once  atorvastatin 80 milliGRAM(s) Oral at bedtime  atropine Injectable 0.5 milliGRAM(s) IV Push once PRN Symptomatic bradycadia  clopidogrel Tablet 75 milliGRAM(s) Oral daily  enoxaparin Injectable 40 milliGRAM(s) SubCutaneous daily  ondansetron Injectable 4 milliGRAM(s) IV Push every 8 hours PRN Nausea and/or Vomiting                          RADIOLOGY RESULTS: Personally visualized   < from: Xray Chest 2 Views PA/Lat (21 @ 10:01) >  IMPRESSION:  Negative radiographs    < end of copied text >                          CARDIOLOGY RESULTS: Official Report/Preliminary Verbal Reports  < from: TTE Echo Complete w/o Contrast w/ Doppler (21 @ 13:36) >  Summary:   1. Normal left atrial size.   2. Color flow doppler and intravenous injection of agitated saline demonstrates the presence of an intact intra atrial septum.   3. Normal wall motion. Left ventricular ejection fraction, by visual estimation, is 65 to 70%. Grade I diastolic dysfunction.   4. Normal right atrial size.   5. Normal right ventricular size and function.   6. No significant valvular abnormality.   7. There is no evidence of pericardial effusion.    MD Shayy, RPVI Electronically signed on 2021 at 8:17:06 PM    < end of copied text >                            CARDIOLOGY REVIEW: Personally visualized and reviewed  Telemetry:  SB/JR 35-45 symptomatic

## 2021-08-02 ENCOUNTER — TRANSCRIPTION ENCOUNTER (OUTPATIENT)
Age: 61
End: 2021-08-02

## 2021-08-02 LAB
ALBUMIN SERPL ELPH-MCNC: 3.4 G/DL — SIGNIFICANT CHANGE UP (ref 3.3–5.2)
ALP SERPL-CCNC: 130 U/L — HIGH (ref 40–120)
ALT FLD-CCNC: 18 U/L — SIGNIFICANT CHANGE UP
ANION GAP SERPL CALC-SCNC: 9 MMOL/L — SIGNIFICANT CHANGE UP (ref 5–17)
APTT BLD: 28.9 SEC — SIGNIFICANT CHANGE UP (ref 27.5–35.5)
AST SERPL-CCNC: 24 U/L — SIGNIFICANT CHANGE UP
BILIRUB SERPL-MCNC: 0.4 MG/DL — SIGNIFICANT CHANGE UP (ref 0.4–2)
BUN SERPL-MCNC: 15.4 MG/DL — SIGNIFICANT CHANGE UP (ref 8–20)
CALCIUM SERPL-MCNC: 9.2 MG/DL — SIGNIFICANT CHANGE UP (ref 8.6–10.2)
CHLORIDE SERPL-SCNC: 106 MMOL/L — SIGNIFICANT CHANGE UP (ref 98–107)
CO2 SERPL-SCNC: 26 MMOL/L — SIGNIFICANT CHANGE UP (ref 22–29)
CREAT SERPL-MCNC: 0.86 MG/DL — SIGNIFICANT CHANGE UP (ref 0.5–1.3)
GLUCOSE SERPL-MCNC: 91 MG/DL — SIGNIFICANT CHANGE UP (ref 70–99)
HCT VFR BLD CALC: 37.9 % — LOW (ref 39–50)
HGB BLD-MCNC: 12.9 G/DL — LOW (ref 13–17)
INR BLD: 1.17 RATIO — HIGH (ref 0.88–1.16)
MAGNESIUM SERPL-MCNC: 1.6 MG/DL — SIGNIFICANT CHANGE UP (ref 1.6–2.6)
MCHC RBC-ENTMCNC: 34 GM/DL — SIGNIFICANT CHANGE UP (ref 32–36)
MCHC RBC-ENTMCNC: 38.2 PG — HIGH (ref 27–34)
MCV RBC AUTO: 112.1 FL — HIGH (ref 80–100)
PHOSPHATE SERPL-MCNC: 4.1 MG/DL — SIGNIFICANT CHANGE UP (ref 2.4–4.7)
PLATELET # BLD AUTO: 227 K/UL — SIGNIFICANT CHANGE UP (ref 150–400)
POTASSIUM SERPL-MCNC: 5.1 MMOL/L — SIGNIFICANT CHANGE UP (ref 3.5–5.3)
POTASSIUM SERPL-SCNC: 5.1 MMOL/L — SIGNIFICANT CHANGE UP (ref 3.5–5.3)
PROT SERPL-MCNC: 6.3 G/DL — LOW (ref 6.6–8.7)
PROTHROM AB SERPL-ACNC: 13.5 SEC — SIGNIFICANT CHANGE UP (ref 10.6–13.6)
RBC # BLD: 3.38 M/UL — LOW (ref 4.2–5.8)
RBC # FLD: 14.2 % — SIGNIFICANT CHANGE UP (ref 10.3–14.5)
SODIUM SERPL-SCNC: 141 MMOL/L — SIGNIFICANT CHANGE UP (ref 135–145)
WBC # BLD: 6.31 K/UL — SIGNIFICANT CHANGE UP (ref 3.8–10.5)
WBC # FLD AUTO: 6.31 K/UL — SIGNIFICANT CHANGE UP (ref 3.8–10.5)

## 2021-08-02 PROCEDURE — 99232 SBSQ HOSP IP/OBS MODERATE 35: CPT

## 2021-08-02 PROCEDURE — 99233 SBSQ HOSP IP/OBS HIGH 50: CPT

## 2021-08-02 PROCEDURE — 99233 SBSQ HOSP IP/OBS HIGH 50: CPT | Mod: 25

## 2021-08-02 PROCEDURE — 33208 INSRT HEART PM ATRIAL & VENT: CPT

## 2021-08-02 PROCEDURE — 93010 ELECTROCARDIOGRAM REPORT: CPT

## 2021-08-02 PROCEDURE — 71045 X-RAY EXAM CHEST 1 VIEW: CPT | Mod: 26

## 2021-08-02 RX ORDER — CEFAZOLIN SODIUM 1 G
2000 VIAL (EA) INJECTION
Refills: 0 | Status: COMPLETED | OUTPATIENT
Start: 2021-08-03 | End: 2021-08-03

## 2021-08-02 RX ORDER — OXYCODONE AND ACETAMINOPHEN 5; 325 MG/1; MG/1
1 TABLET ORAL EVERY 4 HOURS
Refills: 0 | Status: DISCONTINUED | OUTPATIENT
Start: 2021-08-02 | End: 2021-08-03

## 2021-08-02 RX ADMIN — Medication 81 MILLIGRAM(S): at 12:29

## 2021-08-02 RX ADMIN — CLOPIDOGREL BISULFATE 75 MILLIGRAM(S): 75 TABLET, FILM COATED ORAL at 12:29

## 2021-08-02 RX ADMIN — ATORVASTATIN CALCIUM 80 MILLIGRAM(S): 80 TABLET, FILM COATED ORAL at 22:07

## 2021-08-02 NOTE — PROGRESS NOTE ADULT - PROBLEM SELECTOR PLAN 4
- DVT ppx- On hold for PPM placement.  - Diet- DASH  - Dispo- Pending PT recs. Will benefit from cardiac rehab.     - Patient deferred updating family members.     Patient is actively acute, no plan for discharge at this time pending clinical course. Patient is at high risk at the moment due to persistent syncopal episodes and plan for PPM placement today.

## 2021-08-02 NOTE — PROGRESS NOTE ADULT - PROBLEM SELECTOR PROBLEM 2
CAD (coronary artery disease)
CAD (coronary artery disease)
Bradycardia
CAD (coronary artery disease)

## 2021-08-02 NOTE — DISCHARGE NOTE PROVIDER - CARE PROVIDERS DIRECT ADDRESSES
,DirectAddress_Unknown ,DirectAddress_Unknown,[email or web address],DirectAddress_Unknown

## 2021-08-02 NOTE — PROGRESS NOTE ADULT - ATTENDING COMMENTS
Blaine is still having symptomatic bradycardia.  no cp or sob. With bradycardia, he gets a pain behind his head.   Spoke with EP. plan for ppm today.

## 2021-08-02 NOTE — DISCHARGE NOTE PROVIDER - PROVIDER TOKENS
PROVIDER:[TOKEN:[[id number]],FOLLOWUP:[2 weeks],ESTABLISHEDPATIENT:[T]] PROVIDER:[TOKEN:[[id number]],FOLLOWUP:[2 weeks],ESTABLISHEDPATIENT:[T]],PROVIDER:[TOKEN:[[id number]],FOLLOWUP:[Routine]],PROVIDER:[TOKEN:[[id number]],FOLLOWUP:[1 month]]

## 2021-08-02 NOTE — PROGRESS NOTE ADULT - PROBLEM SELECTOR PLAN 1
-Pt is already in-patient; CHETAN x2 to RCA long lesion  -post cardiac cath orders  -right radial precautions  -bedrest x 1 hour post procedure  -EKG post cath  -labs and EKG in am  -continue current medical therapy  -Dual anti platelet therapy with aspirin/ plavix   -statin therapy; atorvastatin 80mg daily  -beta blocker not indicated at this time due to bradycardia; EP following  -[facility] following during hospitalization  -cardiac rehab info provided/referral and communication to cardiac rehab completed  -Management per Hospitalist   -Discharge in am if overnight tele, EKG, labs in am all remain WNL
-mRCA and dRCA with +intervention; iFR was negative for Cx/OM despite CCTA  -Dual anti platelet therapy with aspirin/ plavix   -statin therapy; atorvastatin 80mg daily  -continue tele monitoring; post revascularization of RCA; increase activity and PO hydration  -Tobacco cessation  -tele monitoring 72 hours post revascularization
- MRI negative for stroke  - appreciate neurology recs- discontinued vEEG and neuro checks  - Now improved s/p [facility] on 7/30.   - F/u tick borne panel  - F/u PT recs
- 2/2 to bradycardia, underling ischemia now s/p LHC and CHETAN x 2  - Still with near syncopal symptoms/episodes  - MRI negative for stroke  - Seen by neuro, no need for EEG  - S/p LHC on 7/30, s/p CHETAN x 2 to RCA  - F/u tick borne panel  - Plan for PPM placement today

## 2021-08-02 NOTE — PROGRESS NOTE ADULT - SUBJECTIVE AND OBJECTIVE BOX
Pt feeling well this am, however, he had another episode of dizziness occured after walking to bathroom, and per RN correlated with sinus bradycardia in 30s bpm.     Otherwise has remained in sinus rhythm with rates 40-50s bpm at rest, up to 70-80 bpm with exertion.     Due to bradycardia he has not been started on beta blockade.     MEDICATIONS  (STANDING):  aspirin  chewable 81 milliGRAM(s) Oral daily  atorvastatin 80 milliGRAM(s) Oral at bedtime  clopidogrel Tablet 75 milliGRAM(s) Oral daily    MEDICATIONS  (PRN):  acetaminophen   Tablet .. 650 milliGRAM(s) Oral every 6 hours PRN Temp greater or equal to 38.5C (101.3F), Mild Pain (1 - 3)  aluminum hydroxide/magnesium hydroxide/simethicone Suspension 30 milliLiter(s) Oral every 4 hours PRN Dyspepsia  atropine Injectable 0.5 milliGRAM(s) IV Push once PRN Symptomatic bradycadia  ondansetron Injectable 4 milliGRAM(s) IV Push every 8 hours PRN Nausea and/or Vomiting      Allergies    No Known Allergies    Intolerances      PAST MEDICAL & SURGICAL HISTORY:  No pertinent past medical history    History of arthroscopic knee surgery        Vital Signs Last 24 Hrs  T(C): 36.5 (02 Aug 2021 12:00), Max: 36.9 (01 Aug 2021 16:19)  T(F): 97.7 (02 Aug 2021 12:00), Max: 98.5 (01 Aug 2021 16:19)  HR: 55 (02 Aug 2021 12:00) (48 - 57)  BP: 142/82 (02 Aug 2021 12:00) (132/72 - 158/82)  BP(mean): 102 (02 Aug 2021 12:00) (90 - 107)  RR: 18 (02 Aug 2021 12:00) (17 - 18)  SpO2: 94% (02 Aug 2021 12:00) (94% - 98%)    Physical Exam:  Constitutional: NAD, AAOx3  Cardiovascular: +S1S2 RRR  Pulmonary: CTA b/l, unlabored  GI: soft NTND +BS  Extremities: no pedal edema, +distal pulses b/l  Neuro: non focal, PEARL x4    LABS:                        12.9   6.31  )-----------( 227      ( 02 Aug 2021 06:59 )             37.9     08-02    141  |  106  |  15.4  ----------------------------<  91  5.1   |  26.0  |  0.86    Ca    9.2      02 Aug 2021 06:59  Phos  4.1     08-02  Mg     1.6     08-02    TPro  6.3<L>  /  Alb  3.4  /  TBili  0.4  /  DBili  x   /  AST  24  /  ALT  18  /  AlkPhos  130<H>  08-02    PT/INR - ( 02 Aug 2021 06:59 )   PT: 13.5 sec;   INR: 1.17 ratio         PTT - ( 02 Aug 2021 06:59 )  PTT:28.9 sec      RADIOLOGY & ADDITIONAL TESTS:  RADIOLOGY & ADDITIONAL TESTS:  < from: TTE Echo Complete w/o Contrast w/ Doppler (07.29.21 @ 13:36) >   1. Normal left atrial size.   2. Color flow doppler and intravenous injection of agitated saline demonstrates the presence of an intact intra atrial septum.   3. Normal wall motion. Left ventricular ejection fraction, by visual estimation, is 65 to 70%. Grade I diastolic dysfunction.   4. Normal right atrial size.   5. Normal right ventricular size and function.   6. No significant valvular abnormality.   7. There is no evidence of pericardial effusion.  < end of copied text >    Cardiac Cath 7/30/21:  Right radial access +hemoband; no access complications  mRCA 80% -->resolute kervin 3.0 x24mm  dRCA 70% --> resolute kervin 2.74 x20mm  IVUS used with intervention  pLAD moderate disease 40-50% iFR 0.95  Cx-OM 50% iFR 0.98

## 2021-08-02 NOTE — DISCHARGE NOTE PROVIDER - NSDCCPTREATMENT_GEN_ALL_CORE_FT
PRINCIPAL PROCEDURE  Procedure: Implantation of intravenous dual chamber permanent cardiac pacemaker  Findings and Treatment: Cardiac Device Implant Post Operative Instructions  - Do not touch the incision until it is completely healed.   - There are Steristrips (white strips of tape) on your incision, which will start to peel off on their own over the next 2-3 weeks. Do not pick at or peel off the Steristrips.   - Bruising around the implant site or over the chest, side or arm near the incision is normal, and will take a few weeks to resolve.  -Do not lift the affected arm higher than 90 degrees (shoulder height) in any direction for 4 weeks.   - Do not push, pull or lift anything heavier than 10 lbs (about a gallon of milk) with the affected arm for 4 weeks.     - Do not apply soaps, creams, lotions, ointments or powders to the incision until it is completely healed.  - You may take a shower in 24 hours, and allow the water to run over the incision. However, do not submerge the incision in water: do not swim or soak in bath tubs, hot tubs, swimming pools, etc.   You should call the doctor if:   - You notice redness, drainage, swelling, increased tenderness, hot sensation around the incision, bleeding or incision edges pulling apart.  - Your temperature is greater than 100 degrees F for more than 24 hours.  - You notice swelling or bulging at the incision or around the device that was not there when you left the hospital or is increasing in size.  - You experience increased difficulty breathing.  - You notice new/worsening swelling in your legs and ankles.  - You faint or have dizzy spells.  - You have any questions or concerns regarding your device or the procedure.

## 2021-08-02 NOTE — DISCHARGE NOTE PROVIDER - CARE PROVIDER_API CALL
Jose Raul Rose)  Cardiology  [address] [place] [address]  Phone: [phone]  Fax: [phone]  Established Patient  Follow Up Time: 2 weeks   Jose Raul Rose (MD)  Cardiology  [address] [place] [address]  Phone: [phone]  Fax: [phone]  Established Patient  Follow Up Time: 2 weeks    Adonis Kidd; PhD)  Neurology; Vascular Neurology  [address] [facility] [address]  [place] [address]  Phone: [phone]  Fax: [phone]  Follow Up Time: Routine    Zoey Herzog (DO)  Internal Medicine  [address] [place] [address]  Phone: [phone]  Fax: [phone]  Follow Up Time: 1 month

## 2021-08-02 NOTE — PROGRESS NOTE ADULT - PROBLEM SELECTOR PLAN 3
- will hold BB  - TSH- WNL  - atropine PRN fopr sx bradycardia. If persistent bradycardia, will consider ICU consult for TVP vs dopamine gtt.
- will hold BB  - TSH- WNL  - atropine PRN for sx bradycardia.  - If persistent bradycardia, will consider ICU consult for TVP vs dopamine gtt.  - Plan for PPM placement today
- will hold BB  - TSH- WNL  - atropine PRN fopr sx bradycardia. If persistent bradycardia, will consider ICU consult for TVP vs dopamine gtt.

## 2021-08-02 NOTE — DISCHARGE NOTE PROVIDER - HOSPITAL COURSE
61 y/o M current smoker, with no prior medical history  admitted for syncope and bradycardia, underling ischemia,   with +CCTA; now s/p [facility] with intervention and CHETAN placement x2 to the right coronary artery.   MRI negative for stroke.  Seen by neurology, no need for EEG.   Pt. still  with near syncopal symptoms/episodes, despite revascularization. BB held due to  symptomatic sinus bradycardia. TSH- WNL. Atropine PRN for sx bradycardia.  PPM placement. s/p Medtronic dual chamber pacemaker implant via left axillary venipuncture. Received  Ancef 2gm IV q 8 hours x 2 additional doses to complete 24 hour course.   Pain control with PO analgesia PRN.     Vital Signs Last 24 Hrs  T(C): 36.6 (03 Aug 2021 07:53), Max: 36.6 (03 Aug 2021 01:05)  T(F): 97.9 (03 Aug 2021 07:53), Max: 97.9 (03 Aug 2021 07:53)  HR: 75 (03 Aug 2021 07:53) (53 - 75)  BP: 125/79 (03 Aug 2021 07:53) (114/71 - 159/91)  BP(mean): 95 (03 Aug 2021 07:53) (95 - 102)  RR: 17 (03 Aug 2021 07:53) (14 - 18)  SpO2: 99% (03 Aug 2021 07:53) (94% - 100%)    ROS: negative  PHYSICAL EXAM:  CONSTITUTIONAL: NAD, well-developed, well-groomed  RESPIRATORY: Normal respiratory effort; lungs are clear to auscultation bilaterally  CARDIOVASCULAR: Regular rate and rhythm, normal S1 and S2, no murmur/rub/gallop; No lower extremity edema; Peripheral pulses are 2+ bilaterally  ABDOMEN: Nontender to palpation, normoactive bowel sounds, no rebound/guarding; No hepatosplenomegaly  MUSCLOSKELETAL:  Normal gait; no clubbing or cyanosis of digits; no joint swelling or tenderness to palpation  PSYCH: A+O to person, place, and time; affect appropriate  NEUROLOGY: CN 2-12 are intact and symmetric; no gross sensory deficits   61 y/o M current smoker, with no prior medical history admitted for syncope and bradycardia, underling ischemia,   with +CCTA; now s/p [facility] with intervention and CHETAN placement x2 to the right coronary artery.   MRI negative for stroke.  Seen by neurology, no need for EEG.   Pt. still  with near syncopal symptoms/episodes, despite revascularization. BB held due to  symptomatic sinus bradycardia. TSH- WNL. Atropine PRN for sx bradycardia.  PPM placement. s/p Medtronic dual chamber pacemaker implant via left axillary venipuncture. Received  Ancef 2gm IV q 8 hours x 2 additional doses to complete 24 hour course.   Pain control with PO analgesia PRN.     Vital Signs Last 24 Hrs  T(C): 36.6 (03 Aug 2021 07:53), Max: 36.6 (03 Aug 2021 01:05)  T(F): 97.9 (03 Aug 2021 07:53), Max: 97.9 (03 Aug 2021 07:53)  HR: 75 (03 Aug 2021 07:53) (53 - 75)  BP: 125/79 (03 Aug 2021 07:53) (114/71 - 159/91)  BP(mean): 95 (03 Aug 2021 07:53) (95 - 102)  RR: 17 (03 Aug 2021 07:53) (14 - 18)  SpO2: 99% (03 Aug 2021 07:53) (94% - 100%)    ROS: negative  PHYSICAL EXAM:  CONSTITUTIONAL: NAD, well-developed, well-groomed  RESPIRATORY: Normal respiratory effort; lungs are clear to auscultation bilaterally  CARDIOVASCULAR: Regular rate and rhythm, normal S1 and S2, no murmur/rub/gallop; No lower extremity edema; Peripheral pulses are 2+ bilaterally  ABDOMEN: Nontender to palpation, normoactive bowel sounds, no rebound/guarding; No hepatosplenomegaly  MUSCLOSKELETAL:  Normal gait; no clubbing or cyanosis of digits; no joint swelling or tenderness to palpation  PSYCH: A+O to person, place, and time; affect appropriate  NEUROLOGY: CN 2-12 are intact and symmetric; no gross sensory deficits   59 y/o M current smoker, with no prior medical history admitted for syncope and bradycardia, underling ischemia,   with +CCTA; now s/p [facility] with intervention and CHETAN placement x2 to the right coronary artery.   MRI negative for stroke.  Seen by neurology, no need for EEG.   Pt. still  with near syncopal symptoms/episodes, despite revascularization. BB held due to  symptomatic sinus bradycardia. TSH- WNL. Atropine PRN for sx bradycardia.  PPM placement. s/p Medtronic dual chamber pacemaker implant via left axillary venipuncture. Received  Ancef 2gm IV q 8 hours x 2 additional doses to complete 24 hour course.   Pain control with PO analgesia PRN.     Vital Signs Last 24 Hrs  T(C): 36.6 (03 Aug 2021 07:53), Max: 36.6 (03 Aug 2021 01:05)  T(F): 97.9 (03 Aug 2021 07:53), Max: 97.9 (03 Aug 2021 07:53)  HR: 75 (03 Aug 2021 07:53) (53 - 75)  BP: 125/79 (03 Aug 2021 07:53) (114/71 - 159/91)  BP(mean): 95 (03 Aug 2021 07:53) (95 - 102)  RR: 17 (03 Aug 2021 07:53) (14 - 18)  SpO2: 99% (03 Aug 2021 07:53) (94% - 100%)    ROS: negative  PHYSICAL EXAM:  CONSTITUTIONAL: NAD, well-developed, well-groomed  RESPIRATORY: Normal respiratory effort; lungs are clear to auscultation bilaterally  CARDIOVASCULAR: Regular rate and rhythm, normal S1 and S2, no murmur/rub/gallop; No lower extremity edema; Peripheral pulses are 2+ bilaterally. PPM site c/d/i  ABDOMEN: Nontender to palpation, normoactive bowel sounds, no rebound/guarding; No hepatosplenomegaly  MUSCLOSKELETAL:  Normal gait; no clubbing or cyanosis of digits; no joint swelling or tenderness to palpation  PSYCH: A+O to person, place, and time; affect appropriate  NEUROLOGY: CN 2-12 are intact and symmetric; no gross sensory deficits    Time spent on patients discharge 38 minutes    59 y/o M current smoker, with no prior medical history admitted for syncope and bradycardia, underling ischemia,   with +CCTA; now s/p [facility] with intervention and CHETAN placement x2 to the right coronary artery.   MRI negative for stroke.  Seen by neurology, no need for EEG. Pt. still  with near syncopal symptoms/episodes, despite revascularization. BB held due to  symptomatic sinus bradycardia. TSH- WNL. Atropine PRN for sx bradycardia. PPM placement. s/p Medtronic dual chamber pacemaker implant via left axillary venipuncture. Received  Ancef 2gm IV q 8 hours x 2 additional doses to complete 24 hour course.   Pain control with PO analgesia PRN.     Vital Signs Last 24 Hrs  T(C): 36.6 (03 Aug 2021 07:53), Max: 36.6 (03 Aug 2021 01:05)  T(F): 97.9 (03 Aug 2021 07:53), Max: 97.9 (03 Aug 2021 07:53)  HR: 75 (03 Aug 2021 07:53) (53 - 75)  BP: 125/79 (03 Aug 2021 07:53) (114/71 - 159/91)  BP(mean): 95 (03 Aug 2021 07:53) (95 - 102)  RR: 17 (03 Aug 2021 07:53) (14 - 18)  SpO2: 99% (03 Aug 2021 07:53) (94% - 100%)    ROS: negative  PHYSICAL EXAM:  CONSTITUTIONAL: NAD, well-developed, well-groomed  RESPIRATORY: Normal respiratory effort; lungs are clear to auscultation bilaterally  CARDIOVASCULAR: Regular rate and rhythm, normal S1 and S2, no murmur/rub/gallop; No lower extremity edema; Peripheral pulses are 2+ bilaterally. PPM site c/d/i  ABDOMEN: Nontender to palpation, normoactive bowel sounds, no rebound/guarding; No hepatosplenomegaly  MUSCLOSKELETAL:  Normal gait; no clubbing or cyanosis of digits; no joint swelling or tenderness to palpation  PSYCH: A+O to person, place, and time; affect appropriate  NEUROLOGY: CN 2-12 are intact and symmetric; no gross sensory deficits    Time spent on patients discharge 38 minutes

## 2021-08-02 NOTE — PROGRESS NOTE ADULT - ASSESSMENT
59 y/o M, active smoker, no significant medical hx admitted for syncope and bradycardia with +CCTA; now s/p cardiac catheterization with intervention and CHETAN placement x2 to the right coronary artery    CAD   -mRCA and dRCA with +intervention  - iFR was negative for Cx/OM despite CCTA  -Dual anti platelet therapy with aspirin/ plavix   -statin therapy; atorvastatin 80mg daily  -continue tele monitoring; post revascularization of RCA; increase activity and PO hydration  - Diet/lifestyle modifications and medication compliance heavily reinforced   -Tobacco cessation  -tele monitoring 72 hours post revascularization.     Bradycardia  - RCA territory ischemia s/p intervention   - now with sinus node dysfunction, symptomatic junctional rhythmn on telemetry    -TTE normal; no PFO, no valvular dysfxn, normal EF  -continue to hold AV renetta blockers at this time  - pt would benefit from beta blockers, will likely require PPM   - PPM this afternoon  - NPO since MN

## 2021-08-02 NOTE — PROGRESS NOTE ADULT - ASSESSMENT
60 year male current smoker, with no known medical history admitted 7/29/21 with multiple episodes of near syncope and syncope. Orthostatics positive at time of presentation. Telemetry revealed sinus bradycardia with intermittent junctional rhythm and some episoeds of dizziness correlating with bradycardia.  Abnormal CTA with significant CAD now POD #1 s/p LHC via RRA > s/p CHETAN x 2 to RCA. EP following for bradycardia and near syncope/syncope.     Despite revascularization he continues to have marked sinus bradycardia, including symptomatic episodes of dizziness correlating with worse bradycardia.   He has evidence of chronotropic incompetance and HR has not increased much over 80 bpm with exertion.     Also would benefit from beta blockade given CAD, but unable to tolerate it at this time due to bradycardia.     I discussed ppm indication with him in detail he does believe he remains symptomatic with bradycardia and therefore wants to proceed with ppm implant.   The risks and benefits of ppm implant were reviewed, he expressed understanding and wants to proceed.     -NPO for PPM today  -cont ASA and plavix

## 2021-08-02 NOTE — PROGRESS NOTE ADULT - SUBJECTIVE AND OBJECTIVE BOX
[facility] [id number]            [facility]                          [address] [address]                       Phone: [phone]. Fax:[phone]                      ________________________________________________    HPI:  59 y/o male with no known medical history who presents to [facility] for syncope. 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 Pt states he LOC for about 30 seconds, then he woke up with diaphoresis, and a headache. On , patient felt same symptoms without LOC, while sitting in a chair-symptoms lasted 1 minute in duration. , pt had dizziness while sitting in bed without getting up, no LOC. Pt called in sick from work and came to ED where CT Head-mild volume loss and involutional change, with no acute abnormality or hemorrhagic focus noted. Pt was DC'd with diagnosis of dehydration and was advised to increase PO water intake. - Pt had same symptoms while at work without LOC, symptoms lasted 1 minute. This morning , patient woke up with a HA sweating profusely room spinning. Pt went back to sleep woke up @ 7AM felt same symptoms again when he got up from bed. Pt denies chest pain, SOB, palpitations, fevers, chills, coughing prior to any syncopal event. In ED, Tropx2-negative, CTA-Occluded right vertebral artery V4 segment, CTA Neck-No hemodynamically significant stenosis of the bilateral cervical ICAs using NASCET criteria. 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 Patient had 2 pre-syncopal events while in hospital, no LOC. During one event, patient was noted to have bradycardia in 30s, with questionable pause. Pt admitted for syncope and bradycardia. (2021 18:54)    HOME MEDICATIONS:      ROS: All review of systems negative unless indicated otherwise below.                         PHYSICAL EXAM:    GENERAL: NAD  NECK: Supple, No JVD  NERVOUS SYSTEM:  Alert & Oriented X3, non focal neuro exam.   CHEST/LUNG: clear lungs, No rales, rhonchi, wheezing, or rubs  HEART: Regular rate and rhythm; s1 and s2 auscultated, No murmurs, rubs, or gallops  ABDOMEN: Soft, Nontender, Nondistended; Bowel sounds present and normoactive.   EXTREMITIES:  2+ Peripheral Pulses, No clubbing, cyanosis, or edema       Vital Signs Last 24 Hrs  T(C): 36.5 (02 Aug 2021 07:38), Max: 36.9 (01 Aug 2021 16:19)  T(F): 97.7 (02 Aug 2021 07:38), Max: 98.5 (01 Aug 2021 16:19)  HR: 57 (02 Aug 2021 07:38) (48 - 57)  BP: 150/77 (02 Aug 2021 07:38) (132/72 - 158/82)  BP(mean): 101 (02 Aug 2021 07:38) (90 - 107)  RR: 17 (02 Aug 2021 07:38) (17 - 18)  SpO2: 97% (02 Aug 2021 07:38) (96% - 98%)                                                   DAILY WEIGHTS - 48 HOUR TREND     Daily Weight in k.5 (02 Aug 2021 05:30), Weight in k.4 (01 Aug 2021 06:22)                             INTAKE AND OUTPUT - 48 HOUR TREND     21 @ 07:01  -  21 @ 07:00  --------------------------------------------------------  IN:  Total IN: 0 mL    OUT:    Voided (mL): 750 mL  Total OUT: 750 mL    Total NET: -750 mL                                                           LAB RESULTS                 COMPLETE BLOOD COUNT( 02 Aug 2021 06:59 )                            12.9 g/dL<L>  6.31 K/uL )---------------( 227 K/uL                        37.9 %<L>      Automated Differential     Auto Basophil # - X      Auto Basophil % - X      Auto Eosinophil # - X      Auto Eosinophil % - X      Auto Immature Granulocyte # - X      Auto Immature Granulocyte % - X      Auto Lymphocyte # - X      Auto Lymphocyte % - X      Auto Monocyte # - X      Auto Monocyte % - X      Auto Neutrophil # - X      Auto Neutrophil % - X                                      CHEMISTRY                 Basic Metabolic Panel (21 @ 06:59)    141  |  106  |  15.4  ----------------------------<  91  5.1   |  26.0  |  0.86    Ca    9.2      02 Aug 2021 06:59  Phos  4.1     08  Mg     1.6     08-02                    Liver Functions (21 @ 06:59))  TPro  6.3  /  Alb  3.4  /  TBili  0.4  /  DBili  x   /  AST  24  /  ALT  18  /  AlkPhos  130     PT/INR/PTT ( 02 Aug 2021 06:59 )                        :                       :      13.5         :       28.9                  .        .                   .              .           .       1.17        .                                                                 Cardiac Enzymes   ( 2021 06:30 )  Troponin T  <0.01,  CPK  X    , CKMB  X    , BNP X        , ( 2021 15:55 )  Troponin T  <0.01,  CPK  X    , CKMB  X    , BNP X        , ( 2021 12:46 )  Troponin T  <0.01,  CPK  X    , CKMB  X    , BNP X                                 Current Admission Active Medications    acetaminophen   Tablet .. 650 milliGRAM(s) Oral every 6 hours PRN Temp greater or equal to 38.5C (101.3F), Mild Pain (1 - 3)  aluminum hydroxide/magnesium hydroxide/simethicone Suspension 30 milliLiter(s) Oral every 4 hours PRN Dyspepsia  aspirin  chewable 81 milliGRAM(s) Oral daily  atorvastatin 80 milliGRAM(s) Oral at bedtime  atropine Injectable 0.5 milliGRAM(s) IV Push once PRN Symptomatic bradycadia  clopidogrel Tablet 75 milliGRAM(s) Oral daily  ondansetron Injectable 4 milliGRAM(s) IV Push every 8 hours PRN Nausea and/or Vomiting                          RADIOLOGY RESULTS: Personally visualized   < from: Xray Chest 2 Views PA/Lat (21 @ 10:01) >  IMPRESSION:  Negative radiographs    < end of copied text >                          CARDIOLOGY RESULTS: Official Report/Preliminary Verbal Reports  < from: TTE Echo Complete w/o Contrast w/ Doppler (21 @ 13:36) >  Summary:   1. Normal left atrial size.   2. Color flow doppler and intravenous injection of agitated saline demonstrates the presence of an intact intra atrial septum.   3. Normal wall motion. Left ventricular ejection fraction, by visual estimation, is 65 to 70%. Grade I diastolic dysfunction.   4. Normal right atrial size.   5. Normal right ventricular size and function.   6. No significant valvular abnormality.   7. There is no evidence of pericardial effusion.    MD Shayy, RPVI Electronically signed on 2021 at 8:17:06 PM    < end of copied text >

## 2021-08-02 NOTE — PROGRESS NOTE ADULT - SUBJECTIVE AND OBJECTIVE BOX
[facility] Division of Hospital Medicine    Chief Complaint:  Syncope with Bradycardia    SUBJECTIVE / OVERNIGHT EVENTS: Patient had one syncopal episode overnight. He was feeling lightheaded and had pins and needle sensation to RT arm.     Patient denies chest pain, SOB, abd pain, N/V, fever, chills, dysuria or any other complaints. All remainder ROS negative.     MEDICATIONS  (STANDING):  aspirin  chewable 81 milliGRAM(s) Oral daily  atorvastatin 80 milliGRAM(s) Oral at bedtime  clopidogrel Tablet 75 milliGRAM(s) Oral daily    MEDICATIONS  (PRN):  acetaminophen   Tablet .. 650 milliGRAM(s) Oral every 6 hours PRN Temp greater or equal to 38.5C (101.3F), Mild Pain (1 - 3)  aluminum hydroxide/magnesium hydroxide/simethicone Suspension 30 milliLiter(s) Oral every 4 hours PRN Dyspepsia  atropine Injectable 0.5 milliGRAM(s) IV Push once PRN Symptomatic bradycadia  ondansetron Injectable 4 milliGRAM(s) IV Push every 8 hours PRN Nausea and/or Vomiting        I&O's Summary      PHYSICAL EXAM:  Vital Signs Last 24 Hrs  T(C): 36.5 (02 Aug 2021 07:38), Max: 36.9 (01 Aug 2021 16:19)  T(F): 97.7 (02 Aug 2021 07:38), Max: 98.5 (01 Aug 2021 16:19)  HR: 57 (02 Aug 2021 07:38) (48 - 57)  BP: 150/77 (02 Aug 2021 07:38) (132/72 - 158/82)  BP(mean): 101 (02 Aug 2021 07:38) (90 - 107)  RR: 17 (02 Aug 2021 07:38) (17 - 18)  SpO2: 97% (02 Aug 2021 07:38) (96% - 98%)        CONSTITUTIONAL: NAD, well-developed, well-groomed  ENMT: Moist oral mucosa, no pharyngeal injection or exudates; normal dentition  RESPIRATORY: Normal respiratory effort; lungs are clear to auscultation bilaterally  CARDIOVASCULAR: Regular rate and rhythm, normal S1 and S2, no murmur/rub/gallop; No lower extremity edema; Peripheral pulses are 2+ bilaterally  ABDOMEN: Nontender to palpation, normoactive bowel sounds, no rebound/guarding; No hepatosplenomegaly  MUSCLOSKELETAL:  Normal gait; no clubbing or cyanosis of digits; no joint swelling or tenderness to palpation  PSYCH: A+O to person, place, and time; affect appropriate  NEUROLOGY: CN 2-12 are intact and symmetric; no gross sensory deficits;   SKIN: No rashes; no palpable lesions    LABS:                        12.9   6.31  )-----------( 227      ( 02 Aug 2021 06:59 )             37.9     08-02    141  |  106  |  15.4  ----------------------------<  91  5.1   |  26.0  |  0.86    Ca    9.2      02 Aug 2021 06:59  Phos  4.1     08-02  Mg     1.6     08-02    TPro  6.3<L>  /  Alb  3.4  /  TBili  0.4  /  DBili  x   /  AST  24  /  ALT  18  /  AlkPhos  130<H>  08-02    PT/INR - ( 02 Aug 2021 06:59 )   PT: 13.5 sec;   INR: 1.17 ratio         PTT - ( 02 Aug 2021 06:59 )  PTT:28.9 sec          CAPILLARY BLOOD GLUCOSE            RADIOLOGY & ADDITIONAL TESTS:  Results Reviewed:   Imaging Personally Reviewed:  Electrocardiogram Personally Reviewed:

## 2021-08-02 NOTE — PROGRESS NOTE ADULT - ASSESSMENT
61 y/o M admitted for syncope and bradycardia with +CCTA; now s/p [facility] with intervention and CHETAN placement x2 to the right coronary artery. Plan for PPM placement today.

## 2021-08-02 NOTE — PROGRESS NOTE ADULT - SUBJECTIVE AND OBJECTIVE BOX
PROCEDURE(S): Dual Chamber Pacemaker Implant    ELECTROPHYSIOLOGIST(S): Jose Raul Rose MD    COMPLICATIONS:  none          DISPOSITION:  Observation Unit           CONDITION: Stable    Pt doing well s/p Medtronic dual chamber pacemaker implant.  Pt denies complaint at this time.    Device setting: DDDR 70-130bpm     VS:   T(C): 36.5 (08-02-21 @ 15:36), Max: 36.9 (08-01-21 @ 20:30)  HR: 53 (08-02-21 @ 15:36) (50 - 57)  BP: 141/87 (08-02-21 @ 15:36) (132/72 - 158/82)  RR: 18 (08-02-21 @ 15:36) (17 - 18)  SpO2: 97% (08-02-21 @ 15:36) (94% - 98%)  Post-procedure VS: /95 HR 71 O2 sat 100% RR 16     Physical exam:   awake, alert, no obvious distress   Incision: Dressing C/D/I; no bleeding, hematoma, erythema or edema  Card: S1/S2, RRR, no m/g/r  Resp: lungs CTA b/l  Abd: S/NT/ND  Ext: no edema, distal pulses intact    CXR:   EKG:     Assessment:   60 year male current smoker, with no known medical history admitted 7/29/21 with multiple episodes of near syncope and syncope. Orthostatics positive at time of presentation. Telemetry revealed sinus bradycardia with intermittent junctional rhythm and some episodes of dizziness correlating with bradycardia.  Abnormal CTA with significant CAD now POD #1 s/p LHC via RRA > s/p CHETAN x 2 to RCA.   Despite revascularization he continues to have marked sinus bradycardia, including symptomatic episodes of dizziness correlating with worse bradycardia. He has evidence of chronotropic incompetance and HR has not increased much over 80 bpm with exertion.   Now status post uncomplicated dual chamber pacemaker implant.     Plan:   Port CXR now - r/o PTX or effusion, verify lead locations.   Bedrest x 4 hours post implant, then OOB w/ assist & progress as tolerated.    Continued observation on telemetry overnight.   Cont Ancef 2gm IV q 8 hours x 2 additional doses to complete 24 hour course.   Pain control with PO analgesia PRN.   NO HEPARIN OR LOVENOX, INCLUDING PROPHYLACTIC/SUBCUT DOSING, UNTIL OTHERWISE ADVISED BY EP.   EKG, Labs, PA/Lat CXR and device check in AM.   Will follow.  PROCEDURE(S): Dual Chamber Pacemaker Implant    ELECTROPHYSIOLOGIST(S): Jose Raul Rose MD    COMPLICATIONS:  none          DISPOSITION:  Observation Unit           CONDITION: Stable    Pt doing well s/p Medtronic dual chamber pacemaker implant via left axillary stick.  Pt denies complaint at this time.    Device setting: DDDR 70-130bpm     VS:   T(C): 36.5 (08-02-21 @ 15:36), Max: 36.9 (08-01-21 @ 20:30)  HR: 53 (08-02-21 @ 15:36) (50 - 57)  BP: 141/87 (08-02-21 @ 15:36) (132/72 - 158/82)  RR: 18 (08-02-21 @ 15:36) (17 - 18)  SpO2: 97% (08-02-21 @ 15:36) (94% - 98%)  Post-procedure VS: /95 HR 71 O2 sat 100% RR 16     Physical exam:   awake, alert, no obvious distress   Incision: Dressing C/D/I; no bleeding, hematoma, erythema or edema  Card: S1/S2, RRR, no m/g/r  Resp: lungs CTA b/l  Abd: S/NT/ND  Ext: no edema, distal pulses intact    CXR:   EKG: A paced 70bpm     Assessment:   60 year male current smoker, with no known medical history admitted 7/29/21 with multiple episodes of near syncope and syncope. Orthostatics positive at time of presentation. Telemetry revealed sinus bradycardia with intermittent junctional rhythm and some episodes of dizziness correlating with bradycardia.  Abnormal CTA with significant CAD now POD #1 s/p LHC via RRA > s/p CHETAN x 2 to RCA.   Despite revascularization he continues to have marked sinus bradycardia, including symptomatic episodes of dizziness correlating with worse bradycardia. He has evidence of chronotropic incompetance and HR has not increased much over 80 bpm with exertion.   Now status post uncomplicated dual chamber pacemaker implant.     Plan:   Port CXR now - r/o PTX or effusion, verify lead locations.   Bedrest x 4 hours post implant, then OOB w/ assist & progress as tolerated.    Continued observation on telemetry overnight.   Cont Ancef 2gm IV q 8 hours x 2 additional doses to complete 24 hour course.   Pain control with PO analgesia PRN.   NO HEPARIN OR LOVENOX, INCLUDING PROPHYLACTIC/SUBCUT DOSING, UNTIL OTHERWISE ADVISED BY EP.   EKG, Labs, PA/Lat CXR and device check in AM.   Will follow.

## 2021-08-02 NOTE — PROGRESS NOTE ADULT - PROBLEM SELECTOR PLAN 2
- S/p CHETAN x2 to RCA long lesion on 7/30  - C/w DAPT w/ ASA/Plavix, lipitor 80 mg  - Hold BB 2/2 bradycardia  - appreciate EP recs- c/w tele monitoring till 8/2 (Monday). If pacing is necessary, plan for ILR vs MCOT
- S/p CHETAN x2 to RCA long lesion on 7/30  - C/w DAPT w/ ASA/Plavix, lipitor 80 mg  - Hold BB 2/2 bradycardia. Will touch base with cardiology in regards to starting BB after PPM placement.   - appreciate EP recs
-TTE normal; no PFO, no valvular dysfxn, normal EF  -continue to hold AV renetta blockers at this time  -ambulate on TELE  -EP is following; pt likely to have outpatient tele monitoring; likely MCOT vs ILR? await official rec  -Neuro signed off pt; EEG not indicated at this time
- S/p CHETAN x2 to RCA long lesion on 7/30  - C/w DAPT w/ ASA/Plavix, lipitor 80 mg  - Hold BB 2/2 bradycardia  - appreciate EP recs- c/w tele monitoring till 8/2 (Monday). If pacing is necessary, plan for ILR vs MCOT

## 2021-08-02 NOTE — DISCHARGE NOTE PROVIDER - NSDCCPCAREPLAN_GEN_ALL_CORE_FT
PRINCIPAL DISCHARGE DIAGNOSIS  Diagnosis: Dizziness  Assessment and Plan of Treatment:       SECONDARY DISCHARGE DIAGNOSES  Diagnosis: Paresthesia  Assessment and Plan of Treatment:     Diagnosis: Vertebral artery occlusion  Assessment and Plan of Treatment:     Diagnosis: Bradycardia  Assessment and Plan of Treatment:      PRINCIPAL DISCHARGE DIAGNOSIS  Diagnosis: Dizziness  Assessment and Plan of Treatment: s/p LHC with intervention and CHETAN placement x2 to the right coronary artery.   MRI negative for stroke.  Seen by neurology, no need for EEG.   Pt. still  with near syncopal symptoms/episodes, despite revascularization. BB held due to  symptomatic sinus bradycardia. TSH- WNL. Atropine PRN for sx bradycardia.  PPM placement. s/p Medtronic dual chamber pacemaker implant via left axillary venipuncture. Received  Ancef 2gm IV q 8 hours x 2 additional doses to complete 24 hour course.   Pain control with PO analgesia PRN.      SECONDARY DISCHARGE DIAGNOSES  Diagnosis: Paresthesia  Assessment and Plan of Treatment: MRI negative for stroke.  Seen by neurology, no need for EEG.    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      Diagnosis: Bradycardia  Assessment and Plan of Treatment: BB held due to  symptomatic sinus bradycardia. TSH- WNL. Atropine PRN for sx bradycardia.  PPM placement. s/p Medtronic dual chamber pacemaker implant via left axillary venipuncture. Received  Ancef 2gm IV q 8 hours x 2 additional doses to complete 24 hour course.   Pain control with PO analgesia PRN.     PRINCIPAL DISCHARGE DIAGNOSIS  Diagnosis: Dizziness  Assessment and Plan of Treatment: s/p LHC with intervention and CHETAN placement x2 to the right coronary artery.   MRI negative for stroke.  Seen by neurology, no need for EEG.   Pt. still  with near syncopal symptoms/episodes, despite revascularization. BB held due to  symptomatic sinus bradycardia. TSH- WNL. Atropine PRN for sx bradycardia.  PPM placement. s/p Medtronic dual chamber pacemaker implant via left axillary venipuncture. Received  Ancef 2gm IV q 8 hours x 2 additional doses to complete 24 hour course.   Pain control with PO analgesia PRN.  Please no lifting/pushing/pulling >10 lbs & shoulder ROM limited to 90deg & below x 4 weeks. Please follow up with cardiology in 1-2 weeks.         SECONDARY DISCHARGE DIAGNOSES  Diagnosis: Paresthesia  Assessment and Plan of Treatment: MRI negative for stroke.  Seen by neurology, no need for EEG.    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      Diagnosis: Bradycardia  Assessment and Plan of Treatment: BB held due to  symptomatic sinus bradycardia. TSH- WNL. Atropine PRN for sx bradycardia.  PPM placement. s/p Medtronic dual chamber pacemaker implant via left axillary venipuncture. Received  Ancef 2gm IV q 8 hours x 2 additional doses to complete 24 hour course.   Pain control with PO analgesia PRN.

## 2021-08-03 ENCOUNTER — APPOINTMENT (OUTPATIENT)
Dept: FAMILY MEDICINE | Facility: CLINIC | Age: 61
End: 2021-08-03
Payer: COMMERCIAL

## 2021-08-03 ENCOUNTER — TRANSCRIPTION ENCOUNTER (OUTPATIENT)
Age: 61
End: 2021-08-03

## 2021-08-03 VITALS
TEMPERATURE: 97.9 F | SYSTOLIC BLOOD PRESSURE: 124 MMHG | WEIGHT: 148 LBS | OXYGEN SATURATION: 97 % | BODY MASS INDEX: 21.92 KG/M2 | DIASTOLIC BLOOD PRESSURE: 80 MMHG | HEIGHT: 69 IN | HEART RATE: 88 BPM | RESPIRATION RATE: 16 BRPM

## 2021-08-03 VITALS
RESPIRATION RATE: 17 BRPM | HEART RATE: 75 BPM | OXYGEN SATURATION: 99 % | SYSTOLIC BLOOD PRESSURE: 125 MMHG | TEMPERATURE: 98 F | DIASTOLIC BLOOD PRESSURE: 79 MMHG

## 2021-08-03 DIAGNOSIS — R79.89 OTHER SPECIFIED ABNORMAL FINDINGS OF BLOOD CHEMISTRY: ICD-10-CM

## 2021-08-03 DIAGNOSIS — F17.200 NICOTINE DEPENDENCE, UNSPECIFIED, UNCOMPLICATED: ICD-10-CM

## 2021-08-03 DIAGNOSIS — Z23 ENCOUNTER FOR IMMUNIZATION: ICD-10-CM

## 2021-08-03 LAB
A PHAGOCYTOPH DNA BLD QL NAA+PROBE: NEGATIVE — SIGNIFICANT CHANGE UP
ALBUMIN SERPL ELPH-MCNC: 3.6 G/DL — SIGNIFICANT CHANGE UP (ref 3.3–5.2)
ALP SERPL-CCNC: 121 U/L — HIGH (ref 40–120)
ALT FLD-CCNC: 24 U/L — SIGNIFICANT CHANGE UP
ANION GAP SERPL CALC-SCNC: 9 MMOL/L — SIGNIFICANT CHANGE UP (ref 5–17)
AST SERPL-CCNC: 35 U/L — SIGNIFICANT CHANGE UP
B MICROTI DNA BLD QL NAA+PROBE: NEGATIVE — SIGNIFICANT CHANGE UP
B MIYAMOTOI GLPQ BLD QL NAA+NON-PROBE: NEGATIVE — SIGNIFICANT CHANGE UP
BABESIA DNA SPEC QL NAA+PROBE: NEGATIVE — SIGNIFICANT CHANGE UP
BABESIA DNA SPEC QL NAA+PROBE: NEGATIVE — SIGNIFICANT CHANGE UP
BASOPHILS # BLD AUTO: 0.04 K/UL — SIGNIFICANT CHANGE UP (ref 0–0.2)
BASOPHILS NFR BLD AUTO: 0.7 % — SIGNIFICANT CHANGE UP (ref 0–2)
BILIRUB SERPL-MCNC: 0.4 MG/DL — SIGNIFICANT CHANGE UP (ref 0.4–2)
BUN SERPL-MCNC: 13 MG/DL — SIGNIFICANT CHANGE UP (ref 8–20)
CALCIUM SERPL-MCNC: 9.2 MG/DL — SIGNIFICANT CHANGE UP (ref 8.6–10.2)
CHLORIDE SERPL-SCNC: 104 MMOL/L — SIGNIFICANT CHANGE UP (ref 98–107)
CO2 SERPL-SCNC: 26 MMOL/L — SIGNIFICANT CHANGE UP (ref 22–29)
CREAT SERPL-MCNC: 0.87 MG/DL — SIGNIFICANT CHANGE UP (ref 0.5–1.3)
E CHAFFEENSIS DNA BLD QL NAA+PROBE: NEGATIVE — SIGNIFICANT CHANGE UP
E EWINGII DNA SPEC QL NAA+PROBE: NEGATIVE — SIGNIFICANT CHANGE UP
EHRLICHIA DNA SPEC QL NAA+PROBE: NEGATIVE — SIGNIFICANT CHANGE UP
EOSINOPHIL # BLD AUTO: 0.21 K/UL — SIGNIFICANT CHANGE UP (ref 0–0.5)
EOSINOPHIL NFR BLD AUTO: 3.5 % — SIGNIFICANT CHANGE UP (ref 0–6)
GLUCOSE SERPL-MCNC: 109 MG/DL — HIGH (ref 70–99)
HCT VFR BLD CALC: 39.4 % — SIGNIFICANT CHANGE UP (ref 39–50)
HGB BLD-MCNC: 13.4 G/DL — SIGNIFICANT CHANGE UP (ref 13–17)
IMM GRANULOCYTES NFR BLD AUTO: 0.3 % — SIGNIFICANT CHANGE UP (ref 0–1.5)
LYMPHOCYTES # BLD AUTO: 1.23 K/UL — SIGNIFICANT CHANGE UP (ref 1–3.3)
LYMPHOCYTES # BLD AUTO: 20.2 % — SIGNIFICANT CHANGE UP (ref 13–44)
MAGNESIUM SERPL-MCNC: 1.7 MG/DL — SIGNIFICANT CHANGE UP (ref 1.6–2.6)
MCHC RBC-ENTMCNC: 34 GM/DL — SIGNIFICANT CHANGE UP (ref 32–36)
MCHC RBC-ENTMCNC: 38 PG — HIGH (ref 27–34)
MCV RBC AUTO: 111.6 FL — HIGH (ref 80–100)
MONOCYTES # BLD AUTO: 0.64 K/UL — SIGNIFICANT CHANGE UP (ref 0–0.9)
MONOCYTES NFR BLD AUTO: 10.5 % — SIGNIFICANT CHANGE UP (ref 2–14)
NEUTROPHILS # BLD AUTO: 3.94 K/UL — SIGNIFICANT CHANGE UP (ref 1.8–7.4)
NEUTROPHILS NFR BLD AUTO: 64.8 % — SIGNIFICANT CHANGE UP (ref 43–77)
PLATELET # BLD AUTO: 237 K/UL — SIGNIFICANT CHANGE UP (ref 150–400)
POTASSIUM SERPL-MCNC: 4.9 MMOL/L — SIGNIFICANT CHANGE UP (ref 3.5–5.3)
POTASSIUM SERPL-SCNC: 4.9 MMOL/L — SIGNIFICANT CHANGE UP (ref 3.5–5.3)
PROT SERPL-MCNC: 6.2 G/DL — LOW (ref 6.6–8.7)
RBC # BLD: 3.53 M/UL — LOW (ref 4.2–5.8)
RBC # FLD: 14.2 % — SIGNIFICANT CHANGE UP (ref 10.3–14.5)
SODIUM SERPL-SCNC: 139 MMOL/L — SIGNIFICANT CHANGE UP (ref 135–145)
WBC # BLD: 6.08 K/UL — SIGNIFICANT CHANGE UP (ref 3.8–10.5)
WBC # FLD AUTO: 6.08 K/UL — SIGNIFICANT CHANGE UP (ref 3.8–10.5)

## 2021-08-03 PROCEDURE — C1769: CPT

## 2021-08-03 PROCEDURE — 36415 COLL VENOUS BLD VENIPUNCTURE: CPT

## 2021-08-03 PROCEDURE — 82962 GLUCOSE BLOOD TEST: CPT

## 2021-08-03 PROCEDURE — 93454 CORONARY ARTERY ANGIO S&I: CPT | Mod: 59

## 2021-08-03 PROCEDURE — 92978 ENDOLUMINL IVUS OCT C 1ST: CPT | Mod: RC

## 2021-08-03 PROCEDURE — 84436 ASSAY OF TOTAL THYROXINE: CPT

## 2021-08-03 PROCEDURE — C1785: CPT

## 2021-08-03 PROCEDURE — 71045 X-RAY EXAM CHEST 1 VIEW: CPT

## 2021-08-03 PROCEDURE — 80048 BASIC METABOLIC PNL TOTAL CA: CPT

## 2021-08-03 PROCEDURE — 70498 CT ANGIOGRAPHY NECK: CPT | Mod: MA

## 2021-08-03 PROCEDURE — C9600: CPT | Mod: RC

## 2021-08-03 PROCEDURE — 85025 COMPLETE CBC W/AUTO DIFF WBC: CPT

## 2021-08-03 PROCEDURE — 99153 MOD SED SAME PHYS/QHP EA: CPT

## 2021-08-03 PROCEDURE — 85027 COMPLETE CBC AUTOMATED: CPT

## 2021-08-03 PROCEDURE — 86769 SARS-COV-2 COVID-19 ANTIBODY: CPT

## 2021-08-03 PROCEDURE — C1892: CPT

## 2021-08-03 PROCEDURE — 84484 ASSAY OF TROPONIN QUANT: CPT

## 2021-08-03 PROCEDURE — C1898: CPT

## 2021-08-03 PROCEDURE — 80053 COMPREHEN METABOLIC PANEL: CPT

## 2021-08-03 PROCEDURE — C1753: CPT

## 2021-08-03 PROCEDURE — C1887: CPT

## 2021-08-03 PROCEDURE — 84100 ASSAY OF PHOSPHORUS: CPT

## 2021-08-03 PROCEDURE — 75574 CT ANGIO HRT W/3D IMAGE: CPT | Mod: MA

## 2021-08-03 PROCEDURE — 0042T: CPT

## 2021-08-03 PROCEDURE — 86803 HEPATITIS C AB TEST: CPT

## 2021-08-03 PROCEDURE — 83735 ASSAY OF MAGNESIUM: CPT

## 2021-08-03 PROCEDURE — 82803 BLOOD GASES ANY COMBINATION: CPT

## 2021-08-03 PROCEDURE — 97163 PT EVAL HIGH COMPLEX 45 MIN: CPT

## 2021-08-03 PROCEDURE — 71046 X-RAY EXAM CHEST 2 VIEWS: CPT | Mod: 26

## 2021-08-03 PROCEDURE — 86900 BLOOD TYPING SEROLOGIC ABO: CPT

## 2021-08-03 PROCEDURE — 99152 MOD SED SAME PHYS/QHP 5/>YRS: CPT

## 2021-08-03 PROCEDURE — 93010 ELECTROCARDIOGRAM REPORT: CPT

## 2021-08-03 PROCEDURE — 86850 RBC ANTIBODY SCREEN: CPT

## 2021-08-03 PROCEDURE — 70551 MRI BRAIN STEM W/O DYE: CPT | Mod: MA

## 2021-08-03 PROCEDURE — 87798 DETECT AGENT NOS DNA AMP: CPT

## 2021-08-03 PROCEDURE — C1894: CPT

## 2021-08-03 PROCEDURE — 71046 X-RAY EXAM CHEST 2 VIEWS: CPT

## 2021-08-03 PROCEDURE — U0003: CPT

## 2021-08-03 PROCEDURE — 93571 IV DOP VEL&/PRESS C FLO 1ST: CPT | Mod: LD

## 2021-08-03 PROCEDURE — 93005 ELECTROCARDIOGRAM TRACING: CPT

## 2021-08-03 PROCEDURE — 86901 BLOOD TYPING SEROLOGIC RH(D): CPT

## 2021-08-03 PROCEDURE — 70496 CT ANGIOGRAPHY HEAD: CPT | Mod: MA

## 2021-08-03 PROCEDURE — 70450 CT HEAD/BRAIN W/O DYE: CPT | Mod: MA

## 2021-08-03 PROCEDURE — 33208 INSRT HEART PM ATRIAL & VENT: CPT | Mod: KX

## 2021-08-03 PROCEDURE — 85610 PROTHROMBIN TIME: CPT

## 2021-08-03 PROCEDURE — C1725: CPT

## 2021-08-03 PROCEDURE — 84443 ASSAY THYROID STIM HORMONE: CPT

## 2021-08-03 PROCEDURE — 85730 THROMBOPLASTIN TIME PARTIAL: CPT

## 2021-08-03 PROCEDURE — 87635 SARS-COV-2 COVID-19 AMP PRB: CPT

## 2021-08-03 PROCEDURE — 99214 OFFICE O/P EST MOD 30 MIN: CPT

## 2021-08-03 PROCEDURE — 99285 EMERGENCY DEPT VISIT HI MDM: CPT

## 2021-08-03 PROCEDURE — C1874: CPT

## 2021-08-03 PROCEDURE — 80061 LIPID PANEL: CPT

## 2021-08-03 PROCEDURE — 93306 TTE W/DOPPLER COMPLETE: CPT

## 2021-08-03 PROCEDURE — U0005: CPT

## 2021-08-03 PROCEDURE — 99239 HOSP IP/OBS DSCHRG MGMT >30: CPT

## 2021-08-03 PROCEDURE — 93572 IV DOP VEL&/PRESS C FLO EA: CPT | Mod: LC

## 2021-08-03 RX ORDER — ASPIRIN/CALCIUM CARB/MAGNESIUM 324 MG
1 TABLET ORAL
Qty: 30 | Refills: 0
Start: 2021-08-03 | End: 2021-09-01

## 2021-08-03 RX ORDER — ATORVASTATIN CALCIUM 80 MG/1
1 TABLET, FILM COATED ORAL
Qty: 30 | Refills: 0
Start: 2021-08-03 | End: 2021-09-01

## 2021-08-03 RX ORDER — CLOPIDOGREL BISULFATE 75 MG/1
1 TABLET, FILM COATED ORAL
Qty: 30 | Refills: 0
Start: 2021-08-03 | End: 2021-09-01

## 2021-08-03 RX ADMIN — CLOPIDOGREL BISULFATE 75 MILLIGRAM(S): 75 TABLET, FILM COATED ORAL at 12:45

## 2021-08-03 RX ADMIN — Medication 100 MILLIGRAM(S): at 01:04

## 2021-08-03 RX ADMIN — Medication 650 MILLIGRAM(S): at 01:07

## 2021-08-03 RX ADMIN — Medication 81 MILLIGRAM(S): at 12:45

## 2021-08-03 RX ADMIN — Medication 650 MILLIGRAM(S): at 01:45

## 2021-08-03 RX ADMIN — Medication 100 MILLIGRAM(S): at 08:41

## 2021-08-03 NOTE — PROGRESS NOTE ADULT - NSICDXPILOT_GEN_ALL_CORE
[place]

## 2021-08-03 NOTE — PROGRESS NOTE ADULT - REASON FOR ADMISSION
syncope with bradycardia
Syncope and bradycardia
syncope with bradycardia

## 2021-08-03 NOTE — PROGRESS NOTE ADULT - ASSESSMENT
59 y/o M, active smoker, no significant medical hx admitted for syncope and bradycardia with +CCTA; now s/p cardiac catheterization with intervention and CHETAN placement x2 to the right coronary artery    CAD s/p PCI to the mRCA and dRCA  - iFR was negative for Cx/OM despite CCTA  -Dual anti platelet therapy with aspirin/ plavix   -statin therapy; atorvastatin 80mg daily  -continue tele monitoring; post revascularization of RCA; increase activity and PO hydration  - Diet/lifestyle modifications and medication compliance heavily reinforced   -Tobacco cessation  -patient will benefit from BB, and if no contraindication from EP standpoint will recommend to start Toprol XL 25mg po q daily     Bradycardia s/p PPM   - currently being A paced   - patient cleared from EP perspective     [facility]  Cardiology/Vascular Cardiology -[facility]   Telephone # [phone]

## 2021-08-03 NOTE — DISCHARGE NOTE NURSING/CASE MANAGEMENT/SOCIAL WORK - PATIENT PORTAL LINK FT
You can access the FollowMyHealth Patient Portal offered by [facility] by registering at the following website: [email or web address]. By joining [facility]’s FollowMyHealth portal, you will also be able to view your health information using other applications (apps) compatible with our system.

## 2021-08-03 NOTE — PROGRESS NOTE ADULT - SUBJECTIVE AND OBJECTIVE BOX
Pt doing well POD #1 s/p Medtronic dual chamber pacemaker implant via left axillary venipuncture. Stable overnight w/o event. Denies complaint.       Exam:   VSS, NAD, A&O x 3  Incision: Steri strips in place w/ small amt of dried heme at medial incision, but otherwise C/D/I; no bleeding, hematoma, erythema, exudate or edema; distal pulses intact  Card: S1/S2, RRR, no m/g/r  Resp: lungs CTA b/l  Abd: S/NT/ND  Ext: no edema, distal pulses intact    Device interrogation: measurements appropriate & stable. Parameters confirmed.   MDT Sharyn XT DR BUSH-DDDR 70-130bpm  Sensing: P = 2.8mV, R = 8.4mV  Impedance: A = 627ohms, RV = 798ohms  Threshold: A = 0.5V@0.4ms, RV = 0.25V@0.4ms  No events.     Tele: AP/VS, no sustained arrhythmias or acute changes.     CXR: PA/Lat pending    Labs:                      13.4   6.08  )-----------( 237      ( 03 Aug 2021 05:20 )             39.4     139  |  104  |  13.0  ----------------------------<  109<H>  4.9   |  26.0  |  0.87    Ca    9.2      03 Aug 2021 05:20  Phos  4.1     08-02  Mg     1.7     08-03    TPro  6.2<L>  /  Alb  3.6  /  TBili  0.4  /  DBili  x   /  AST  35  /  ALT  24  /  AlkPhos  121<H>  08-03      Assessment:   60y Male current smoker, with no prior medical history admitted 7/29/21 with multiple episodes of near syncope and syncope. Orthostatics positive at time of presentation. Telemetry revealed sinus bradycardia with intermittent junctional rhythm and some episodes of dizziness correlating with bradycardia. CTA demonstrated significant CAD, and he underwent LHC confirming severe RCA disease s/p PCI via CHETAN x 2 to RCA. Despite revascularization he continued to have marked symptomatic sinus bradycardia, as well as chronotropic incompetence. He is now POD #1 status post uncomplicated Medtronic dual chamber pacemaker implant via left axillary venipuncture.      Plan:   Pt instructed as to ROM of affected arm - no lifting/pushing/pulling >10 lbs & shoulder ROM limited to 90deg & below x 4 weeks.   Pt instructed as to incision care and f/up - written instructions included in d/c documents.  Outpt f/up in 1-2 weeks - our office will contact pt to schedule.   Will await PA/Lat CXR.  Pt doing well POD #1 s/p Medtronic dual chamber pacemaker implant via left axillary venipuncture. Stable overnight w/o event. Denies complaint.       Exam:   VSS, NAD, A&O x 3  Incision: Steri strips in place w/ small amt of dried heme at medial incision, but otherwise C/D/I; no bleeding, hematoma, erythema, exudate or edema; distal pulses intact  Card: S1/S2, RRR, no m/g/r  Resp: lungs CTA b/l  Abd: S/NT/ND  Ext: no edema, distal pulses intact    Device interrogation: measurements appropriate & stable. Parameters confirmed.   MDT Sharyn XT DR BUSH-DDDR 70-130bpm  Sensing: P = 2.8mV, R = 8.4mV  Impedance: A = 627ohms, RV = 798ohms  Threshold: A = 0.5V@0.4ms, RV = 0.25V@0.4ms  No events.     Tele: AP/VS, no sustained arrhythmias or acute changes.     CXR (PA/Lat): Lead locations grossly stable and appropriate, no aneta PTX or effusion. Official read pending.     Labs:                      13.4   6.08  )-----------( 237      ( 03 Aug 2021 05:20 )             39.4     139  |  104  |  13.0  ----------------------------<  109<H>  4.9   |  26.0  |  0.87    Ca    9.2      03 Aug 2021 05:20  Phos  4.1     08-02  Mg     1.7     08-03    TPro  6.2<L>  /  Alb  3.6  /  TBili  0.4  /  DBili  x   /  AST  35  /  ALT  24  /  AlkPhos  121<H>  08-03      Assessment:   60y Male current smoker, with no prior medical history admitted 7/29/21 with multiple episodes of near syncope and syncope. Orthostatics positive at time of presentation. Telemetry revealed sinus bradycardia with intermittent junctional rhythm and some episodes of dizziness correlating with bradycardia. CTA demonstrated significant CAD, and he underwent LHC confirming severe RCA disease s/p PCI via CHETAN x 2 to RCA. Despite revascularization he continued to have marked symptomatic sinus bradycardia, as well as chronotropic incompetence. He is now POD #1 status post uncomplicated Medtronic dual chamber pacemaker implant via left axillary venipuncture.      Plan:   Pt instructed as to ROM of affected arm - no lifting/pushing/pulling >10 lbs & shoulder ROM limited to 90deg & below x 4 weeks.   Pt instructed as to incision care and f/up - written instructions included in d/c documents.  Outpt f/up in 1-2 weeks - our office will contact pt to schedule.   Pending final read of CXR, no barriers to d/c from EP service perspective.   Will await final CXR read, but otherwise sign off as to EP. Please call EP service with questions, concerns or further arrhythmia issues.   Dispo per primary team.

## 2021-08-03 NOTE — PROGRESS NOTE ADULT - SUBJECTIVE AND OBJECTIVE BOX
[place] [facility]                                                               Office: [address] [address]                                                              Telephone: [phone]. Fax:[phone]                                                                             PROGRESS NOTE  Reason for follow up: CAD s/p PCI to the RCA   Overnight: No new events.   Update:   s/p PPM and noted to be doing well with no active complaints   Subjective: "  ______________________"      	  Vitals:  T(C): 36.6 (08-03-21 @ 07:53), Max: 36.6 (08-03-21 @ 01:05)  HR: 75 (08-03-21 @ 07:53) (69 - 75)  BP: 125/79 (08-03-21 @ 07:53) (114/71 - 159/91)  RR: 17 (08-03-21 @ 07:53) (14 - 17)  SpO2: 99% (08-03-21 @ 07:53) (96% - 100%)  Wt(kg): --  I&O's Summary    03 Aug 2021 07:01  -  03 Aug 2021 18:08  --------------------------------------------------------  IN: 290 mL / OUT: 0 mL / NET: 290 mL            PHYSICAL EXAM:  Appearance: Comfortable. No acute distress  HEENT:  Head and neck: Atraumatic. Normocephalic.  Normal oral mucosa, PERRL, Neck is supple. No JVD, No carotid bruit.   Neurologic: A & O x 3, no focal deficits. EOMI.  Lymphatic: No cervical lymphadenopathy  Cardiovascular: Normal S1 S2, No murmur, rubs/gallops. No JVD, No edema  Respiratory: Lungs clear to auscultation  Gastrointestinal:  Soft, Non-tender, + BS  Lower Extremities: No edema  Psychiatry: Patient is calm. No agitation. Mood & affect appropriate  Skin: No rashes/ ecchymoses/cyanosis/ulcers visualized on the face, hands or feet.      CURRENT MEDICATIONS:    atorvastatin  aspirin  chewable  clopidogrel Tablet      DIAGNOSTIC TESTING:  [ x] Echocardiogram:   < from: TTE Echo Complete w/o Contrast w/ Doppler (07.29.21 @ 13:36) >    Summary:   1. Normal left atrial size.   2. Color flow doppler and intravenous injection of agitated saline demonstrates the presence of an intact intra atrial septum.   3. Normal wall motion. Left ventricular ejection fraction, by visual estimation, is 65 to 70%. Grade I diastolic dysfunction.   4. Normal right atrial size.   5. Normal right ventricular size and function.   6. No significant valvular abnormality.   7. There is no evidence of pericardial effusion.    MD Shayy, RPVI Electronically signed on 7/29/2021 at 8:17:06 PM    < end of copied text >    [x ]  Catheterization:  < from: Cardiac Cath Lab (07.30.21 @ 17:04) >  CORONARY VESSELS: The coronary circulation is co-dominant.  LM:   --  LM: Normal.  LAD:   --  Mid LAD: There was a 40 % stenosis.  CX:   --  OM1: There was a 50 % stenosis.  RCA:   --  Mid RCA: There was a tubular 80 % stenosis.  --  Distal RCA: There was a diffuse 70 % stenosis.  COMPLICATIONS: No complications occurred during the cath lab visit.  DIAGNOSTIC IMPRESSIONS: iFR LAD- 0.95, iFR Circumflex - 0.98. PCI deferred.  Severe RCA disease. PCI with 2 CHETAN.  DIAGNOSTIC RECOMMENDATIONS: Aspirin and plavix.  Patient's bradycardia/syncope, appear to be out of proportion to CAD. Needs  continuedmonitoring during the weekend. If has recurrent symptoms, will  need further EP workup/management.  INTERVENTIONAL IMPRESSIONS: iFR LAD- 0.95, iFR Circumflex - 0.98. PCI  deferred.  Severe RCA disease. PCI with 2 CHETAN.  INTERVENTIONAL RECOMMENDATIONS:Aspirin and plavix.  Patient's bradycardia/syncope, appear to be out of proportion to CAD. Needs  continued monitoring during the weekend. If has recurrent symptoms, will  need further EP workup/management.  Prepared and signed by  Nando Belcher MD    < end of copied text >    [ ] Stress Test:    OTHER: 	      LABS:	 	                            13.4   6.08  )-----------( 237      ( 03 Aug 2021 05:20 )             39.4     08-03    139  |  104  |  13.0  ----------------------------<  109<H>  4.9   |  26.0  |  0.87    Ca    9.2      03 Aug 2021 05:20  Phos  4.1     08-02  Mg     1.7     08-03    TPro  6.2<L>  /  Alb  3.6  /  TBili  0.4  /  DBili  x   /  AST  35  /  ALT  24  /  AlkPhos  121<H>  08-03    proBNP:   Lipid Profile: Date: 07-29 @ 12:46  Total cholesterol 173; Direct LDL: --; HDL: 57; Triglycerides:71    HgA1c:   TSH: Thyroid Stimulating Hormone, Serum: 1.71 uIU/mL        TELEMETRY: Reviewed  A paced   ECG:  Reviewed by me.

## 2021-08-06 ENCOUNTER — NON-APPOINTMENT (OUTPATIENT)
Age: 61
End: 2021-08-06

## 2021-08-11 ENCOUNTER — APPOINTMENT (OUTPATIENT)
Dept: ELECTROPHYSIOLOGY | Facility: CLINIC | Age: 61
End: 2021-08-11

## 2021-08-18 ENCOUNTER — NON-APPOINTMENT (OUTPATIENT)
Age: 61
End: 2021-08-18

## 2021-08-18 ENCOUNTER — APPOINTMENT (OUTPATIENT)
Dept: ELECTROPHYSIOLOGY | Facility: CLINIC | Age: 61
End: 2021-08-18
Payer: COMMERCIAL

## 2021-08-18 VITALS
SYSTOLIC BLOOD PRESSURE: 122 MMHG | HEART RATE: 78 BPM | DIASTOLIC BLOOD PRESSURE: 78 MMHG | HEIGHT: 69 IN | WEIGHT: 146 LBS | TEMPERATURE: 98 F | BODY MASS INDEX: 21.62 KG/M2 | OXYGEN SATURATION: 99 %

## 2021-08-18 DIAGNOSIS — R40.20 UNSPECIFIED COMA: ICD-10-CM

## 2021-08-18 DIAGNOSIS — Z20.822 CONTACT WITH AND (SUSPECTED) EXPOSURE TO COVID-19: ICD-10-CM

## 2021-08-18 PROCEDURE — 93280 PM DEVICE PROGR EVAL DUAL: CPT

## 2021-08-18 PROCEDURE — 99024 POSTOP FOLLOW-UP VISIT: CPT

## 2021-08-18 PROCEDURE — 93000 ELECTROCARDIOGRAM COMPLETE: CPT | Mod: 59

## 2021-08-20 ENCOUNTER — APPOINTMENT (OUTPATIENT)
Dept: CARDIOLOGY | Facility: CLINIC | Age: 61
End: 2021-08-20
Payer: COMMERCIAL

## 2021-08-20 VITALS
BODY MASS INDEX: 21.62 KG/M2 | TEMPERATURE: 98.2 F | WEIGHT: 146 LBS | DIASTOLIC BLOOD PRESSURE: 80 MMHG | OXYGEN SATURATION: 96 % | RESPIRATION RATE: 17 BRPM | HEIGHT: 69 IN | HEART RATE: 86 BPM | SYSTOLIC BLOOD PRESSURE: 122 MMHG

## 2021-08-20 DIAGNOSIS — I49.8 OTHER SPECIFIED CARDIAC ARRHYTHMIAS: ICD-10-CM

## 2021-08-20 PROCEDURE — 99214 OFFICE O/P EST MOD 30 MIN: CPT

## 2021-08-23 PROBLEM — I49.8 JUNCTIONAL RHYTHM: Status: ACTIVE | Noted: 2021-08-18

## 2021-08-23 NOTE — REASON FOR VISIT
[Symptom and Test Evaluation] : symptom and test evaluation [Arrhythmia/ECG Abnorrmalities] : arrhythmia/ECG abnormalities [Coronary Artery Disease] : coronary artery disease [FreeTextEntry1] : 61 y/o M, active smoker, CAD (s/p PCI to the mRCA and dRCA; 40% mLAD, 50% LCx on 7/30/2021) and symptomatic bradycardia with chronotropic incompetence s/p Medtronic PPM on 8/2/2021) and dyslipidemia  presents for hospital follow up \par \par Patient notes that he has been doing well with no complaints of any chest pain or shortness of breath at rest or upon exertion and notes no syncopal episodes or presyncopal episodes. Patient has since then followed with Rose Santiago for device checks    no significant medical hx admitted for syncope and bradycardia with +CCTA; now s/p cardiac catheterization with intervention and CHETAN placement x2 to the right coronary artery\par \par LDL: 102

## 2021-08-23 NOTE — ASSESSMENT
[FreeTextEntry1] : 59 y/o M, active smoker, CAD (s/p PCI to the mRCA and dRCA; 40% mLAD, 50% LCx on 7/30/2021) and symptomatic bradycardia  with chronotropic incompetence s/p Medtronic PPM on 8/2/2021) and dyslipidemia presents for hospital follow up \par \par 1. CAD (s/p PCI to the  mRCA and dRCA; 40% mLAD, 50% LCx on 7/30/2021)\par - patient has stable CAD with no complaints of any chest pain or shortness of breath \par - EKG reviewed from EP visit and noted to be NSR @ 74 bpm, no changes compared to pre hospital discharge EKG \par - on ASA and Plavix \par \par 2. Symptomatic bradycardia  with chronotropic incompetence s/p Medtronic PPM on 8/2/2021\par Dual chamber Medtronic PPM interrogation reveals normal function. A, RV with adequate sense and pace thresholds. AP- 43.9%, -0.1%. One episode of AT. VS. ST in arrhythmia log, correlated with patient hurrying this am to get ready to come to office. \par - site looks good with no evidence of dehiscence, erythema or ecchymosis \par \par 3. Dyslipidemia \par - \par - continue with Lipitor 80mg po q daily \par \par Plan: \par - patient is cardiac stable and no complaints of any chest pain or shortness of breath \par - continue with ASA, Plavix and Lipitor \par - for hx of CAD will start Toprol XL 25mg po q daily \par - patient follows up with Dr. Rose in the office \par - will follow up in 4 months \par \par Zoey Herzog D.O. FAC\par Cardiology\par

## 2021-08-23 NOTE — CARDIOLOGY SUMMARY
[de-identified] : 8/18/2021: Sinus  Rhythm @ 74 bpm \par -  Negative precordial T-waves. [de-identified] : < from: TTE Echo Complete w/o Contrast w/ Doppler (07.29.21 @ 13:36) >\par \par Summary:\par  1. Normal left atrial size.\par  2. Color flow doppler and intravenous injection of agitated saline demonstrates the presence of an intact intra atrial septum.\par  3. Normal wall motion. Left ventricular ejection fraction, by visual estimation, is 65 to 70%. Grade I diastolic dysfunction.\par  4. Normal right atrial size.\par  5. Normal right ventricular size and function.\par  6. No significant valvular abnormality.\par  7. There is no evidence of pericardial effusion.\par  [de-identified] : < from: Cardiac Cath Lab (07.30.21 @ 17:04) >\par CORONARY VESSELS: The coronary circulation is co-dominant.\par LM:   --  LM: Normal.\par LAD:   --  Mid LAD: There was a 40 % stenosis.\par CX:   --  OM1: There was a 50 % stenosis.\par RCA:   --  Mid RCA: There was a tubular 80 % stenosis.\par --  Distal RCA: There was a diffuse 70 % stenosis.\par COMPLICATIONS: No complications occurred during the cath lab visit.\par DIAGNOSTIC IMPRESSIONS: iFR LAD- 0.95, iFR Circumflex - 0.98. PCI deferred.\par Severe RCA disease. PCI with 2 CHETAN.\par DIAGNOSTIC RECOMMENDATIONS: Aspirin and plavix.\par Patient's bradycardia/syncope, appear to be out of proportion to CAD. Needs\par continuedmonitoring during the weekend. If has recurrent symptoms, will\par need further EP workup/management.\par INTERVENTIONAL IMPRESSIONS: iFR LAD- 0.95, iFR Circumflex - 0.98. PCI\par deferred.\par Severe RCA disease. PCI with 2 CHETAN.\par INTERVENTIONAL RECOMMENDATIONS:Aspirin and plavix.\par Patient's bradycardia/syncope, appear to be out of proportion to CAD. Needs\par continued monitoring during the weekend. If has recurrent symptoms, will\par need further EP workup/management.\par \par

## 2021-08-23 NOTE — PHYSICAL EXAM
[Well Developed] : well developed [Well Nourished] : well nourished [No Acute Distress] : no acute distress [Normal Conjunctiva] : normal conjunctiva [Normal Venous Pressure] : normal venous pressure [No Carotid Bruit] : no carotid bruit [Normal S1, S2] : normal S1, S2 [No Murmur] : no murmur [No Rub] : no rub [Clear Lung Fields] : clear lung fields [No Gallop] : no gallop [Good Air Entry] : good air entry [No Respiratory Distress] : no respiratory distress  [Soft] : abdomen soft [Non Tender] : non-tender [No Masses/organomegaly] : no masses/organomegaly [Normal Bowel Sounds] : normal bowel sounds [Normal Gait] : normal gait [No Edema] : no edema [No Cyanosis] : no cyanosis [No Clubbing] : no clubbing [No Varicosities] : no varicosities [No Rash] : no rash [No Skin Lesions] : no skin lesions [Moves all extremities] : moves all extremities [No Focal Deficits] : no focal deficits [Normal Speech] : normal speech [Alert and Oriented] : alert and oriented [Normal memory] : normal memory [de-identified] : _ PPM generator in place with no evidence of ecchymosis or erythema

## 2021-08-24 ENCOUNTER — NON-APPOINTMENT (OUTPATIENT)
Age: 61
End: 2021-08-24

## 2021-08-27 ENCOUNTER — NON-APPOINTMENT (OUTPATIENT)
Age: 61
End: 2021-08-27

## 2021-08-27 ENCOUNTER — LABORATORY RESULT (OUTPATIENT)
Age: 61
End: 2021-08-27

## 2021-08-27 LAB
ANION GAP SERPL CALC-SCNC: 14 MMOL/L
BUN SERPL-MCNC: 10 MG/DL
CALCIUM SERPL-MCNC: 10.1 MG/DL
CHLORIDE SERPL-SCNC: 101 MMOL/L
CO2 SERPL-SCNC: 23 MMOL/L
CREAT SERPL-MCNC: 0.98 MG/DL
GLUCOSE SERPL-MCNC: 109 MG/DL
POTASSIUM SERPL-SCNC: 5.3 MMOL/L
SODIUM SERPL-SCNC: 138 MMOL/L

## 2021-08-30 LAB
BASOPHILS # BLD AUTO: 0.05 K/UL
BASOPHILS NFR BLD AUTO: 0.7 %
EOSINOPHIL # BLD AUTO: 0.33 K/UL
EOSINOPHIL NFR BLD AUTO: 4.7 %
HCT VFR BLD CALC: 41.2 %
HGB BLD-MCNC: 14.5 G/DL
IMM GRANULOCYTES NFR BLD AUTO: 0.1 %
LYMPHOCYTES # BLD AUTO: 1.66 K/UL
LYMPHOCYTES NFR BLD AUTO: 23.5 %
MAN DIFF?: NORMAL
MCHC RBC-ENTMCNC: 35.2 GM/DL
MCHC RBC-ENTMCNC: 37.7 PG
MCV RBC AUTO: 107 FL
MONOCYTES # BLD AUTO: 0.8 K/UL
MONOCYTES NFR BLD AUTO: 11.3 %
NEUTROPHILS # BLD AUTO: 4.22 K/UL
NEUTROPHILS NFR BLD AUTO: 59.7 %
PLATELET # BLD AUTO: 197 K/UL
RBC # BLD: 3.85 M/UL
RBC # FLD: 15 %
SARS-COV-2 N GENE NPH QL NAA+PROBE: NOT DETECTED
WBC # FLD AUTO: 7.07 K/UL

## 2021-09-13 ENCOUNTER — RX CHANGE (OUTPATIENT)
Age: 61
End: 2021-09-13

## 2021-10-01 NOTE — REVIEW OF SYSTEMS
[Headache] : no headache [Feeling Fatigued] : not feeling fatigued [SOB] : no shortness of breath [Dyspnea on exertion] : not dyspnea during exertion [Chest Discomfort] : no chest discomfort [Lower Ext Edema] : no extremity edema [Palpitations] : no palpitations [Syncope] : no syncope [Dizziness] : no dizziness

## 2021-10-01 NOTE — PROCEDURE
[No] : not [NSR] : normal sinus rhythm [Pacemaker] : pacemaker [Threshold Testing Performed] : Threshold testing was performed [Lead Imp:  ___ohms] : lead impedance was [unfilled] ohms [Sensing Amplitude ___mv] : sensing amplitude was [unfilled] mv [___V @] : [unfilled] V [___ ms] : [unfilled] ms [de-identified] : Medtronic  [de-identified] : Sharyn  [de-identified] : [id number] [de-identified] : 08/02/21 [de-identified] : FRANCOIS [de-identified] : 70 [de-identified] : AP- 43.9%, - 0.1%

## 2021-10-01 NOTE — DISCUSSION/SUMMARY
[FreeTextEntry1] : 60y male current smoker, with no prior medical history admitted 7/29/21 with multiple episodes of near syncope and syncope. Orthostatics positive at time of presentation. Telemetry revealed sinus bradycardia with intermittent junctional rhythm and some episodes of dizziness correlating with bradycardia. CTA\par demonstrated significant CAD, and he underwent LHC confirming severe RCA disease s/p PCI via CHETAN x 2 to RCA. Despite revascularization he continued to have marked symptomatic sinus bradycardia, as well as chronotropic incompetence. He is now status post uncomplicated Medtronic dual chamber\par pacemaker implant via left axillary venipuncture ON 8/2/21.\par \par Since discharge from hospital he reports he has been feeling well. Denies lightheadedness, dizziness, chest pain, shortness of breath.  Left infraclavicular incision well approximated without erythema, edema, or exudate. Pocket without hematoma.\par \par Dual chamber Medtronic PPM interrogation reveals normal function. A, RV with adequate sense and pace thresholds. AP- 43.9%, -0.1%. One episode of AT. VS. ST in arrhythmia log, correlated with patient hurrying this am to get ready to come to office. Denies palpitations. \par \par Recommendation:\par \par -Site care, post op arm restrictions and remote follow up reviewed. To return for EP follow up in 3 months for device follow up chronic settings.\par -F/u with Dr. Herzog this week as scheduled.\par \par Malathi Alarcon ANP-C

## 2021-10-01 NOTE — HISTORY OF PRESENT ILLNESS
[de-identified] : 60y male current smoker, with no prior medical history admitted 7/29/21 with multiple episodes of near syncope and syncope. Orthostatics positive at time of presentation. Telemetry revealed sinus bradycardia with intermittent junctional rhythm and some episodes of dizziness correlating with bradycardia. CTA\par demonstrated significant CAD, and he underwent LHC confirming severe RCA disease s/p PCI via CHETAN x 2 to RCA. Despite revascularization he continued to have marked symptomatic sinus bradycardia, as well as chronotropic incompetence. He is now status post uncomplicated Medtronic dual chamber\par pacemaker implant via left axillary venipuncture ON 8/2/21.\par \par Since discharge from hospital he reports he has been feeling well. Denies lightheadedness, dizziness, chest pain, shortness of breath.  Left infraclavicular incision well approximated without erythema, edema, or exudate. Pocket without hematoma.\par \par Dual chamber Medtronic PPM interrogation reveals normal function. A, RV with adequate sense and pace thresholds. AP- 43.9%, -0.1%. One episode of AT. VS. ST in arrhythmia log, correlated with patient hurrying this am to get ready to come to office. Denies palpitations. \par

## 2021-10-01 NOTE — PHYSICAL EXAM
[General Appearance - Well Developed] : well developed [General Appearance - Well Nourished] : well nourished [Heart Rate And Rhythm] : heart rate and rhythm were normal [Heart Sounds] : normal S1 and S2 [Murmurs] : no murmurs present [Arterial Pulses Normal] : the arterial pulses were normal [] : no respiratory distress [Left Infraclavicular] : left infraclavicular area [Clean] : clean [Dry] : dry [Healing Well] : healing well [Erythema] : not erythematous [Warm] : not warm [Tender] : not tender

## 2021-11-10 NOTE — ED PROVIDER NOTE - PRINCIPAL DIAGNOSIS
Orthostatic hypotension [Follow-Up - Clinic] : a clinic follow-up of [Chest Pain] : chest pain [Hypertension] : hypertension

## 2021-11-23 ENCOUNTER — NON-APPOINTMENT (OUTPATIENT)
Age: 61
End: 2021-11-23

## 2021-11-23 ENCOUNTER — APPOINTMENT (OUTPATIENT)
Dept: ELECTROPHYSIOLOGY | Facility: CLINIC | Age: 61
End: 2021-11-23
Payer: COMMERCIAL

## 2021-11-23 PROCEDURE — 93294 REM INTERROG EVL PM/LDLS PM: CPT

## 2021-11-23 PROCEDURE — 93296 REM INTERROG EVL PM/IDS: CPT

## 2021-11-24 ENCOUNTER — APPOINTMENT (OUTPATIENT)
Dept: ELECTROPHYSIOLOGY | Facility: CLINIC | Age: 61
End: 2021-11-24
Payer: COMMERCIAL

## 2021-11-24 ENCOUNTER — NON-APPOINTMENT (OUTPATIENT)
Age: 61
End: 2021-11-24

## 2021-11-24 VITALS
DIASTOLIC BLOOD PRESSURE: 84 MMHG | SYSTOLIC BLOOD PRESSURE: 134 MMHG | HEIGHT: 69.5 IN | OXYGEN SATURATION: 96 % | TEMPERATURE: 98.2 F | HEART RATE: 75 BPM | WEIGHT: 145 LBS | RESPIRATION RATE: 16 BRPM | BODY MASS INDEX: 20.99 KG/M2

## 2021-11-24 DIAGNOSIS — R00.1 BRADYCARDIA, UNSPECIFIED: ICD-10-CM

## 2021-11-24 PROCEDURE — 93280 PM DEVICE PROGR EVAL DUAL: CPT

## 2021-11-24 PROCEDURE — 99214 OFFICE O/P EST MOD 30 MIN: CPT | Mod: 25

## 2021-11-24 PROCEDURE — 93000 ELECTROCARDIOGRAM COMPLETE: CPT | Mod: 59

## 2021-12-01 NOTE — REVIEW OF SYSTEMS
[Palpitations] : palpitations [Headache] : no headache [Feeling Fatigued] : not feeling fatigued [SOB] : no shortness of breath [Dyspnea on exertion] : not dyspnea during exertion [Chest Discomfort] : no chest discomfort [Lower Ext Edema] : no extremity edema [Orthopnea] : no orthopnea [Syncope] : no syncope [Dizziness] : no dizziness [Easy Bleeding] : no tendency for easy bleeding

## 2021-12-01 NOTE — DISCUSSION/SUMMARY
[FreeTextEntry1] : 61y male current smoker, with no prior medical history admitted 7/29/21 with multiple episodes of near syncope and syncope. Orthostatics positive at time of presentation. Telemetry revealed sinus bradycardia with intermittent junctional rhythm and some episodes of dizziness correlating with bradycardia. CTA\par demonstrated significant CAD, and he underwent LHC confirming severe RCA disease s/p PCI via CHETAN x 2 to RCA. Despite revascularization he continued to have marked symptomatic sinus bradycardia, as well as chronotropic incompetence. He is now status post uncomplicated Medtronic dual chamber\par pacemaker implant via left axillary venipuncture ON 8/2/21.\par \par Beta blocker therapy was initiated on 8/20/21. On 8/24/21 he called to report fatigue, lightheadedness, and metoprolol was held. Three days later he presented to office off metoprolol c/o fever, chills, ARREOLA.  Covid testing and CBC were WNL. Symptoms subsequently resolved. \par \par Today, he reports he has been feeling well since last follow up. Denies lightheadedness, dizziness, near syncope, syncope. Dual chamber PPM interrogation reveals normal function. Brief episodes of likely ST in arrhythmia log as well as rare brief SVT 2-3 seconds in duration. Chronic settings programmed.\par \par Recommendation: \par \par -Dual chamber PPM with normal function. Chronic settings programmed. Remote monitoring in place. Brief SVT in arrhythmia log and history of CAD. Beta blocker was held after possible side effects developed however he also had symptoms 3 days later off metoprolol of fever, chills. Suspect could have been viral etiology. Discussed a retrial of metoprolol 25 mg QHS. He is willing to try and advised if he develops any side effects he may discontinue medication and notify office.\par -EP follow up annually or sooner PRN, to continue cardiology f/u with Dr. Herzog\par \par Malathi Alarcon ANP-C

## 2021-12-01 NOTE — HISTORY OF PRESENT ILLNESS
[de-identified] : 61y male current smoker, with no prior medical history admitted 7/29/21 with multiple episodes of near syncope and syncope. Orthostatics positive at time of presentation. Telemetry revealed sinus bradycardia with intermittent junctional rhythm and some episodes of dizziness correlating with bradycardia. CTA\par demonstrated significant CAD, and he underwent LHC confirming severe RCA disease s/p PCI via CHETAN x 2 to RCA. Despite revascularization he continued to have marked symptomatic sinus bradycardia, as well as chronotropic incompetence. He is now status post uncomplicated Medtronic dual chamber\par pacemaker implant via left axillary venipuncture ON 8/2/21.\par \par Beta blocker therapy was initiated on 8/20/21. On 8/24/21 he called to report fatigue, lightheadedness, and metoprolol was held. Three days later he presented to office off metoprolol c/o fever, chills, ARREOLA.  Covid testing and CBC were WNL. Symptoms subsequently resolved. \par \par Today, he reports he has been feeling well since last follow up. Denies lightheadedness, dizziness, near syncope, syncope. Dual chamber PPM interrogation reveals normal function. Brief episodes of likely ST in arrhythmia log as well as rare brief SVT 2-3 seconds in duration. Chronic settings programmed.\par \[facility] Ref: Dr. Herzog \[facility]

## 2021-12-01 NOTE — END OF VISIT
[Time Spent: ___ minutes] : I have spent [unfilled] minutes of time on the encounter. [FreeTextEntry3] : I saw and examined the patient and agree with the note as documented.\par

## 2021-12-13 ENCOUNTER — TRANSCRIPTION ENCOUNTER (OUTPATIENT)
Age: 61
End: 2021-12-13

## 2021-12-17 ENCOUNTER — APPOINTMENT (OUTPATIENT)
Dept: CARDIOLOGY | Facility: CLINIC | Age: 61
End: 2021-12-17

## 2022-02-22 ENCOUNTER — APPOINTMENT (OUTPATIENT)
Dept: ELECTROPHYSIOLOGY | Facility: CLINIC | Age: 62
End: 2022-02-22
Payer: COMMERCIAL

## 2022-02-22 ENCOUNTER — NON-APPOINTMENT (OUTPATIENT)
Age: 62
End: 2022-02-22

## 2022-02-22 PROCEDURE — 93294 REM INTERROG EVL PM/LDLS PM: CPT

## 2022-02-22 PROCEDURE — 93296 REM INTERROG EVL PM/IDS: CPT

## 2022-04-25 ENCOUNTER — EMERGENCY (EMERGENCY)
Facility: HOSPITAL | Age: 62
LOS: 1 days | Discharge: HOME | End: 2022-04-25
Attending: EMERGENCY MEDICINE
Payer: COMMERCIAL

## 2022-04-25 VITALS
WEIGHT: 149.91 LBS | HEART RATE: 83 BPM | SYSTOLIC BLOOD PRESSURE: 112 MMHG | HEIGHT: 70 IN | OXYGEN SATURATION: 98 % | RESPIRATION RATE: 18 BRPM | TEMPERATURE: 99 F | DIASTOLIC BLOOD PRESSURE: 82 MMHG

## 2022-04-25 DIAGNOSIS — Z98.890 OTHER SPECIFIED POSTPROCEDURAL STATES: Chronic | ICD-10-CM

## 2022-04-25 PROCEDURE — 71250 CT THORAX DX C-: CPT | Mod: 26,MA

## 2022-04-25 PROCEDURE — 99284 EMERGENCY DEPT VISIT MOD MDM: CPT

## 2022-04-25 PROCEDURE — 71250 CT THORAX DX C-: CPT | Mod: MA

## 2022-04-25 PROCEDURE — 99284 EMERGENCY DEPT VISIT MOD MDM: CPT | Mod: 25

## 2022-04-25 RX ORDER — CLARITHROMYCIN 500 MG
2 TABLET ORAL
Qty: 6 | Refills: 0
Start: 2022-04-25 | End: 2022-04-29

## 2022-04-25 NOTE — ED PROVIDER NOTE - CLINICAL SUMMARY MEDICAL DECISION MAKING FREE TEXT BOX
Pt with posterior non traumatic R rib pain  pe as doc  CT chest non con ordered to further eval as pt is smoker  Pt declines pain meds at this time

## 2022-04-25 NOTE — ED ADULT TRIAGE NOTE - CHIEF COMPLAINT QUOTE
Pt states spontaneous R posterior rib pain x1 month which resolved and has now returned with associated chills.

## 2022-04-25 NOTE — ED PROVIDER NOTE - NSFOLLOWUPINSTRUCTIONS_ED_ALL_ED_FT
Zithromax 2 pills day one then 1 pill once a day for 4 more days  Make sure you follow up with your doctor for a repeat chest xray

## 2022-04-25 NOTE — ED PROVIDER NOTE - PATIENT PORTAL LINK FT
You can access the FollowMyHealth Patient Portal offered by [facility] by registering at the following website: [email or web address]. By joining [email or web address]’s FollowMyHealth portal, you will also be able to view your health information using other applications (apps) compatible with our system.

## 2022-04-25 NOTE — ED PROVIDER NOTE - OBJECTIVE STATEMENT
60 yo male with c/o posterior R rib pain. Had in past but went away until recently and now  is increased  Worse with sleeping R side and deep inspiration. No trauma. No fever No cough  Med hx + cigarettes, six pack of beer a day, + card stents  NKA  declines pain meds at this time

## 2022-05-24 ENCOUNTER — APPOINTMENT (OUTPATIENT)
Dept: ELECTROPHYSIOLOGY | Facility: CLINIC | Age: 62
End: 2022-05-24
Payer: COMMERCIAL

## 2022-05-24 ENCOUNTER — NON-APPOINTMENT (OUTPATIENT)
Age: 62
End: 2022-05-24

## 2022-05-24 PROCEDURE — 93296 REM INTERROG EVL PM/IDS: CPT

## 2022-05-24 PROCEDURE — 93294 REM INTERROG EVL PM/LDLS PM: CPT

## 2022-07-06 ENCOUNTER — NON-APPOINTMENT (OUTPATIENT)
Age: 62
End: 2022-07-06

## 2022-08-03 ENCOUNTER — EMERGENCY (EMERGENCY)
Facility: HOSPITAL | Age: 62
LOS: 1 days | Discharge: HOME | End: 2022-08-03
Attending: EMERGENCY MEDICINE
Payer: COMMERCIAL

## 2022-08-03 VITALS
RESPIRATION RATE: 20 BRPM | TEMPERATURE: 99 F | WEIGHT: 149.69 LBS | HEART RATE: 86 BPM | HEIGHT: 70 IN | DIASTOLIC BLOOD PRESSURE: 101 MMHG | OXYGEN SATURATION: 94 % | SYSTOLIC BLOOD PRESSURE: 158 MMHG

## 2022-08-03 DIAGNOSIS — Z98.890 OTHER SPECIFIED POSTPROCEDURAL STATES: Chronic | ICD-10-CM

## 2022-08-03 PROBLEM — I25.10 ATHEROSCLEROTIC HEART DISEASE OF NATIVE CORONARY ARTERY WITHOUT ANGINA PECTORIS: Chronic | Status: ACTIVE | Noted: 2022-04-25

## 2022-08-03 PROCEDURE — 99283 EMERGENCY DEPT VISIT LOW MDM: CPT | Mod: 25

## 2022-08-03 PROCEDURE — 12001 RPR S/N/AX/GEN/TRNK 2.5CM/<: CPT

## 2022-08-03 PROCEDURE — 90715 TDAP VACCINE 7 YRS/> IM: CPT

## 2022-08-03 PROCEDURE — 12011 RPR F/E/E/N/L/M 2.5 CM/<: CPT

## 2022-08-03 PROCEDURE — 90471 IMMUNIZATION ADMIN: CPT

## 2022-08-03 RX ORDER — TETANUS TOXOID, REDUCED DIPHTHERIA TOXOID AND ACELLULAR PERTUSSIS VACCINE, ADSORBED 5; 2.5; 8; 8; 2.5 [IU]/.5ML; [IU]/.5ML; UG/.5ML; UG/.5ML; UG/.5ML
0.5 SUSPENSION INTRAMUSCULAR ONCE
Refills: 0 | Status: COMPLETED | OUTPATIENT
Start: 2022-08-03 | End: 2022-08-03

## 2022-08-03 RX ADMIN — TETANUS TOXOID, REDUCED DIPHTHERIA TOXOID AND ACELLULAR PERTUSSIS VACCINE, ADSORBED 0.5 MILLILITER(S): 5; 2.5; 8; 8; 2.5 SUSPENSION INTRAMUSCULAR at 16:46

## 2022-08-03 NOTE — ED PROVIDER NOTE - CLINICAL SUMMARY MEDICAL DECISION MAKING FREE TEXT BOX
61yM with pmh htn, pacemaker for symptomatic bradycardia, CAD s/p stentsx2 presenting with head laceration. Neuro intact and rest of physcial exam is benign with exception of ~2cm parietal laceration. Update tetanus and lac repair

## 2022-08-03 NOTE — ED PROVIDER NOTE - ATTENDING CONTRIBUTION TO CARE
cabinet came off wall, fell ~4 ft and struck the top of his head., 2 cm laceration to scalp  plan lac repair

## 2022-08-03 NOTE — ED PROVIDER NOTE - OBJECTIVE STATEMENT
61yM with pmh htn, pacemaker for symptomatic bradycardia, CAD s/p stentsx2 presenting with head laceration. Reports cabinet came off wall, fell ~4 ft and struck the top of his head. Denies LOC. Is on plavix for CAD. Denies headache, nausea, vomiting, changes in vision, abnormal gait, numbness/weakness in arms.

## 2022-08-03 NOTE — ED PROVIDER NOTE - PATIENT PORTAL LINK FT
You can access the FollowMyHealth Patient Portal offered by [facility] by registering at the following website: [email or web address]. By joining [employer]’s FollowMyHealth portal, you will also be able to view your health information using other applications (apps) compatible with our system.

## 2022-08-03 NOTE — ED ADULT NURSE NOTE - OBJECTIVE STATEMENT
Assumed care of patient in pr-b. Pt a&ox4 rr even and unlabored presents to ed after cabinet fell on head from wall. Pt denies loc, bleeding control prior to arrival. Pt verbalizes is on a blood thinner but does not remember the name. Pt denies chest pain, sob, fevers, chills. pt educated on plan of care, pt able to successfully teach back plan of care to RN, RN will continue to reeducate pt during hospital stay.

## 2022-08-03 NOTE — ED PROVIDER NOTE - PHYSICAL EXAMINATION
Gen: no acute distress  Head: normocephalic, 2cm laceration left parietal; non-bleeding  EENT: EOMI, PERRLA, no c-spine tenderness  Respiratory: no increased work of breathing  MSK: No edema, no visible deformities, full range of motion in all 4 extremities  Neuro: CNII-XII intact, UE: 5/5 strength throughout b/l, LE: 5/5 strength throughout b/l, no dysmetria on FTN; normal sensation intact b/l, gait non-ataxic  Skin: 2cm laceration left parietal; non-bleeding

## 2022-08-03 NOTE — ED PROVIDER NOTE - NSFOLLOWUPINSTRUCTIONS_ED_ALL_ED_FT
- Please return in 10 days for staple removal  - Your tetanus was updated  - You may take tylenol or advil for the pain    SEEK IMMEDIATE MEDICAL CARE IF YOU HAVE ANY OF THE FOLLOWING SYMPTOMS: fever, vomiting, stiff neck, loss of vision, problems with speech, muscle weakness, loss of balance, trouble walking, passing out, or confusion.      SEEK IMMEDIATE MEDICAL CARE IF YOU HAVE ANY OF THE FOLLOWING SYMPTOMS: swelling around the wound, worsening pain, drainage from the wound, red streaking going away from your wound, inability to move finger or toe near the laceration, or discoloration of skin near the laceration.

## 2022-08-03 NOTE — ED ADULT TRIAGE NOTE - CHIEF COMPLAINT QUOTE
61M nad, aaox3 c/o lac to top of head s/p cabinet falling off wall striking his head. Pt denies loc, bleeding controlled. Pt does not know name of medication he takes but reports is blood thinner.

## 2022-08-09 ENCOUNTER — EMERGENCY (EMERGENCY)
Facility: HOSPITAL | Age: 62
LOS: 1 days | Discharge: HOME | End: 2022-08-09
Attending: EMERGENCY MEDICINE
Payer: COMMERCIAL

## 2022-08-09 VITALS
TEMPERATURE: 98 F | OXYGEN SATURATION: 96 % | SYSTOLIC BLOOD PRESSURE: 144 MMHG | RESPIRATION RATE: 18 BRPM | HEIGHT: 70 IN | DIASTOLIC BLOOD PRESSURE: 84 MMHG | HEART RATE: 84 BPM

## 2022-08-09 DIAGNOSIS — Z98.890 OTHER SPECIFIED POSTPROCEDURAL STATES: Chronic | ICD-10-CM

## 2022-08-09 PROCEDURE — G0463: CPT

## 2022-08-09 PROCEDURE — 99283 EMERGENCY DEPT VISIT LOW MDM: CPT

## 2022-08-09 RX ORDER — NEOMYCIN/POLYMYXIN B/HYDROCORT
4 SUSPENSION, DROPS(FINAL DOSAGE FORM)(ML) OTIC (EAR)
Qty: 1 | Refills: 0
Start: 2022-08-09 | End: 2022-08-15

## 2022-08-09 NOTE — ED PROVIDER NOTE - ATTENDING APP SHARED VISIT CONTRIBUTION OF CARE
here for staple removal   also co ear pain l ear and hearingl oss over one month  asks for ent referral  pe no tm abnormalities but min swelling canal   will try drops and ent referral  lac closed dc staples  no hair washing three more days  agree w care

## 2022-08-09 NOTE — ED ADULT NURSE NOTE - NSIMPLEMENTINTERV_GEN_ALL_ED
Implemented All Universal Safety Interventions:  [place] to call system. Call bell, personal items and telephone within reach. Instruct patient to call for assistance. Room bathroom lighting operational. Non-slip footwear when patient is off stretcher. Physically safe environment: no spills, clutter or unnecessary equipment. Stretcher in lowest position, wheels locked, appropriate side rails in place.

## 2022-08-09 NOTE — CHART NOTE - NSCHARTNOTEFT_GEN_A_CORE
Patient has appointment with Dr. Christiansen on 8/16/22 at 1:30pm  ENT  [address] [address]  Phone [phone]

## 2022-08-09 NOTE — ED PROVIDER NOTE - NSFOLLOWUPINSTRUCTIONS_ED_ALL_ED_FT

## 2022-08-09 NOTE — ED PROVIDER NOTE - PATIENT PORTAL LINK FT
You can access the FollowMyHealth Patient Portal offered by [facility] by registering at the following website: [email or web address]. By joining [employer]’s FollowMyHealth portal, you will also be able to view your health information using other applications (apps) compatible with our system.

## 2022-08-09 NOTE — ED PROVIDER NOTE - NS ED ATTENDING STATEMENT MOD
This was a shared visit with the BOSSMAN. I reviewed and verified the documentation and independently performed the documented:

## 2022-08-09 NOTE — ED PROCEDURE NOTE - CPROC ED POST PROC CARE GUIDE1
Verbal/written post procedure instructions were given to patient/caregiver./Instructed patient/caregiver to follow-up with primary care physician./Instructed patient/caregiver regarding signs and symptoms of infection. Patient is a 1 y/o M with no PMH presenting with vomiting and diarrhea x 3 days with subsequent decreased urine output and decreased po. Patient was in normal state of health until developing rhinorrhea and congestion 1 week ago with fevers, tmax 101. Mother was managing symptoms at home with motrin, with intermittent resolution of fever. Patient then had 5-6 episodes of NBNB emesis 3 days ago and was not tolerating po. 2 days ago, patient developed multiple episodes of watery non-bloody diarrhea. Per mother, patient's last episode of diarrhea or emesis was 2 days ago. Patient had 1 wet diaper total yesterday, and has had 1 wet diaper today. Patient last po was 2 sips of water yesterday. Mother reports patient was evaluated by pediatrician, Dr. Hardy, yesterday due to decreased po and decreased urine output, and was referred to ED for further management. Patient was seen in [place] ED, and was noted to have on BMP, HCO3 of 18 and Dstick of 50. Patient given 2 total boluses, 1 D25NS and 1 NS, with final HCO3 of 13 and dstick of 157 and transport dstick of 107 after 1 D10NS bolus. Patient was given motrin for fever in ED, with last dose approximately at 5 AM this morning. Patient transferred to [facility] for further management. No sick contacts, but patient in , and family with similar symptoms since patient developed symptoms. No recent travel.

## 2022-08-09 NOTE — ED PROVIDER NOTE - CLINICAL SUMMARY MEDICAL DECISION MAKING FREE TEXT BOX
61M with 3 staples placed 3 days ago. Staples removed. 61M with 3 staples placed 3 days ago. Staples removed. Pt tolerated well. Of note, pt with left ear pain otitis externa. Rx for cortisporin sent. Pt to FU with PCP. 61M with 3 staples placed 7 days ago. Staples removed. Pt tolerated well. Of note, pt with left ear pain otitis externa. Rx for cortisporin sent. Pt to FU with PCP. Discussed ED return precautions.

## 2022-08-09 NOTE — ED PROVIDER NOTE - CARE PROVIDER_API CALL
Jeffry Christiansen)  [facility]  [address] [place] [address]  Phone: [phone]  Fax: [phone]  Follow Up Time:

## 2022-08-09 NOTE — ED PROVIDER NOTE - OBJECTIVE STATEMENT
61M with pmh htn, pacemaker for symptomatic bradycardia, CAD s/p stentsx2 presenting with 3 staples placed 7 days ago after a cabit fell off the wall. 3 staples placed to occipital head 7 days ago. Denies headaches, dizziness, blurry vision, pain. 61M with pmh htn, pacemaker for symptomatic bradycardia, CAD s/p stentsx2 presenting with 3 staples placed 7 days ago after a cabinet fell off the wall. 3 staples placed to occipital head 7 days ago. Denies headaches, dizziness, blurry vision, pain.

## 2022-08-16 ENCOUNTER — APPOINTMENT (OUTPATIENT)
Dept: OTOLARYNGOLOGY | Facility: CLINIC | Age: 62
End: 2022-08-16
Payer: COMMERCIAL

## 2022-08-16 VITALS — BODY MASS INDEX: 21.48 KG/M2 | HEIGHT: 69 IN | WEIGHT: 145 LBS

## 2022-08-16 DIAGNOSIS — Z78.9 OTHER SPECIFIED HEALTH STATUS: ICD-10-CM

## 2022-08-16 PROCEDURE — 99204 OFFICE O/P NEW MOD 45 MIN: CPT | Mod: 25

## 2022-08-16 PROCEDURE — 69210 REMOVE IMPACTED EAR WAX UNI: CPT

## 2022-08-16 RX ORDER — FLUTICASONE PROPIONATE 50 UG/1
50 SPRAY, METERED NASAL
Qty: 16 | Refills: 0 | Status: DISCONTINUED | COMMUNITY
Start: 2022-07-07

## 2022-08-16 RX ORDER — AMOXICILLIN AND CLAVULANATE POTASSIUM 875; 125 MG/1; MG/1
875-125 TABLET, COATED ORAL
Qty: 20 | Refills: 0 | Status: COMPLETED | COMMUNITY
Start: 2022-07-07

## 2022-08-16 RX ORDER — AZITHROMYCIN 250 MG/1
250 TABLET, FILM COATED ORAL
Qty: 6 | Refills: 0 | Status: COMPLETED | COMMUNITY
Start: 2022-04-25

## 2022-08-16 NOTE — PHYSICAL EXAM
[de-identified] : Hair in his left eac along his TM which was removed with microscope instrumentation. [Nasal Endoscopy Performed] : nasal endoscopy was performed, see procedure section for findings [Normal] : mucosa is normal

## 2022-08-16 NOTE — ASSESSMENT
[FreeTextEntry1] : 61M w 6 weeks of pressure in his left ear, intermittent tickling in left ear canal. Reports nasal congestion. No retraction on exam. 2 hairs in EAC were removed with microscope and instrumentation.

## 2022-08-16 NOTE — HISTORY OF PRESENT ILLNESS
[de-identified] : 61 year old male presents for an initial evaluation for left ear pressure. States has had left pressure and constant left tinnitus for the last 6 weeks, went to urgent care was given Amoxicillin with no relief, had to have staples removed from his head and advised Dr Morrissey of left ear discomfort was given neomycin otic drops with no relief, last administration was this morning. States currently still feels left ear pressure, constant tinnitus, has about 50% of hearing in left ear, and intermittent headaches. Patient denies otalgia, otorrhea, ear infections, fevers, dizziness, or vertigo. Current smoker, smokes 1-2 cigarettes per day, and consumes alcohol occasionally. Also reports tickling in his left ear on occasion. \par

## 2022-08-16 NOTE — PROCEDURE
[FreeTextEntry6] : Inferior turbinates mildly hypertrophied bilaterally, middle meatus clear bilaterally, SER clear bilaterally, nasopharynx with hyperemia, but fossa of rosenmullar clear bilaterally, prominent torus but eustachian tubes patent without obstruction. [de-identified] : BOT clear without masses or lesions, vallecula clear, pyriforms clear, post cricoid clear, TVFs mobile bilaterally, epiglottis crisp, no masses or lesions. [] : Removal of Cerumen

## 2022-08-18 ENCOUNTER — EMERGENCY (EMERGENCY)
Facility: HOSPITAL | Age: 62
LOS: 1 days | Discharge: HOME | End: 2022-08-18
Attending: STUDENT IN AN ORGANIZED HEALTH CARE EDUCATION/TRAINING PROGRAM
Payer: COMMERCIAL

## 2022-08-18 VITALS
SYSTOLIC BLOOD PRESSURE: 136 MMHG | RESPIRATION RATE: 16 BRPM | TEMPERATURE: 98 F | WEIGHT: 145.06 LBS | HEIGHT: 70 IN | OXYGEN SATURATION: 99 % | HEART RATE: 80 BPM | DIASTOLIC BLOOD PRESSURE: 91 MMHG

## 2022-08-18 VITALS
HEART RATE: 82 BPM | RESPIRATION RATE: 18 BRPM | TEMPERATURE: 98 F | SYSTOLIC BLOOD PRESSURE: 140 MMHG | OXYGEN SATURATION: 98 % | DIASTOLIC BLOOD PRESSURE: 82 MMHG

## 2022-08-18 DIAGNOSIS — Z98.890 OTHER SPECIFIED POSTPROCEDURAL STATES: Chronic | ICD-10-CM

## 2022-08-18 LAB
ALBUMIN SERPL ELPH-MCNC: 3.9 G/DL — SIGNIFICANT CHANGE UP (ref 3.3–5.2)
ALP SERPL-CCNC: 180 U/L — HIGH (ref 40–120)
ALT FLD-CCNC: 34 U/L — SIGNIFICANT CHANGE UP
ANION GAP SERPL CALC-SCNC: 13 MMOL/L — SIGNIFICANT CHANGE UP (ref 5–17)
APTT BLD: 27.3 SEC — LOW (ref 27.5–35.5)
AST SERPL-CCNC: 78 U/L — HIGH
BASOPHILS # BLD AUTO: 0.04 K/UL — SIGNIFICANT CHANGE UP (ref 0–0.2)
BASOPHILS NFR BLD AUTO: 0.7 % — SIGNIFICANT CHANGE UP (ref 0–2)
BILIRUB SERPL-MCNC: 1.3 MG/DL — SIGNIFICANT CHANGE UP (ref 0.4–2)
BUN SERPL-MCNC: 4.8 MG/DL — LOW (ref 8–20)
CALCIUM SERPL-MCNC: 9.3 MG/DL — SIGNIFICANT CHANGE UP (ref 8.4–10.5)
CHLORIDE SERPL-SCNC: 101 MMOL/L — SIGNIFICANT CHANGE UP (ref 98–107)
CK SERPL-CCNC: 117 U/L — SIGNIFICANT CHANGE UP (ref 30–200)
CO2 SERPL-SCNC: 24 MMOL/L — SIGNIFICANT CHANGE UP (ref 22–29)
CREAT SERPL-MCNC: 0.72 MG/DL — SIGNIFICANT CHANGE UP (ref 0.5–1.3)
EGFR: 104 ML/MIN/1.73M2 — SIGNIFICANT CHANGE UP
EOSINOPHIL # BLD AUTO: 0.09 K/UL — SIGNIFICANT CHANGE UP (ref 0–0.5)
EOSINOPHIL NFR BLD AUTO: 1.7 % — SIGNIFICANT CHANGE UP (ref 0–6)
GLUCOSE SERPL-MCNC: 72 MG/DL — SIGNIFICANT CHANGE UP (ref 70–99)
HCT VFR BLD CALC: 36.4 % — LOW (ref 39–50)
HGB BLD-MCNC: 13.2 G/DL — SIGNIFICANT CHANGE UP (ref 13–17)
IMM GRANULOCYTES NFR BLD AUTO: 0.2 % — SIGNIFICANT CHANGE UP (ref 0–1.5)
INR BLD: 1.13 RATIO — SIGNIFICANT CHANGE UP (ref 0.88–1.16)
LACTATE BLDV-MCNC: 2 MMOL/L — SIGNIFICANT CHANGE UP (ref 0.5–2)
LYMPHOCYTES # BLD AUTO: 2.43 K/UL — SIGNIFICANT CHANGE UP (ref 1–3.3)
LYMPHOCYTES # BLD AUTO: 45.1 % — HIGH (ref 13–44)
MCHC RBC-ENTMCNC: 36.3 GM/DL — HIGH (ref 32–36)
MCHC RBC-ENTMCNC: 39.1 PG — HIGH (ref 27–34)
MCV RBC AUTO: 107.7 FL — HIGH (ref 80–100)
MONOCYTES # BLD AUTO: 0.39 K/UL — SIGNIFICANT CHANGE UP (ref 0–0.9)
MONOCYTES NFR BLD AUTO: 7.2 % — SIGNIFICANT CHANGE UP (ref 2–14)
NEUTROPHILS # BLD AUTO: 2.43 K/UL — SIGNIFICANT CHANGE UP (ref 1.8–7.4)
NEUTROPHILS NFR BLD AUTO: 45.1 % — SIGNIFICANT CHANGE UP (ref 43–77)
PLATELET # BLD AUTO: 153 K/UL — SIGNIFICANT CHANGE UP (ref 150–400)
POTASSIUM SERPL-MCNC: 4 MMOL/L — SIGNIFICANT CHANGE UP (ref 3.5–5.3)
POTASSIUM SERPL-SCNC: 4 MMOL/L — SIGNIFICANT CHANGE UP (ref 3.5–5.3)
PROT SERPL-MCNC: 6.9 G/DL — SIGNIFICANT CHANGE UP (ref 6.6–8.7)
PROTHROM AB SERPL-ACNC: 13.1 SEC — SIGNIFICANT CHANGE UP (ref 10.5–13.4)
RBC # BLD: 3.38 M/UL — LOW (ref 4.2–5.8)
RBC # FLD: 15.4 % — HIGH (ref 10.3–14.5)
SODIUM SERPL-SCNC: 138 MMOL/L — SIGNIFICANT CHANGE UP (ref 135–145)
TROPONIN T SERPL-MCNC: <0.01 NG/ML — SIGNIFICANT CHANGE UP (ref 0–0.06)
WBC # BLD: 5.39 K/UL — SIGNIFICANT CHANGE UP (ref 3.8–10.5)
WBC # FLD AUTO: 5.39 K/UL — SIGNIFICANT CHANGE UP (ref 3.8–10.5)

## 2022-08-18 PROCEDURE — 70496 CT ANGIOGRAPHY HEAD: CPT | Mod: 26,MA

## 2022-08-18 PROCEDURE — 70496 CT ANGIOGRAPHY HEAD: CPT | Mod: MA

## 2022-08-18 PROCEDURE — 84484 ASSAY OF TROPONIN QUANT: CPT

## 2022-08-18 PROCEDURE — 82962 GLUCOSE BLOOD TEST: CPT

## 2022-08-18 PROCEDURE — 0042T: CPT

## 2022-08-18 PROCEDURE — 93005 ELECTROCARDIOGRAM TRACING: CPT

## 2022-08-18 PROCEDURE — 70498 CT ANGIOGRAPHY NECK: CPT | Mod: 26,MA

## 2022-08-18 PROCEDURE — 96361 HYDRATE IV INFUSION ADD-ON: CPT

## 2022-08-18 PROCEDURE — 85730 THROMBOPLASTIN TIME PARTIAL: CPT

## 2022-08-18 PROCEDURE — 70450 CT HEAD/BRAIN W/O DYE: CPT | Mod: MA

## 2022-08-18 PROCEDURE — 80053 COMPREHEN METABOLIC PANEL: CPT

## 2022-08-18 PROCEDURE — 99285 EMERGENCY DEPT VISIT HI MDM: CPT

## 2022-08-18 PROCEDURE — 99285 EMERGENCY DEPT VISIT HI MDM: CPT | Mod: 25

## 2022-08-18 PROCEDURE — 70498 CT ANGIOGRAPHY NECK: CPT | Mod: MA

## 2022-08-18 PROCEDURE — 83605 ASSAY OF LACTIC ACID: CPT

## 2022-08-18 PROCEDURE — 85610 PROTHROMBIN TIME: CPT

## 2022-08-18 PROCEDURE — 71045 X-RAY EXAM CHEST 1 VIEW: CPT

## 2022-08-18 PROCEDURE — 93010 ELECTROCARDIOGRAM REPORT: CPT

## 2022-08-18 PROCEDURE — 71045 X-RAY EXAM CHEST 1 VIEW: CPT | Mod: 26

## 2022-08-18 PROCEDURE — 85025 COMPLETE CBC W/AUTO DIFF WBC: CPT

## 2022-08-18 PROCEDURE — 36415 COLL VENOUS BLD VENIPUNCTURE: CPT

## 2022-08-18 PROCEDURE — 82550 ASSAY OF CK (CPK): CPT

## 2022-08-18 PROCEDURE — 99284 EMERGENCY DEPT VISIT MOD MDM: CPT

## 2022-08-18 RX ORDER — SODIUM CHLORIDE 9 MG/ML
1000 INJECTION INTRAMUSCULAR; INTRAVENOUS; SUBCUTANEOUS ONCE
Refills: 0 | Status: COMPLETED | OUTPATIENT
Start: 2022-08-18 | End: 2022-08-18

## 2022-08-18 RX ORDER — MECLIZINE HCL 12.5 MG
25 TABLET ORAL ONCE
Refills: 0 | Status: COMPLETED | OUTPATIENT
Start: 2022-08-18 | End: 2022-08-18

## 2022-08-18 RX ORDER — MECLIZINE HCL 12.5 MG
1 TABLET ORAL
Qty: 30 | Refills: 0
Start: 2022-08-18 | End: 2022-08-27

## 2022-08-18 RX ORDER — DIPHENHYDRAMINE HCL 50 MG
25 CAPSULE ORAL ONCE
Refills: 0 | Status: COMPLETED | OUTPATIENT
Start: 2022-08-18 | End: 2022-08-18

## 2022-08-18 RX ADMIN — Medication 25 MILLIGRAM(S): at 16:06

## 2022-08-18 RX ADMIN — SODIUM CHLORIDE 1000 MILLILITER(S): 9 INJECTION INTRAMUSCULAR; INTRAVENOUS; SUBCUTANEOUS at 16:08

## 2022-08-18 NOTE — ED PROVIDER NOTE - ATTENDING CONTRIBUTION TO CARE
60yo male with pmh of CAD s/p stents, HTN, PPM for bradycardia presents with dizziness, nausea and feeling off balance since 1130am. Pt does state he had an ear procedure 2 days but symptoms started today. Code Stroke called on arrival, NIHSS 0, no acute findings on imaging, seen and cleared by neuro, resolution of symptoms

## 2022-08-18 NOTE — ED PROVIDER NOTE - PHYSICAL EXAMINATION
VITAL SIGNS: I have reviewed vital signs  CONSTITUTIONAL: in no acute distress.  SKIN: Warm, dry, no rash.  HEAD: Normocephalic, atraumatic.  EYES: PERRL, conjunctiva and sclera clear. EOM intact.  ENT: pink & moist mucosa, no pharyngeal erythema  NECK: Supple, non tender. No cervical lymphadenopathy.  CARD: Regular rate and rhythm. No murmurs.   RESP: No wheezes, rales or rhonchi.   ABD:  soft, non-distended, non-tender.   MSK:  Good ROM in upper/lower extremities w/o pain.   NEURO: Alert, oriented. Grossly unremarkable. No focal deficits.   PSYCH: Cooperative, alert & oriented x3

## 2022-08-18 NOTE — ED ADULT NURSE REASSESSMENT NOTE - NS ED NURSE REASSESS COMMENT FT1
Pt received from critical care. Pt was previous stroke code. Pt reports dizziness prior to ed arrival around 1130, ambulatory at this time, drove himself to [facility] today. Pt reports seeing an ENT for pressure recently and verbalized "the ent removed a couple of hairs". Pt reports pmhx of cardiac hx with stents on plavix and htn. pt educated on plan of care, pt able to successfully teach back plan of care to RN, RN will continue to reeducate pt during hospital stay.

## 2022-08-18 NOTE — CONSULT NOTE ADULT - ASSESSMENT
The patient is a 61y Male who is followed by neurology because of pre-syncope    Pre-syncope  Lightheadedness with nausea and sweat  ?dehydration  NIHSS=0, non focal, does not appear to be stroke  Alteplase and neurointervention is not recommended due to low suspicion of stroke    Tobacco use  suggested to quit tobacco entirely    Alcohol use  suggested to moderate his alcohol use     No further hospital based neuro work up is suggested at this time    discussed with Dr Lancaster (ED resident)    Thank you for allowing me to participate in the care of your patient    Adonis Kidd MD, PhD   [id number]

## 2022-08-18 NOTE — ED ADULT TRIAGE NOTE - CHIEF COMPLAINT QUOTE
Pt was at work at 11:30 and developed dizziness, nausea and feeling off balance. Denies any headache or blurred vision. Denies any chest pain or palpitations. Has 2 stents. Has been having ear problems with fullness in his left ear. Went to ENT 2 days ago and had hair follicles removed from his ear but the pressure in his ear has not subsided but patient does not feel like these symptoms are related to his ear.

## 2022-08-18 NOTE — CONSULT NOTE ADULT - SUBJECTIVE AND OBJECTIVE BOX
[facility]                                     Neurology at [place]                                 Bernabe Sanchez, & Alcides                                  [address]                                        [place], [place], [address]                                             [phone]    CC: dizziness  HPI: The patient is a 61y Male who presented with lightheaded  dizziness accompanied by a sweat and nauseated sensation.  He says it started at about 1130.  He denied room spinning, but felt off balance.  He has been under the care of ENT recently for ear pressure.  He does not drink enough water he said.  He has cardiac history with stents as well as HTN.  He is in process of quitting tobacco and smokes 3 cigarettes/day. Neurology is asked to evaluate.    PAST MEDICAL & SURGICAL HISTORY:  CAD (coronary artery disease)      History of arthroscopic knee surgery      MEDICATIONS  (STANDING):  diphenhydrAMINE 25 milliGRAM(s) Oral once  meclizine 25 milliGRAM(s) Oral Once  sodium chloride 0.9% Bolus 1000 milliLiter(s) IV Bolus once    MEDICATIONS  (PRN):      Allergies    No Known Allergies    Intolerances        SOCIAL HISTORY:  (+) tob,- 3 cigs/day  (+) alcohol six pack of beer few days a week  no drugs    FAMILY HISTORY:  No pertinent family history in first degree relatives  no stroke in either parent    ROS: 14 point ROS negative other than what is present in HPI or below    Vital Signs Last 24 Hrs  T(C): 36.6 (18 Aug 2022 14:55), Max: 36.6 (18 Aug 2022 14:55)  T(F): 97.8 (18 Aug 2022 14:55), Max: 97.8 (18 Aug 2022 14:55)  HR: 80 (18 Aug 2022 14:55) (80 - 80)  BP: 136/91 (18 Aug 2022 14:55) (136/91 - 136/91)  BP(mean): --  RR: 16 (18 Aug 2022 14:55) (16 - 16)  SpO2: 99% (18 Aug 2022 14:55) (99% - 99%)    Parameters below as of 18 Aug 2022 14:55  Patient On (Oxygen Delivery Method): room air      General: NAD    Detailed Neurologic Exam:    Mental status: The patient is awake and alert and has normal attention span.  The patient is fully oriented in 3 spheres. The patient is oriented to current events. The patient is able to name objects, follow commands, repeat sentences.    Cranial nerves: Pupils equal and react symmetrically to light. There is no visual field deficit to confrontation. Extraocular motion is full with no nystagmus. There is no ptosis. Facial sensation is intact. Facial musculature is symmetric. Palate elevates symmetrically. Tongue is midline.    Motor: There is normal bulk and tone.  There is no tremor.  Strength is 5/5 in the right arm and leg.   Strength is 5/5 in the left arm and leg.    Sensation: Intact to light touch and pin in 4 extremities    Reflexes: 2+ throughout and plantar responses are flexor.    Cerebellar: There is no dysmetria on finger to nose testing.    Gait : deferred    [facility] SS:  DATE: 8/16/2022  TIME: 1540  1A: Level of consciousness (0-3): 0  1B: Questions (0-2): 0  1C: Commands (0-2): 0  2: Gaze (0-2): 0  3: Visual fields (0-3): 0  4: Facial palsy (0-3): 0  MOTOR:  5A: Left arm motor drift (0-4): 0  5B: Right arm motor drift (0-4): 0  6A: Left leg motor drift (0-4): 0  6B: Right leg motor drift (0-4): 0  7: Limb ataxia (0-2): 0  SENSORY:  8: Sensation (0-2): 0  SPEECH:  9: Language (0-3): 0  10: Dysarthria (0-2): 0  EXTINCTION:  11: Extinction/inattention (0-2): 0    TOTAL SCORE: 0    LABS:       RADIOLOGY & ADDITIONAL STUDIES (independently reviewed unless otherwise noted):  CT head: no acute stroke, mass or blood.  (+) SVID  CTA head: no aneurysm, AVM, LVO or sig stenosis in COW  CTA neck: no sig carotid or vertebral stenosis  CT Perfusion head - CBF<30% volume 0ml, Tmax>6s volume =0ml                              [facility]                                     Neurology at [place]                                 Bernabe Sanchez, & Alcides                                  [address]                                        [place], [place], [address]                                             [phone]    CC: dizziness  HPI: The patient is a 61y Male who presented with lightheaded  dizziness accompanied by a sweat and nauseated sensation.  He says it started at about 1130.  He denied room spinning, but felt off balance.  He has been under the care of ENT recently for ear pressure.  He does not drink enough water he said.  He has cardiac history with stents as well as HTN.  He is in process of quitting tobacco and smokes 3 cigarettes/day. Neurology is asked to evaluate.    PAST MEDICAL & SURGICAL HISTORY:  CAD (coronary artery disease)      History of arthroscopic knee surgery      MEDICATIONS  (STANDING):  diphenhydrAMINE 25 milliGRAM(s) Oral once  meclizine 25 milliGRAM(s) Oral Once  sodium chloride 0.9% Bolus 1000 milliLiter(s) IV Bolus once    MEDICATIONS  (PRN):      Allergies    No Known Allergies    Intolerances        SOCIAL HISTORY:  (+) tob,- 3 cigs/day  (+) alcohol six pack of beer few days a week  no drugs    FAMILY HISTORY:  Adopted    ROS: 14 point ROS negative other than what is present in HPI or below    Vital Signs Last 24 Hrs  T(C): 36.6 (18 Aug 2022 14:55), Max: 36.6 (18 Aug 2022 14:55)  T(F): 97.8 (18 Aug 2022 14:55), Max: 97.8 (18 Aug 2022 14:55)  HR: 80 (18 Aug 2022 14:55) (80 - 80)  BP: 136/91 (18 Aug 2022 14:55) (136/91 - 136/91)  BP(mean): --  RR: 16 (18 Aug 2022 14:55) (16 - 16)  SpO2: 99% (18 Aug 2022 14:55) (99% - 99%)    Parameters below as of 18 Aug 2022 14:55  Patient On (Oxygen Delivery Method): room air      General: NAD    Detailed Neurologic Exam:    Mental status: The patient is awake and alert and has normal attention span.  The patient is fully oriented in 3 spheres. The patient is oriented to current events. The patient is able to name objects, follow commands, repeat sentences.    Cranial nerves: Pupils equal and react symmetrically to light. There is no visual field deficit to confrontation. Extraocular motion is full with no nystagmus. There is no ptosis. Facial sensation is intact. Facial musculature is symmetric. Palate elevates symmetrically. Tongue is midline.    Motor: There is normal bulk and tone.  There is no tremor.  Strength is 5/5 in the right arm and leg.   Strength is 5/5 in the left arm and leg.    Sensation: Intact to light touch and pin in 4 extremities    Reflexes: 2+ throughout and plantar responses are flexor.    Cerebellar: There is no dysmetria on finger to nose testing.    Gait : deferred    [facility] SS:  DATE: 8/16/2022  TIME: 1540  1A: Level of consciousness (0-3): 0  1B: Questions (0-2): 0  1C: Commands (0-2): 0  2: Gaze (0-2): 0  3: Visual fields (0-3): 0  4: Facial palsy (0-3): 0  MOTOR:  5A: Left arm motor drift (0-4): 0  5B: Right arm motor drift (0-4): 0  6A: Left leg motor drift (0-4): 0  6B: Right leg motor drift (0-4): 0  7: Limb ataxia (0-2): 0  SENSORY:  8: Sensation (0-2): 0  SPEECH:  9: Language (0-3): 0  10: Dysarthria (0-2): 0  EXTINCTION:  11: Extinction/inattention (0-2): 0    TOTAL SCORE: 0    LABS:       RADIOLOGY & ADDITIONAL STUDIES (independently reviewed unless otherwise noted):  CT head: no acute stroke, mass or blood.  (+) SVID  CTA head: no aneurysm, AVM, LVO or sig stenosis in COW  CTA neck: no sig carotid or vertebral stenosis  CT Perfusion head - CBF<30% volume 0ml, Tmax>6s volume =0ml

## 2022-08-18 NOTE — ED PROVIDER NOTE - OBJECTIVE STATEMENT
EDMOND YANEZ is a 60yo M with PMH HTN, CAD s/p stents, pacemaker for bradycardia who presents c/o DIZZY/NAUSEA/OFF BALANCE since 11:30am. Denies any other symptoms.

## 2022-08-18 NOTE — ED ADULT NURSE NOTE - OBJECTIVE STATEMENT
Patient presents to ER C/O dizziness and unsteady gait since 1130 today, equal strength noted in all extremities, no facial droop, denies CP, no visual changes, patient reports PPM and denies blood thinners, resp even/unlabored.

## 2022-08-18 NOTE — ED PROVIDER NOTE - NS ED ROS FT
Review of Systems  CONSTITUTIONAL: afebrile w/no diaphoresis or weight changes  SKIN: warm, dry w/ no rash or bleeding  EYES: no changes to vision  ENT: no changes in hearing, no sore throat  RESPIRATORY: no cough or SOB  CARDIAC: no chest pain & no palpitations  GI: no abd pain, vomiting, diarrhea, constipation, or blood in stool/aneta blood +NAUSEA  GENITO-URINARY: no discharge, dysuria or hematuria,   MUSCULOSKELETAL:  no joint pain, swelling or redness  NEUROLOGIC: no weakness, headache, anesthesia or paresthesias +DIZZY  PSYCH: no anxiety, non suicidal, non homicidal, without hallucinations or depression

## 2022-08-18 NOTE — ED PROVIDER NOTE - CLINICAL SUMMARY MEDICAL DECISION MAKING FREE TEXT BOX
ASSESSMENT:   LETY YANEZ is a 62yo M who presented with DIZZY/NAUSEA/OFF BALANCE. EKG w/o acute changes. Physical exam w/o significant findings.    Concerning for central verse peripheral cause,      PLAN: evaluate for cva. symptomatic control.

## 2022-08-18 NOTE — ED PROVIDER NOTE - PATIENT PORTAL LINK FT
You can access the FollowMyHealth Patient Portal offered by [facility] by registering at the following website: [email or web address]. By joining [facility]’s FollowMyHealth portal, you will also be able to view your health information using other applications (apps) compatible with our system.

## 2022-08-23 ENCOUNTER — APPOINTMENT (OUTPATIENT)
Dept: ELECTROPHYSIOLOGY | Facility: CLINIC | Age: 62
End: 2022-08-23

## 2022-08-23 ENCOUNTER — NON-APPOINTMENT (OUTPATIENT)
Age: 62
End: 2022-08-23

## 2022-08-23 PROCEDURE — 93296 REM INTERROG EVL PM/IDS: CPT

## 2022-08-23 PROCEDURE — 93294 REM INTERROG EVL PM/LDLS PM: CPT

## 2022-09-12 ENCOUNTER — RX CHANGE (OUTPATIENT)
Age: 62
End: 2022-09-12

## 2022-10-12 ENCOUNTER — APPOINTMENT (OUTPATIENT)
Dept: ELECTROPHYSIOLOGY | Facility: CLINIC | Age: 62
End: 2022-10-12
Payer: COMMERCIAL

## 2022-10-12 VITALS
BODY MASS INDEX: 21.33 KG/M2 | WEIGHT: 144 LBS | HEIGHT: 69 IN | DIASTOLIC BLOOD PRESSURE: 78 MMHG | SYSTOLIC BLOOD PRESSURE: 136 MMHG | HEART RATE: 96 BPM | OXYGEN SATURATION: 99 % | TEMPERATURE: 98.2 F

## 2022-10-12 DIAGNOSIS — I49.5 SICK SINUS SYNDROME: ICD-10-CM

## 2022-10-12 DIAGNOSIS — R55 SYNCOPE AND COLLAPSE: ICD-10-CM

## 2022-10-12 PROCEDURE — 93280 PM DEVICE PROGR EVAL DUAL: CPT

## 2022-10-12 PROCEDURE — 99212 OFFICE O/P EST SF 10 MIN: CPT | Mod: 25

## 2022-10-12 RX ORDER — NEOMYCIN AND POLYMYXIN B SULFATES AND HYDROCORTISONE OTIC 10; 3.5; 1 MG/ML; MG/ML; [USP'U]/ML
3.5-10000-1 SUSPENSION AURICULAR (OTIC)
Qty: 10 | Refills: 0 | Status: DISCONTINUED | COMMUNITY
Start: 2022-08-09 | End: 2022-10-12

## 2022-11-07 ENCOUNTER — APPOINTMENT (OUTPATIENT)
Dept: CARDIOLOGY | Facility: CLINIC | Age: 62
End: 2022-11-07
Payer: COMMERCIAL

## 2022-11-07 VITALS
OXYGEN SATURATION: 95 % | SYSTOLIC BLOOD PRESSURE: 118 MMHG | HEIGHT: 60 IN | TEMPERATURE: 99.3 F | WEIGHT: 142 LBS | HEART RATE: 89 BPM | BODY MASS INDEX: 27.88 KG/M2 | DIASTOLIC BLOOD PRESSURE: 70 MMHG

## 2022-11-07 DIAGNOSIS — I25.10 ATHEROSCLEROTIC HEART DISEASE OF NATIVE CORONARY ARTERY W/OUT ANGINA PECTORIS: ICD-10-CM

## 2022-11-07 DIAGNOSIS — E78.5 HYPERLIPIDEMIA, UNSPECIFIED: ICD-10-CM

## 2022-11-07 DIAGNOSIS — Z95.0 PRESENCE OF CARDIAC PACEMAKER: ICD-10-CM

## 2022-11-07 PROCEDURE — 99214 OFFICE O/P EST MOD 30 MIN: CPT

## 2022-11-07 RX ORDER — CLOPIDOGREL BISULFATE 75 MG/1
75 TABLET, FILM COATED ORAL
Qty: 90 | Refills: 3 | Status: ACTIVE | COMMUNITY
Start: 2021-08-18 | End: 1900-01-01

## 2022-11-07 RX ORDER — ATORVASTATIN CALCIUM 80 MG/1
80 TABLET, FILM COATED ORAL
Qty: 90 | Refills: 3 | Status: ACTIVE | COMMUNITY
Start: 2021-08-18 | End: 1900-01-01

## 2022-11-07 RX ORDER — ASPIRIN ENTERIC COATED TABLETS 81 MG 81 MG/1
81 TABLET, DELAYED RELEASE ORAL
Qty: 90 | Refills: 3 | Status: ACTIVE | COMMUNITY
Start: 2021-08-18 | End: 1900-01-01

## 2022-11-07 RX ORDER — METOPROLOL SUCCINATE 25 MG/1
25 TABLET, EXTENDED RELEASE ORAL
Qty: 90 | Refills: 3 | Status: ACTIVE | COMMUNITY
Start: 2021-08-20 | End: 1900-01-01

## 2022-11-23 ENCOUNTER — NON-APPOINTMENT (OUTPATIENT)
Age: 62
End: 2022-11-23

## 2022-12-01 PROBLEM — R55 SYNCOPE: Status: ACTIVE | Noted: 2021-08-18

## 2022-12-01 PROBLEM — I49.5 SINUS NODE DYSFUNCTION: Status: ACTIVE | Noted: 2021-08-18

## 2022-12-01 NOTE — HISTORY OF PRESENT ILLNESS
[FreeTextEntry1] : Mr. Chatman is a pleasant 61 year old male here for follow up today. He has a history of syncope and sinus node dysfunction s/p dual chamber PPM, CAD s/p PCI to RCA. \par \par To summarize, the patient was admitted on 7/29/21 with multiple episodes of near syncope and syncope. Orthostatics positive at time of presentation. Telemetry revealed sinus bradycardia with intermittent junctional rhythm and some episodes of dizziness correlating with bradycardia. CTA demonstrated significant CAD, and he underwent LHC confirming severe RCA disease s/p PCI via CHETAN x 2 to RCA. Despite revascularization he continued to have marked symptomatic sinus bradycardia, as well as chronotropic incompetence. Subsequently, \par he underwent a dual chamber pacemaker implant on 8/2/21. He is here for follow up today. He reports to be feeling well and denies any shortness of breath, dizziness, chest pain or syncope.

## 2022-12-01 NOTE — DISCUSSION/SUMMARY
[FreeTextEntry1] : In summary, Mr. Chatman is a 61 year old male with a history of CAD s/p PCI, syncope and sinus node dysfunction s/p dual chamber pacemaker. He is doing well. Pacemaker interrogation shows normal device function and good battery life. Brief episodes of non-sustained atrial tachycardia are noted. He is on metoprolol which he will continue.

## 2022-12-13 ENCOUNTER — APPOINTMENT (OUTPATIENT)
Dept: OTOLARYNGOLOGY | Facility: CLINIC | Age: 62
End: 2022-12-13
Payer: COMMERCIAL

## 2022-12-13 VITALS
HEIGHT: 69 IN | TEMPERATURE: 98 F | DIASTOLIC BLOOD PRESSURE: 77 MMHG | BODY MASS INDEX: 21.92 KG/M2 | SYSTOLIC BLOOD PRESSURE: 124 MMHG | HEART RATE: 74 BPM | WEIGHT: 148 LBS

## 2022-12-13 PROCEDURE — 99213 OFFICE O/P EST LOW 20 MIN: CPT

## 2022-12-13 NOTE — ASSESSMENT
[FreeTextEntry1] : 62M with left ear fullness. Tried irrigations and flonase without benefit. Does not report a history of TMJ. Takes NSAIDs for arthritis elsewhere in his body.

## 2022-12-13 NOTE — HISTORY OF PRESENT ILLNESS
[de-identified] : 62 year old male presents for follow up for left otalgia. States has constant left ear pressure, 50% hearing loss from left ear, has tingling sensation inside, constant bilateral  tinnitus.Patient denies fevers, ear infections, dizziness, vertigo, or headaches related to hearing.\par

## 2023-01-11 ENCOUNTER — NON-APPOINTMENT (OUTPATIENT)
Age: 63
End: 2023-01-11

## 2023-01-11 ENCOUNTER — APPOINTMENT (OUTPATIENT)
Dept: ELECTROPHYSIOLOGY | Facility: CLINIC | Age: 63
End: 2023-01-11
Payer: COMMERCIAL

## 2023-01-11 PROCEDURE — 93296 REM INTERROG EVL PM/IDS: CPT

## 2023-01-11 PROCEDURE — 93294 REM INTERROG EVL PM/LDLS PM: CPT

## 2023-01-13 ENCOUNTER — NON-APPOINTMENT (OUTPATIENT)
Age: 63
End: 2023-01-13

## 2023-01-17 ENCOUNTER — APPOINTMENT (OUTPATIENT)
Dept: CARDIOLOGY | Facility: CLINIC | Age: 63
End: 2023-01-17
Payer: COMMERCIAL

## 2023-01-17 ENCOUNTER — NON-APPOINTMENT (OUTPATIENT)
Age: 63
End: 2023-01-17

## 2023-01-17 VITALS
OXYGEN SATURATION: 95 % | WEIGHT: 148 LBS | BODY MASS INDEX: 21.92 KG/M2 | HEART RATE: 79 BPM | TEMPERATURE: 97.6 F | SYSTOLIC BLOOD PRESSURE: 128 MMHG | HEIGHT: 69 IN | DIASTOLIC BLOOD PRESSURE: 62 MMHG

## 2023-01-17 DIAGNOSIS — R07.9 CHEST PAIN, UNSPECIFIED: ICD-10-CM

## 2023-01-17 DIAGNOSIS — R53.83 OTHER FATIGUE: ICD-10-CM

## 2023-01-17 PROCEDURE — 93000 ELECTROCARDIOGRAM COMPLETE: CPT

## 2023-01-17 PROCEDURE — 99214 OFFICE O/P EST MOD 30 MIN: CPT | Mod: 25

## 2023-01-18 PROBLEM — R53.83 FATIGUE, UNSPECIFIED TYPE: Status: ACTIVE | Noted: 2021-02-26

## 2023-01-30 PROBLEM — E78.5 HYPERLIPIDEMIA: Status: ACTIVE | Noted: 2021-08-18

## 2023-01-30 PROBLEM — I25.10 CAD (CORONARY ARTERY DISEASE): Status: ACTIVE | Noted: 2021-08-20

## 2023-01-30 PROBLEM — Z95.0 CARDIAC PACEMAKER IN SITU: Status: ACTIVE | Noted: 2021-08-18

## 2023-01-30 NOTE — CARDIOLOGY SUMMARY
[de-identified] : 8/18/2021: Sinus  Rhythm @ 74 bpm \par -  Negative precordial T-waves. [de-identified] : < from: TTE Echo Complete w/o Contrast w/ Doppler (07.29.21 @ 13:36) >\par \par Summary:\par  1. Normal left atrial size.\par  2. Color flow doppler and intravenous injection of agitated saline demonstrates the presence of an intact intra atrial septum.\par  3. Normal wall motion. Left ventricular ejection fraction, by visual estimation, is 65 to 70%. Grade I diastolic dysfunction.\par  4. Normal right atrial size.\par  5. Normal right ventricular size and function.\par  6. No significant valvular abnormality.\par  7. There is no evidence of pericardial effusion.\par  [de-identified] : < from: Cardiac Cath Lab (07.30.21 @ 17:04) >\par CORONARY VESSELS: The coronary circulation is co-dominant.\par LM:   --  LM: Normal.\par LAD:   --  Mid LAD: There was a 40 % stenosis.\par CX:   --  OM1: There was a 50 % stenosis.\par RCA:   --  Mid RCA: There was a tubular 80 % stenosis.\par --  Distal RCA: There was a diffuse 70 % stenosis.\par COMPLICATIONS: No complications occurred during the cath lab visit.\par DIAGNOSTIC IMPRESSIONS: iFR LAD- 0.95, iFR Circumflex - 0.98. PCI deferred.\par Severe RCA disease. PCI with 2 CHETAN.\par DIAGNOSTIC RECOMMENDATIONS: Aspirin and plavix.\par Patient's bradycardia/syncope, appear to be out of proportion to CAD. Needs\par continuedmonitoring during the weekend. If has recurrent symptoms, will\par need further EP workup/management.\par INTERVENTIONAL IMPRESSIONS: iFR LAD- 0.95, iFR Circumflex - 0.98. PCI\par deferred.\par Severe RCA disease. PCI with 2 CHETAN.\par INTERVENTIONAL RECOMMENDATIONS:Aspirin and plavix.\par Patient's bradycardia/syncope, appear to be out of proportion to CAD. Needs\par continued monitoring during the weekend. If has recurrent symptoms, will\par need further EP workup/management.\par \par

## 2023-01-30 NOTE — PHYSICAL EXAM
[Well Developed] : well developed [Well Nourished] : well nourished [No Acute Distress] : no acute distress [Normal Conjunctiva] : normal conjunctiva [Normal Venous Pressure] : normal venous pressure [No Carotid Bruit] : no carotid bruit [Normal S1, S2] : normal S1, S2 [No Murmur] : no murmur [No Rub] : no rub [No Gallop] : no gallop [Clear Lung Fields] : clear lung fields [Good Air Entry] : good air entry [No Respiratory Distress] : no respiratory distress  [Soft] : abdomen soft [Non Tender] : non-tender [No Masses/organomegaly] : no masses/organomegaly [Normal Bowel Sounds] : normal bowel sounds [Normal Gait] : normal gait [No Edema] : no edema [No Cyanosis] : no cyanosis [No Clubbing] : no clubbing [No Varicosities] : no varicosities [No Rash] : no rash [No Skin Lesions] : no skin lesions [Moves all extremities] : moves all extremities [No Focal Deficits] : no focal deficits [Normal Speech] : normal speech [Alert and Oriented] : alert and oriented [Normal memory] : normal memory [de-identified] : _ PPM generator in place with no evidence of ecchymosis or erythema

## 2023-01-30 NOTE — ASSESSMENT
[FreeTextEntry1] : 63 y/o M, active smoker, CAD (s/p PCI to the mRCA and dRCA; 40% mLAD, 50% LCx on 7/30/2021) and symptomatic bradycardia with chronotropic incompetence s/p Medtronic PPM on 8/2/2021) and dyslipidemia  presents for hospital follow up \par \par 1. CAD (s/p PCI to the  mRCA and dRCA; 40% mLAD, 50% LCx on 7/30/2021)\par - patient has stable CAD with no complaints of any chest pain or shortness of breath \par - EKG reviewed from EP visit and noted to be NSR @ 74 bpm, no changes compared to pre hospital discharge EKG \par - on ASA and Plavix and high intensity statin \par - on Toprol XL 25mg poq daily \par \par 2. Symptomatic bradycardia  with chronotropic incompetence s/p Medtronic PPM on 8/2/2021\par Dual chamber Medtronic PPM interrogation reveals normal function. A, RV with adequate sense and pace thresholds. AP- 43.9%, -0.1%. One episode of AT. VS. ST in arrhythmia log, correlated with patient hurrying this am to get ready to come to office. \par - site looks good with no evidence of dehiscence, erythema or ecchymosis \par - follows with Dr. Rose \par \par 3. Dyslipidemia \par - \par - continue with Lipitor 80mg po q daily \par \par Zoey Herzog D.O. FACC\par Cardiology/Vascular Cardiology -[facility]\par Telephone # [phone]\par

## 2023-03-07 NOTE — ED ADULT TRIAGE NOTE - GLASGOW COMA SCALE: BEST MOTOR RESPONSE, MLM
Rx received for Epclusa. Per chart review, pt is s/p treatment with Harvoni. Vosevi x 12 weeks recommended per AASLD guidelines. Provider messaged and will proceed with Vosevi. Closing Epclusa referral.    (M6) obeys commands

## 2023-03-24 ENCOUNTER — APPOINTMENT (OUTPATIENT)
Dept: OTOLARYNGOLOGY | Facility: CLINIC | Age: 63
End: 2023-03-24
Payer: COMMERCIAL

## 2023-03-24 DIAGNOSIS — H93.8X2 OTHER SPECIFIED DISORDERS OF LEFT EAR: ICD-10-CM

## 2023-03-24 DIAGNOSIS — R42 DIZZINESS AND GIDDINESS: ICD-10-CM

## 2023-03-24 DIAGNOSIS — H93.12 TINNITUS, LEFT EAR: ICD-10-CM

## 2023-03-24 DIAGNOSIS — H93.8X9 OTHER SPECIFIED DISORDERS OF EAR, UNSPECIFIED EAR: ICD-10-CM

## 2023-03-24 PROCEDURE — 92557 COMPREHENSIVE HEARING TEST: CPT

## 2023-03-24 PROCEDURE — 99214 OFFICE O/P EST MOD 30 MIN: CPT | Mod: 25

## 2023-03-24 PROCEDURE — 92567 TYMPANOMETRY: CPT

## 2023-03-24 PROCEDURE — 92504 EAR MICROSCOPY EXAMINATION: CPT

## 2023-03-24 NOTE — ASSESSMENT
[FreeTextEntry1] : 62-year-old male with constant sensation of left ear fullness, no subjective fluctuations in hearing, no vertigo.  Otoscopic exam today shows intact bilateral tympanic membranes without effusion or retraction.  I personally ordered and reviewed an audiogram for his hearing loss, which shows a left low-frequency hearing asymmetry, otherwise symmetric downsloping sensorineural hearing loss bilaterally, type a tympanograms bilaterally.  I personally reviewed and interpreted prior head CT images from August 2022, which shows no middle ear or mastoid pathology.\par \par MRI recommended for left low-frequency hearing asymmetry; patient has a pacemaker so we will have to confirm with his cardiologist whether he can proceed with this.  Recheck hearing in 3 to 4 months to ensure no hearing fluctuations, if this occurs will consider initiation of diuretic.  Discussed possibility of cochlear hydrops in the left ear.

## 2023-03-24 NOTE — HISTORY OF PRESENT ILLNESS
[de-identified] : 63 y/o M presents for evaluation of left ear pressure; diminished hearing of left ear\par reports left ear pressure that started a few months ago; reports occasional tickle/itchiness of left ear\par reports constant bilateral tinnitus that has been ongoing for a few years\par Patient denies otalgia, otorrhea, recent ear infections, dizziness/vertigo, or headaches related to hearing

## 2023-04-12 ENCOUNTER — APPOINTMENT (OUTPATIENT)
Dept: ELECTROPHYSIOLOGY | Facility: CLINIC | Age: 63
End: 2023-04-12

## 2023-06-26 ENCOUNTER — APPOINTMENT (OUTPATIENT)
Dept: OTOLARYNGOLOGY | Facility: CLINIC | Age: 63
End: 2023-06-26

## 2023-07-12 ENCOUNTER — NON-APPOINTMENT (OUTPATIENT)
Age: 63
End: 2023-07-12

## 2023-07-12 ENCOUNTER — APPOINTMENT (OUTPATIENT)
Dept: ELECTROPHYSIOLOGY | Facility: CLINIC | Age: 63
End: 2023-07-12
Payer: SELF-PAY

## 2023-07-12 PROCEDURE — 93294 REM INTERROG EVL PM/LDLS PM: CPT

## 2023-07-12 PROCEDURE — 93296 REM INTERROG EVL PM/IDS: CPT

## 2023-09-09 ENCOUNTER — EMERGENCY (EMERGENCY)
Facility: HOSPITAL | Age: 63
LOS: 1 days | Discharge: HOME | End: 2023-09-09
Attending: EMERGENCY MEDICINE
Payer: SELF-PAY

## 2023-09-09 VITALS
RESPIRATION RATE: 18 BRPM | HEART RATE: 104 BPM | SYSTOLIC BLOOD PRESSURE: 148 MMHG | OXYGEN SATURATION: 97 % | TEMPERATURE: 98 F | DIASTOLIC BLOOD PRESSURE: 111 MMHG

## 2023-09-09 DIAGNOSIS — Z98.890 OTHER SPECIFIED POSTPROCEDURAL STATES: Chronic | ICD-10-CM

## 2023-09-09 LAB
ALBUMIN SERPL ELPH-MCNC: 4.3 G/DL — SIGNIFICANT CHANGE UP (ref 3.3–5.2)
ALP SERPL-CCNC: 142 U/L — HIGH (ref 40–120)
ALT FLD-CCNC: 66 U/L — HIGH
ANION GAP SERPL CALC-SCNC: 14 MMOL/L — SIGNIFICANT CHANGE UP (ref 5–17)
ANISOCYTOSIS BLD QL: SIGNIFICANT CHANGE UP
AST SERPL-CCNC: 101 U/L — HIGH
BASOPHILS # BLD AUTO: 0.03 K/UL — SIGNIFICANT CHANGE UP (ref 0–0.2)
BASOPHILS NFR BLD AUTO: 0.5 % — SIGNIFICANT CHANGE UP (ref 0–2)
BILIRUB SERPL-MCNC: 0.7 MG/DL — SIGNIFICANT CHANGE UP (ref 0.4–2)
BUN SERPL-MCNC: 10.9 MG/DL — SIGNIFICANT CHANGE UP (ref 8–20)
CALCIUM SERPL-MCNC: 9.2 MG/DL — SIGNIFICANT CHANGE UP (ref 8.4–10.5)
CHLORIDE SERPL-SCNC: 102 MMOL/L — SIGNIFICANT CHANGE UP (ref 96–108)
CO2 SERPL-SCNC: 24 MMOL/L — SIGNIFICANT CHANGE UP (ref 22–29)
CREAT SERPL-MCNC: 0.78 MG/DL — SIGNIFICANT CHANGE UP (ref 0.5–1.3)
EGFR: 101 ML/MIN/1.73M2 — SIGNIFICANT CHANGE UP
EOSINOPHIL # BLD AUTO: 0.17 K/UL — SIGNIFICANT CHANGE UP (ref 0–0.5)
EOSINOPHIL NFR BLD AUTO: 3.1 % — SIGNIFICANT CHANGE UP (ref 0–6)
ETHANOL SERPL-MCNC: 352 MG/DL — HIGH (ref 0–9)
GLUCOSE SERPL-MCNC: 103 MG/DL — HIGH (ref 70–99)
HCT VFR BLD CALC: 43 % — SIGNIFICANT CHANGE UP (ref 39–50)
HGB BLD-MCNC: 15.2 G/DL — SIGNIFICANT CHANGE UP (ref 13–17)
IMM GRANULOCYTES NFR BLD AUTO: 0.5 % — SIGNIFICANT CHANGE UP (ref 0–0.9)
LYMPHOCYTES # BLD AUTO: 1.36 K/UL — SIGNIFICANT CHANGE UP (ref 1–3.3)
LYMPHOCYTES # BLD AUTO: 24.5 % — SIGNIFICANT CHANGE UP (ref 13–44)
MACROCYTES BLD QL: SIGNIFICANT CHANGE UP
MAGNESIUM SERPL-MCNC: 1.7 MG/DL — LOW (ref 1.8–2.6)
MANUAL SMEAR VERIFICATION: SIGNIFICANT CHANGE UP
MCHC RBC-ENTMCNC: 35.3 GM/DL — SIGNIFICANT CHANGE UP (ref 32–36)
MCHC RBC-ENTMCNC: 37.4 PG — HIGH (ref 27–34)
MCV RBC AUTO: 105.9 FL — HIGH (ref 80–100)
MONOCYTES # BLD AUTO: 0.5 K/UL — SIGNIFICANT CHANGE UP (ref 0–0.9)
MONOCYTES NFR BLD AUTO: 9 % — SIGNIFICANT CHANGE UP (ref 2–14)
NEUTROPHILS # BLD AUTO: 3.47 K/UL — SIGNIFICANT CHANGE UP (ref 1.8–7.4)
NEUTROPHILS NFR BLD AUTO: 62.4 % — SIGNIFICANT CHANGE UP (ref 43–77)
PLAT MORPH BLD: NORMAL — SIGNIFICANT CHANGE UP
PLATELET # BLD AUTO: 207 K/UL — SIGNIFICANT CHANGE UP (ref 150–400)
POTASSIUM SERPL-MCNC: 4.3 MMOL/L — SIGNIFICANT CHANGE UP (ref 3.5–5.3)
POTASSIUM SERPL-SCNC: 4.3 MMOL/L — SIGNIFICANT CHANGE UP (ref 3.5–5.3)
PROT SERPL-MCNC: 7.2 G/DL — SIGNIFICANT CHANGE UP (ref 6.6–8.7)
RBC # BLD: 4.06 M/UL — LOW (ref 4.2–5.8)
RBC # FLD: 14.4 % — SIGNIFICANT CHANGE UP (ref 10.3–14.5)
RBC BLD AUTO: SIGNIFICANT CHANGE UP
SODIUM SERPL-SCNC: 140 MMOL/L — SIGNIFICANT CHANGE UP (ref 135–145)
TROPONIN T SERPL-MCNC: <0.01 NG/ML — SIGNIFICANT CHANGE UP (ref 0–0.06)
WBC # BLD: 5.56 K/UL — SIGNIFICANT CHANGE UP (ref 3.8–10.5)
WBC # FLD AUTO: 5.56 K/UL — SIGNIFICANT CHANGE UP (ref 3.8–10.5)

## 2023-09-09 PROCEDURE — G1004: CPT

## 2023-09-09 PROCEDURE — 72125 CT NECK SPINE W/O DYE: CPT | Mod: 26,MG

## 2023-09-09 PROCEDURE — 71045 X-RAY EXAM CHEST 1 VIEW: CPT | Mod: 26

## 2023-09-09 PROCEDURE — 99285 EMERGENCY DEPT VISIT HI MDM: CPT | Mod: 57

## 2023-09-09 PROCEDURE — 93010 ELECTROCARDIOGRAM REPORT: CPT

## 2023-09-09 PROCEDURE — 73110 X-RAY EXAM OF WRIST: CPT | Mod: 26,LT

## 2023-09-09 PROCEDURE — 25600 CLTX DST RDL FX/EPHYS SEP WO: CPT | Mod: 54,LT

## 2023-09-09 PROCEDURE — 70450 CT HEAD/BRAIN W/O DYE: CPT | Mod: 26,MG

## 2023-09-09 RX ORDER — IBUPROFEN 200 MG
400 TABLET ORAL ONCE
Refills: 0 | Status: COMPLETED | OUTPATIENT
Start: 2023-09-09 | End: 2023-09-09

## 2023-09-09 RX ADMIN — Medication 400 MILLIGRAM(S): at 21:20

## 2023-09-09 NOTE — ED PROVIDER NOTE - CARE PROVIDER_API CALL
Hemal Fischer  Orthopaedic Surgery  [address] [place] [address]  Phone: [phone]  Fax: [phone]  Follow Up Time: 4-6 Days

## 2023-09-09 NOTE — ED PROVIDER NOTE - NSFOLLOWUPINSTRUCTIONS_ED_ALL_ED_FT
- Follow up with ortho.  - Take ibuprofen as needed for pain.     RICE  RICE stands for rest, ice, compression, and elevation. Doing these things helps limit pain and swelling after an injury. RICE also helps injuries heal faster.   - Rest: Pain is your body’s way of telling you to rest an injured area. Whether you have hurt an elbow, hand, foot, or knee, limiting its use will prevent further injury and help you heal.  - Ice: Applying ice right after an injury helps prevent swelling and reduce pain. Don’t place ice directly on your skin. Use a cold pack. Or make an ice pack by putting ice cubes in a plastic bag that seals at the top. Wrap the cold pack or ice pack in a thin cloth. Place it over the injured area. Ice for  10 minutes every  3 hours. Don’t ice for more than  20 minutes at a time.  - Compression: Putting pressure (compression) on an injury helps prevent swelling and provides support. Wrap the injured area firmly with an elastic bandage. If your hand or foot tingles, becomes discolored, or feels cold to the touch, the bandage may be too tight. Rewrap it more loosely. If your bandage becomes too loose, rewrap it. Don't wear an elastic bandage overnight.  - Elevation: Keeping an injury elevated helps reduce swelling, pain, and throbbing. Elevation is most effective when the injury is kept elevated higher than the heart.    Return to the ED if:  - Fingers or toes feel numb, tingly, are cold to the touch, or change color.  - Skin looks shiny or tight.  - Pain, swelling, or bruising gets worse and isn't improved with elevation.  - Signs of infection. These include warm skin, redness, fluid leaking, or bad smell coming from the injured body part.

## 2023-09-09 NOTE — ED PROVIDER NOTE - NSICDXPASTMEDICALHX_GEN_ALL_CORE_FT
PAST MEDICAL HISTORY:  CAD (coronary artery disease)     ETOH abuse     H/O sinus bradycardia s/p pacemaker

## 2023-09-09 NOTE — ED ADULT TRIAGE NOTE - CHIEF COMPLAINT QUOTE
BIBEMS from home s/p witnessed fall down a flight of 12-15 steps. As per EMS the pt is intoxicated. Pt is prescribed Plavix but is non-compliant, +LOC as per the wife. GCS 15 at this time. MD called to evaluate, pt taken directly to CT scan.

## 2023-09-09 NOTE — ED PROVIDER NOTE - CLINICAL SUMMARY MEDICAL DECISION MAKING FREE TEXT BOX
Patient is a 61yo M with PMHx of Etoh abuse, symptomatic anamika s/p pacemaker, CAD who presents to the ED complaining of fall down stairs in the setting of alcohol use (endorses drinking two pints of vodka today). Will do cardiac w/u to r/o cardiac cause for syncope in the setting of amnesia to the event, xray wrist, ct head and neck.

## 2023-09-09 NOTE — ED PROVIDER NOTE - PHYSICAL EXAMINATION
Gen: AAOx2, NAD, well nourished, alcohol on breath.  HEENT: Normocephalic atraumatic. EOMI. No scleral icterus. Moist mucus membranes. Blood in oropharynx, poor dentition, no visible lacerations.  CV: RRR. Audible S1 and S2. No murmurs. 2+ radial and PT pulses. No chest wall ttp.  Pulm: Clear to auscultation bilaterally. No wheezes, rales, or rhonchi. No accessory muscle use or respiratory distress.  Abdomen: soft, normoactive BS, non distended, nontender, no rebound, no guarding. Pelvis stable.  Musculoskeletal:  L wrist ttp, full AROM.  Neck/Back: No neck or back midline ttp, no step offs or deformities.  Skin: No rashes or lesions. Warm.  Neurologic: No focal neurological deficits. CN II-XII grossly intact.  Psych: Appropriate mood and affect. Cooperative.

## 2023-09-09 NOTE — ED PROVIDER NOTE - PATIENT PORTAL LINK FT
You can access the FollowMyHealth Patient Portal offered by [facility] by registering at the following website: [email or web address]. By joining [employer]’s FollowMyHealth portal, you will also be able to view your health information using other applications (apps) compatible with our system.

## 2023-09-09 NOTE — ED PROVIDER NOTE - NS ED ROS FT
General: No fever, chills.  Respiratory: No SOB  Cardiac: No chest pain  GI: No abdominal pain, nausea, vomiting  Neuro: No headache  MSK: No muscle pain, joint pain, back pain.  Psych: No known mental health issues.  Endocrine: No heat/cold intolerance, no polyuria/polydipsia.  Heme: No easy bruising or bleeding.  Allergic: No pruritis, dermatitis, or environmental allergies.

## 2023-09-09 NOTE — ED ADULT NURSE REASSESSMENT NOTE - NS ED NURSE REASSESS COMMENT FT1
assumed care for patient at 1900. patient awaiting xray results. No complaints of pain or discomfort.

## 2023-09-09 NOTE — ED PROVIDER NOTE - ATTENDING CONTRIBUTION TO CARE
I personally saw the patient with the resident, and completed the key components of the history and physical exam. I then discussed the management plan with the resident. Mechanical fall down stairs, left wrist fx splinted, no other significant trauma, clinically sober on dc.

## 2023-09-09 NOTE — ED PROVIDER NOTE - OBJECTIVE STATEMENT
Patient is a 61yo M with PMHx of Etoh abuse, symptomatic anamika s/p pacemaker, CAD who presents to the ED complaining of fall down stairs in the setting of alcohol use (endorses drinking two pints of vodka today). Found at the bottom of stairs unconscious by wife, blood coming from mouth. Patient is confused, does not remember falling. Denies pain, no medical complaints. Patient states he is supposed to be on a blood thinner but does not take it.

## 2023-09-09 NOTE — ED ADULT NURSE NOTE - OBJECTIVE STATEMENT
62 yom presents to ed s/p fall down 13 stairs at home. witnessed by girlfriend   unknown loc no blood thinners. pt drank 1.5 liters of alcohol

## 2023-09-10 PROCEDURE — 71045 X-RAY EXAM CHEST 1 VIEW: CPT

## 2023-09-10 PROCEDURE — 99285 EMERGENCY DEPT VISIT HI MDM: CPT | Mod: 25

## 2023-09-10 PROCEDURE — 73110 X-RAY EXAM OF WRIST: CPT

## 2023-09-10 PROCEDURE — 83735 ASSAY OF MAGNESIUM: CPT

## 2023-09-10 PROCEDURE — 70450 CT HEAD/BRAIN W/O DYE: CPT | Mod: MG

## 2023-09-10 PROCEDURE — 84484 ASSAY OF TROPONIN QUANT: CPT

## 2023-09-10 PROCEDURE — 85025 COMPLETE CBC W/AUTO DIFF WBC: CPT

## 2023-09-10 PROCEDURE — 36415 COLL VENOUS BLD VENIPUNCTURE: CPT

## 2023-09-10 PROCEDURE — 29125 APPL SHORT ARM SPLINT STATIC: CPT | Mod: LT

## 2023-09-10 PROCEDURE — 93005 ELECTROCARDIOGRAM TRACING: CPT

## 2023-09-10 PROCEDURE — 72125 CT NECK SPINE W/O DYE: CPT | Mod: MG

## 2023-09-10 PROCEDURE — 80053 COMPREHEN METABOLIC PANEL: CPT

## 2023-09-10 PROCEDURE — G1004: CPT

## 2023-09-10 PROCEDURE — 80307 DRUG TEST PRSMV CHEM ANLYZR: CPT

## 2023-09-15 PROBLEM — Z86.79 PERSONAL HISTORY OF OTHER DISEASES OF THE CIRCULATORY SYSTEM: Chronic | Status: ACTIVE | Noted: 2023-09-09

## 2023-09-15 PROBLEM — F10.10 ALCOHOL ABUSE, UNCOMPLICATED: Chronic | Status: ACTIVE | Noted: 2023-09-09

## 2023-09-18 ENCOUNTER — APPOINTMENT (OUTPATIENT)
Dept: CARDIOLOGY | Facility: CLINIC | Age: 63
End: 2023-09-18

## 2023-10-11 ENCOUNTER — NON-APPOINTMENT (OUTPATIENT)
Age: 63
End: 2023-10-11

## 2023-10-11 ENCOUNTER — APPOINTMENT (OUTPATIENT)
Dept: ELECTROPHYSIOLOGY | Facility: CLINIC | Age: 63
End: 2023-10-11
Payer: SELF-PAY

## 2023-10-12 PROCEDURE — 93294 REM INTERROG EVL PM/LDLS PM: CPT

## 2023-10-12 PROCEDURE — 93296 REM INTERROG EVL PM/IDS: CPT

## 2023-11-06 ENCOUNTER — APPOINTMENT (OUTPATIENT)
Dept: CARDIOLOGY | Facility: CLINIC | Age: 63
End: 2023-11-06

## 2024-01-10 ENCOUNTER — APPOINTMENT (OUTPATIENT)
Dept: ELECTROPHYSIOLOGY | Facility: CLINIC | Age: 64
End: 2024-01-10

## 2024-02-09 ENCOUNTER — APPOINTMENT (OUTPATIENT)
Dept: ELECTROPHYSIOLOGY | Facility: CLINIC | Age: 64
End: 2024-02-09

## 2024-04-09 NOTE — ED ADULT TRIAGE NOTE - MODE OF ARRIVAL
Walk in
Detail Level: Detailed
Quality 431: Preventive Care And Screening: Unhealthy Alcohol Use - Screening: Patient not identified as an unhealthy alcohol user when screened for unhealthy alcohol use using a systematic screening method
Quality 130: Documentation Of Current Medications In The Medical Record: Current Medications Documented
Quality 226: Preventive Care And Screening: Tobacco Use: Screening And Cessation Intervention: Patient screened for tobacco use and is an ex/non-smoker

## 2024-05-10 ENCOUNTER — APPOINTMENT (OUTPATIENT)
Dept: ELECTROPHYSIOLOGY | Facility: CLINIC | Age: 64
End: 2024-05-10

## 2024-07-24 NOTE — ED PROVIDER NOTE - NSTIMEPROVIDERCAREINITIATE_GEN_ER
Isolate for 5 days and wear a mask for an additional 5 days. Use separate bathroom if possible.     Follow up if symptoms persist or if symptoms worsen please present to the ED.    Indications for hospitalization - There are no fixed criteria for inpatient hospital admission with COVID-19; however, hospitalization is warranted for most patients with any of the following:  -An oxygen saturation of <94 percent on room air  -Respiratory rate of >30 breaths/minute  -PaO2/FiO2 <300 mmHg  -Lung infiltrates >50 percent .Begin using pulse oximetry meter and record values. Call or return to clinic if no improvement or any worsening   29-Jul-2021 09:13

## 2024-10-01 NOTE — PATIENT PROFILE ADULT - TRANSPORTATION
No care due was identified.  [facility] Embedded Care Due Messages. Reference number: [id number].   10/01/2024 10:46:26 AM CDT   no

## 2024-10-27 NOTE — ED ADULT NURSE NOTE - DRUG PRE-SCREENING (DAST -1)
Level of Care: Telemetry [5]   Diagnosis: Hip fracture [197979]   Admitting Physician: CLIFFORD BARKSDALE [[id number]]   Certification: I Certify That Inpatient Hospital Services Are Medically Necessary For Greater Than 2 Midnights  
Statement Selected

## 2025-01-20 ENCOUNTER — EMERGENCY (EMERGENCY)
Facility: HOSPITAL | Age: 65
LOS: 1 days | Discharge: HOME | End: 2025-01-20
Attending: STUDENT IN AN ORGANIZED HEALTH CARE EDUCATION/TRAINING PROGRAM
Payer: SELF-PAY

## 2025-01-20 VITALS
TEMPERATURE: 98 F | HEART RATE: 89 BPM | WEIGHT: 141.1 LBS | OXYGEN SATURATION: 98 % | SYSTOLIC BLOOD PRESSURE: 111 MMHG | DIASTOLIC BLOOD PRESSURE: 70 MMHG | RESPIRATION RATE: 18 BRPM | HEIGHT: 70 IN

## 2025-01-20 DIAGNOSIS — Z98.890 OTHER SPECIFIED POSTPROCEDURAL STATES: Chronic | ICD-10-CM

## 2025-01-20 LAB
ALBUMIN SERPL ELPH-MCNC: 3.2 G/DL — LOW (ref 3.3–5.2)
ALP SERPL-CCNC: 163 U/L — HIGH (ref 40–120)
ALT FLD-CCNC: 11 U/L — SIGNIFICANT CHANGE UP
ANION GAP SERPL CALC-SCNC: 11 MMOL/L — SIGNIFICANT CHANGE UP (ref 5–17)
ANISOCYTOSIS BLD QL: SIGNIFICANT CHANGE UP
AST SERPL-CCNC: 25 U/L — SIGNIFICANT CHANGE UP
BASOPHILS # BLD AUTO: 0.2 K/UL — SIGNIFICANT CHANGE UP (ref 0–0.2)
BASOPHILS NFR BLD AUTO: 3.5 % — HIGH (ref 0–2)
BILIRUB SERPL-MCNC: 0.2 MG/DL — LOW (ref 0.4–2)
BUN SERPL-MCNC: 7.6 MG/DL — LOW (ref 8–20)
CALCIUM SERPL-MCNC: 8.5 MG/DL — SIGNIFICANT CHANGE UP (ref 8.4–10.5)
CHLORIDE SERPL-SCNC: 103 MMOL/L — SIGNIFICANT CHANGE UP (ref 96–108)
CO2 SERPL-SCNC: 24 MMOL/L — SIGNIFICANT CHANGE UP (ref 22–29)
CREAT SERPL-MCNC: 0.61 MG/DL — SIGNIFICANT CHANGE UP (ref 0.5–1.3)
DACRYOCYTES BLD QL SMEAR: SLIGHT — SIGNIFICANT CHANGE UP
EGFR: 107 ML/MIN/1.73M2 — SIGNIFICANT CHANGE UP
EOSINOPHIL # BLD AUTO: 0.34 K/UL — SIGNIFICANT CHANGE UP (ref 0–0.5)
EOSINOPHIL NFR BLD AUTO: 6.1 % — HIGH (ref 0–6)
GIANT PLATELETS BLD QL SMEAR: PRESENT — SIGNIFICANT CHANGE UP
GLUCOSE SERPL-MCNC: 86 MG/DL — SIGNIFICANT CHANGE UP (ref 70–99)
HCT VFR BLD CALC: 39.3 % — SIGNIFICANT CHANGE UP (ref 39–50)
HGB BLD-MCNC: 13.3 G/DL — SIGNIFICANT CHANGE UP (ref 13–17)
LYMPHOCYTES # BLD AUTO: 1.46 K/UL — SIGNIFICANT CHANGE UP (ref 1–3.3)
LYMPHOCYTES # BLD AUTO: 26.1 % — SIGNIFICANT CHANGE UP (ref 13–44)
MACROCYTES BLD QL: SIGNIFICANT CHANGE UP
MAGNESIUM SERPL-MCNC: 1.4 MG/DL — LOW (ref 1.6–2.6)
MANUAL SMEAR VERIFICATION: SIGNIFICANT CHANGE UP
MCHC RBC-ENTMCNC: 33.8 G/DL — SIGNIFICANT CHANGE UP (ref 32–36)
MCHC RBC-ENTMCNC: 36.3 PG — HIGH (ref 27–34)
MCV RBC AUTO: 107.4 FL — HIGH (ref 80–100)
MONOCYTES # BLD AUTO: 0.39 K/UL — SIGNIFICANT CHANGE UP (ref 0–0.9)
MONOCYTES NFR BLD AUTO: 7 % — SIGNIFICANT CHANGE UP (ref 2–14)
NEUTROPHILS # BLD AUTO: 2.49 K/UL — SIGNIFICANT CHANGE UP (ref 1.8–7.4)
NEUTROPHILS NFR BLD AUTO: 44.3 % — SIGNIFICANT CHANGE UP (ref 43–77)
NT-PROBNP SERPL-SCNC: 108 PG/ML — SIGNIFICANT CHANGE UP (ref 0–300)
OVALOCYTES BLD QL SMEAR: SLIGHT — SIGNIFICANT CHANGE UP
PLAT MORPH BLD: NORMAL — SIGNIFICANT CHANGE UP
PLATELET # BLD AUTO: 236 K/UL — SIGNIFICANT CHANGE UP (ref 150–400)
POLYCHROMASIA BLD QL SMEAR: SLIGHT — SIGNIFICANT CHANGE UP
POTASSIUM SERPL-MCNC: 4.4 MMOL/L — SIGNIFICANT CHANGE UP (ref 3.5–5.3)
POTASSIUM SERPL-SCNC: 4.4 MMOL/L — SIGNIFICANT CHANGE UP (ref 3.5–5.3)
PROT SERPL-MCNC: 6.6 G/DL — SIGNIFICANT CHANGE UP (ref 6.6–8.7)
RBC # BLD: 3.66 M/UL — LOW (ref 4.2–5.8)
RBC # FLD: 13.5 % — SIGNIFICANT CHANGE UP (ref 10.3–14.5)
RBC BLD AUTO: ABNORMAL
SODIUM SERPL-SCNC: 138 MMOL/L — SIGNIFICANT CHANGE UP (ref 135–145)
TARGETS BLD QL SMEAR: SLIGHT — SIGNIFICANT CHANGE UP
TROPONIN T, HIGH SENSITIVITY RESULT: 18 NG/L — SIGNIFICANT CHANGE UP (ref 0–51)
TROPONIN T, HIGH SENSITIVITY RESULT: 19 NG/L — SIGNIFICANT CHANGE UP (ref 0–51)
VARIANT LYMPHS # BLD: 13 % — HIGH (ref 0–6)
WBC # BLD: 5.61 K/UL — SIGNIFICANT CHANGE UP (ref 3.8–10.5)
WBC # FLD AUTO: 5.61 K/UL — SIGNIFICANT CHANGE UP (ref 3.8–10.5)

## 2025-01-20 PROCEDURE — 96374 THER/PROPH/DIAG INJ IV PUSH: CPT

## 2025-01-20 PROCEDURE — 71046 X-RAY EXAM CHEST 2 VIEWS: CPT | Mod: 26

## 2025-01-20 PROCEDURE — 83880 ASSAY OF NATRIURETIC PEPTIDE: CPT

## 2025-01-20 PROCEDURE — 71046 X-RAY EXAM CHEST 2 VIEWS: CPT

## 2025-01-20 PROCEDURE — 93005 ELECTROCARDIOGRAM TRACING: CPT

## 2025-01-20 PROCEDURE — 80053 COMPREHEN METABOLIC PANEL: CPT

## 2025-01-20 PROCEDURE — 99285 EMERGENCY DEPT VISIT HI MDM: CPT

## 2025-01-20 PROCEDURE — 83735 ASSAY OF MAGNESIUM: CPT

## 2025-01-20 PROCEDURE — 99053 MED SERV 10PM-8AM 24 HR FAC: CPT

## 2025-01-20 PROCEDURE — 36415 COLL VENOUS BLD VENIPUNCTURE: CPT

## 2025-01-20 PROCEDURE — 84484 ASSAY OF TROPONIN QUANT: CPT

## 2025-01-20 PROCEDURE — 85025 COMPLETE CBC W/AUTO DIFF WBC: CPT

## 2025-01-20 PROCEDURE — 93010 ELECTROCARDIOGRAM REPORT: CPT

## 2025-01-20 PROCEDURE — 96375 TX/PRO/DX INJ NEW DRUG ADDON: CPT

## 2025-01-20 PROCEDURE — 99285 EMERGENCY DEPT VISIT HI MDM: CPT | Mod: 25

## 2025-01-20 RX ORDER — MAGNESIUM SULFATE 500 MG/ML
2 INJECTION, SOLUTION INTRAMUSCULAR; INTRAVENOUS ONCE
Refills: 0 | Status: COMPLETED | OUTPATIENT
Start: 2025-01-20 | End: 2025-01-20

## 2025-01-20 RX ORDER — FUROSEMIDE 20 MG
1 TABLET ORAL
Qty: 30 | Refills: 0
Start: 2025-01-20 | End: 2025-02-18

## 2025-01-20 RX ORDER — FUROSEMIDE 20 MG
20 TABLET ORAL ONCE
Refills: 0 | Status: COMPLETED | OUTPATIENT
Start: 2025-01-20 | End: 2025-01-20

## 2025-01-20 RX ADMIN — Medication 20 MILLIGRAM(S): at 05:09

## 2025-01-20 RX ADMIN — MAGNESIUM SULFATE 100 GRAM(S): 500 INJECTION, SOLUTION INTRAMUSCULAR; INTRAVENOUS at 05:18

## 2025-01-20 NOTE — ED ADULT NURSE NOTE - OBJECTIVE STATEMENT
A&O x 4 , presents to ed with complaints of bilateral lower extremity swelling, patient reports hx of chf and non compliant with meds, denies sob, denies chest pain. Both legs noted with edema. Placed on telebox.  ekg done.

## 2025-01-20 NOTE — ED PROVIDER NOTE - ATTENDING CONTRIBUTION TO CARE
I have personally performed a history and physical examination of the patient and discussed management with the resident as well as the patient.  I reviewed the resident's note and agree with the documented findings and plan of care.  I have authored and modified critical sections of the Provider Note, including but not limited to HPI, Physical Exam and MDM.    HPI: 64-year-old male past medical history of ACS status post PCI with stent placement approximately 3 years ago, EtOH abuse, symptomatic bradycardia status post pacemaker placement, noncompliant with medications presents for 8 hours of bilateral lower extremity edema.  Patient states he is recently out of work and since then he has been off of his feet.  Denies any falls or trauma.  Denies pain at this time.  No loss of function or sensation.  No other complaints this time.    ROS:   General: No fever, no chills, no malaise, no fatigue  Respiratory: No cough, no dyspnea, no pleuritic chest pain  Cardiac: no chest pain, no palpitations, + edema, no jvd  Abdomen: No abdominal pain, no nausea, no vomiting, no diarrhea  : No dysuria, no increase frequency, no urgency, No discharge  Musculoskeletal: No myalgia, no arthralgia  Neurologic: No headache, no vertigo, no paresthesia, no focal deficits  Skin: No rash, no evidence of trauma  All other ROS are negative    PE:  General: NAD; well appearing; A&O x3   Head: NC/AT  Eyes: PERRL, EOMI  ENT: Airway patent, mmm.  Pulmonary: CTA b/l, symmetric breath sounds. No W/R/R.  Cardiac: s1s2, RRR, no M,G,R, No JVD  Abdomen: +BS, ND, NT, soft, no guarding, no rebound, no masses , no rigidity  : No CVA TTP, no suprapubic TTP  Back: Normal  spine  Extremities: FROM, symmetric pulses, capillary refill < 2 seconds, bilateral 2+ pedal pitting edema, 5/5 strength in b/l UE and LE  Skin: no rash or bruising  Neurologic: alert, speech clear, no focal deficits    MDM: 64-year-old male past medical history of ACS status post PCI with stent placement approximately 3 years ago, EtOH abuse, symptomatic bradycardia status post pacemaker placement, noncompliant with medications presents for 8 hours of bilateral lower extremity edema.  Likely new onset CHF.  Will obtain CBC, CMP, magnesium, TNI, BNP.  Independent review of EKG shows NSR without STEMI or T wave changes at 97 bpm, , QRS 72, QTc 426.  Will obtain CXR to evaluate for fluid overload status.  Benign pulmonary exam.  Lower suspicion PE, will obtain US duplex lower extremities.  Disposition pending results. I have personally performed a history and physical examination of the patient and discussed management with the resident as well as the patient.  I reviewed the resident's note and agree with the documented findings and plan of care.  I have authored and modified critical sections of the Provider Note, including but not limited to HPI, Physical Exam and MDM.    HPI: 64-year-old male past medical history of ACS status post PCI with stent placement approximately 3 years ago, EtOH abuse, symptomatic bradycardia status post pacemaker placement, noncompliant with medications presents for 8 hours of bilateral lower extremity edema.  Patient states he is recently out of work and since then he has been off of his feet.  Denies any falls or trauma.  Denies pain at this time.  No loss of function or sensation.  No other complaints this time.    ROS:   General: No fever, no chills, no malaise, no fatigue  Respiratory: No cough, no dyspnea, no pleuritic chest pain  Cardiac: no chest pain, no palpitations, + edema, no jvd  Abdomen: No abdominal pain, no nausea, no vomiting, no diarrhea  : No dysuria, no increase frequency, no urgency, No discharge  Musculoskeletal: No myalgia, no arthralgia  Neurologic: No headache, no vertigo, no paresthesia, no focal deficits  Skin: No rash, no evidence of trauma  All other ROS are negative    PE:  General: NAD; well appearing; A&O x3   Head: NC/AT  Eyes: PERRL, EOMI  ENT: Airway patent, mmm.  Pulmonary: CTA b/l, symmetric breath sounds. No W/R/R.  Cardiac: s1s2, RRR, no M,G,R, No JVD  Abdomen: +BS, ND, NT, soft, no guarding, no rebound, no masses , no rigidity  : No CVA TTP, no suprapubic TTP  Back: Normal  spine  Extremities: FROM, symmetric pulses, capillary refill < 2 seconds, bilateral 2+ pedal pitting edema, 5/5 strength in b/l UE and LE  Skin: no rash or bruising  Neurologic: alert, speech clear, no focal deficits    MDM: 64-year-old male past medical history of ACS status post PCI with stent placement approximately 3 years ago, EtOH abuse, symptomatic bradycardia status post pacemaker placement, noncompliant with medications presents for 8 hours of bilateral lower extremity edema.  Likely lymphedema vs new onset CHF.  Will obtain CBC, CMP, magnesium, TNI, BNP.  Independent review of EKG shows NSR without STEMI or T wave changes at 97 bpm, , QRS 72, QTc 426.  Will obtain CXR to evaluate for fluid overload status.  Benign pulmonary exam.  Lower suspicion PE, will obtain US duplex lower extremities.  Disposition pending results.

## 2025-01-20 NOTE — ED PROVIDER NOTE - PATIENT PORTAL LINK FT
You can access the FollowMyHealth Patient Portal offered by [facility] by registering at the following website: [email or web address]. By joining [email or web address]’s FollowMyHealth portal, you will also be able to view your health information using other applications (apps) compatible with our system. You can access the FollowMyHealth Patient Portal offered by [facility] by registering at the following website: [email or web address]. By joining [employer]’s FollowMyHealth portal, you will also be able to view your health information using other applications (apps) compatible with our system.

## 2025-01-20 NOTE — ED PROVIDER NOTE - NSFOLLOWUPINSTRUCTIONS_ED_ALL_ED_FT
You were seen and evaluated in the emergency department for your symptoms.  Your results did not show any evidence of acute cardiac disease.  Please see results in the following pages.    Your results are likely related to lymphedema.  Follow-up with your primary care doctor in 2 days to discuss your visit here today.  Is recommended that you take all of your recommended medications as prescribed including your aspirin due to your stent placement.    Lymphedema is swelling that is caused by the abnormal collection of lymph in the tissues under the skin. Lymph is fluid from the tissues in your body that is removed through the lymphatic system. This system is part of your body's defense system (immune system) and includes lymph nodes and lymph vessels. The lymph vessels collect and carry the excess fluid, fats, proteins, and wastes from the tissues of the body to the bloodstream. This system also works to clean and remove bacteria and waste products from the body.    Lymphedema occurs when the lymphatic system is blocked. When the lymph vessels or lymph nodes are blocked or damaged, lymph does not drain properly. This causes an abnormal buildup of lymph, which leads to swelling in the affected area. This may include the trunk area, or an arm or leg. Lymphedema cannot be cured by medicines, but various methods can be used to help reduce the swelling.    There are two types of lymphedema: primary lymphedema and secondary lymphedema.    What are the causes?  The cause of this condition depends on the type of lymphedema that you have.    Primary lymphedema is caused by the absence of lymph vessels or having abnormal lymph vessels at birth.  Secondary lymphedema occurs when lymph vessels are blocked or damaged. Secondary lymphedema is more common. Common causes of lymph vessel blockage include:    Skin infection, such as cellulitis.  Infection by parasites (filariasis).  Injury.  Radiation therapy.  Cancer.  Formation of scar tissue.  Surgery.    What are the signs or symptoms?  Symptoms of this condition include:    Swelling of the arm or leg.  A heavy or tight feeling in the arm or leg.  Swelling of the feet, toes, or fingers. Shoes or rings may fit more tightly than before.  Redness of the skin over the affected area.  Limited movement of the affected limb.  Sensitivity to touch or discomfort in the affected limb.    How is this diagnosed?  This condition may be diagnosed based on:    Your symptoms and medical history.  A physical exam.  Bioimpedance spectroscopy. In this test, painless electrical currents are used to measure fluid levels in your body.  Imaging tests, such as:    Lymphoscintigraphy. In this test, a low dose of a radioactive substance is injected to trace the flow of lymph through the lymph vessels.  MRI.  CT scan.  Duplex ultrasound. This test uses sound waves to produce images of the vessels and the blood flow on a screen.  Lymphangiography. In this test, a contrast dye is injected into the lymph vessel to help show blockages.    How is this treated?     Treatment for this condition may depend on the cause of your lymphedema. Treatment may include:    Complete decongestive therapy (CDT). This is done by a certified lymphedema therapist to reduce fluid congestion. This therapy includes:    Manual lymph drainage. This is a special massage technique that promotes lymph drainage out of a limb.  Skin care.  Compression wrapping of the affected area.  Specific exercises. Certain exercises can help fluid move out of the affected limb.  Compression. Various methods may be used to apply pressure to the affected limb to reduce the swelling. They include:    Wearing compression stockings or sleeves on the affected limb.  Wrapping the affected limb with special bandages.  Surgery. This is usually done for severe cases only. For example, surgery may be done if you have trouble moving the limb or if the swelling does not get better with other treatments.    If an underlying condition is causing the lymphedema, treatment for that condition will be done. For example, antibiotic medicines may be used to treat an infection.    Follow these instructions at home:      Self-care    The affected area is more likely to become injured or infected. Take these steps to help prevent infection:    Keep the affected area clean and dry.  Use approved creams or lotions to keep the skin moisturized.  Protect your skin from cuts:     Use gloves while cooking or gardening.  Do not walk barefoot.   If you shave the affected area, use an electric razor.  Do not wear tight clothes, shoes, or jewelry.  Eat a healthy diet that includes a lot of fruits and vegetables.        Activity    Exercise regularly as directed by your health care provider.  Do not sit with your legs crossed.  When possible, keep the affected limb raised (elevated) above the level of your heart.  Avoid carrying things with an arm that is affected by lymphedema.        General instructions    Wear compression stockings or sleeves as told by your health care provider.  Note any changes in size of the affected limb. You may be instructed to take regular measurements and keep track of them.  Take over-the-counter and prescription medicines only as told by your health care provider.  If you were prescribed an antibiotic medicine, take or apply it as told by your health care provider. Do not stop using the antibiotic even if you start to feel better.  Do not use heating pads or ice packs over the affected area.  Avoid having blood draws, IV insertions, or blood pressure checked on the affected limb.  Keep all follow-up visits as told by your health care provider. This is important.    Contact a health care provider if you:  Continue to have swelling in your limb.  Have a cut that does not heal.  Have redness or pain in the affected area.    Get help right away if you:  Have new swelling in your limb that comes on suddenly.  Develop purplish spots, rash or sores (lesions) on your affected limb.  Have shortness of breath.  Have a fever or chills.    Summary  Lymphedema is swelling that is caused by the abnormal collection of lymph in the tissues under the skin.  Lymph is fluid from the tissues in your body that is removed through the lymphatic system. This system collects and carries excess fluid, fats, proteins, and wastes from the tissues of the body to the bloodstream.  Lymphedema causes swelling, pain, and redness in the affected area. This may include the trunk area, or an arm or leg.  Treatment for this condition may depend on the cause of your lymphedema. Treatment may include complete decongestive therapy (CDT), compression methods, surgery, or treating the underlying cause.    ADDITIONAL NOTES AND INSTRUCTIONS    Please follow up with your Primary MD in 24-48 hr.  Seek immediate medical care for any new/worsening signs or symptoms. - Take lasix once a day.  - Wear compression stockings and stay active to help movement of fluid from your legs.   - Follow up with your cardiologist.    You were seen and evaluated in the emergency department for your symptoms.  Your results did not show any evidence of acute cardiac disease.  Please see results in the following pages.    Your results are likely related to lymphedema.  Follow-up with your primary care doctor in 2 days to discuss your visit here today.  Is recommended that you take all of your recommended medications as prescribed including your aspirin due to your stent placement.    Lymphedema is swelling that is caused by the abnormal collection of lymph in the tissues under the skin. Lymph is fluid from the tissues in your body that is removed through the lymphatic system. This system is part of your body's defense system (immune system) and includes lymph nodes and lymph vessels. The lymph vessels collect and carry the excess fluid, fats, proteins, and wastes from the tissues of the body to the bloodstream. This system also works to clean and remove bacteria and waste products from the body.    Lymphedema occurs when the lymphatic system is blocked. When the lymph vessels or lymph nodes are blocked or damaged, lymph does not drain properly. This causes an abnormal buildup of lymph, which leads to swelling in the affected area. This may include the trunk area, or an arm or leg. Lymphedema cannot be cured by medicines, but various methods can be used to help reduce the swelling.    There are two types of lymphedema: primary lymphedema and secondary lymphedema.    What are the causes?  The cause of this condition depends on the type of lymphedema that you have.    Primary lymphedema is caused by the absence of lymph vessels or having abnormal lymph vessels at birth.  Secondary lymphedema occurs when lymph vessels are blocked or damaged. Secondary lymphedema is more common. Common causes of lymph vessel blockage include:    Skin infection, such as cellulitis.  Infection by parasites (filariasis).  Injury.  Radiation therapy.  Cancer.  Formation of scar tissue.  Surgery.    What are the signs or symptoms?  Symptoms of this condition include:    Swelling of the arm or leg.  A heavy or tight feeling in the arm or leg.  Swelling of the feet, toes, or fingers. Shoes or rings may fit more tightly than before.  Redness of the skin over the affected area.  Limited movement of the affected limb.  Sensitivity to touch or discomfort in the affected limb.    How is this diagnosed?  This condition may be diagnosed based on:    Your symptoms and medical history.  A physical exam.  Bioimpedance spectroscopy. In this test, painless electrical currents are used to measure fluid levels in your body.  Imaging tests, such as:    Lymphoscintigraphy. In this test, a low dose of a radioactive substance is injected to trace the flow of lymph through the lymph vessels.  MRI.  CT scan.  Duplex ultrasound. This test uses sound waves to produce images of the vessels and the blood flow on a screen.  Lymphangiography. In this test, a contrast dye is injected into the lymph vessel to help show blockages.    How is this treated?     Treatment for this condition may depend on the cause of your lymphedema. Treatment may include:    Complete decongestive therapy (CDT). This is done by a certified lymphedema therapist to reduce fluid congestion. This therapy includes:    Manual lymph drainage. This is a special massage technique that promotes lymph drainage out of a limb.  Skin care.  Compression wrapping of the affected area.  Specific exercises. Certain exercises can help fluid move out of the affected limb.  Compression. Various methods may be used to apply pressure to the affected limb to reduce the swelling. They include:    Wearing compression stockings or sleeves on the affected limb.  Wrapping the affected limb with special bandages.  Surgery. This is usually done for severe cases only. For example, surgery may be done if you have trouble moving the limb or if the swelling does not get better with other treatments.    If an underlying condition is causing the lymphedema, treatment for that condition will be done. For example, antibiotic medicines may be used to treat an infection.    Follow these instructions at home:      Self-care    The affected area is more likely to become injured or infected. Take these steps to help prevent infection:    Keep the affected area clean and dry.  Use approved creams or lotions to keep the skin moisturized.  Protect your skin from cuts:     Use gloves while cooking or gardening.  Do not walk barefoot.   If you shave the affected area, use an electric razor.  Do not wear tight clothes, shoes, or jewelry.  Eat a healthy diet that includes a lot of fruits and vegetables.        Activity    Exercise regularly as directed by your health care provider.  Do not sit with your legs crossed.  When possible, keep the affected limb raised (elevated) above the level of your heart.  Avoid carrying things with an arm that is affected by lymphedema.        General instructions    Wear compression stockings or sleeves as told by your health care provider.  Note any changes in size of the affected limb. You may be instructed to take regular measurements and keep track of them.  Take over-the-counter and prescription medicines only as told by your health care provider.  If you were prescribed an antibiotic medicine, take or apply it as told by your health care provider. Do not stop using the antibiotic even if you start to feel better.  Do not use heating pads or ice packs over the affected area.  Avoid having blood draws, IV insertions, or blood pressure checked on the affected limb.  Keep all follow-up visits as told by your health care provider. This is important.    Contact a health care provider if you:  Continue to have swelling in your limb.  Have a cut that does not heal.  Have redness or pain in the affected area.    Get help right away if you:  Have new swelling in your limb that comes on suddenly.  Develop purplish spots, rash or sores (lesions) on your affected limb.  Have shortness of breath.  Have a fever or chills.    Summary  Lymphedema is swelling that is caused by the abnormal collection of lymph in the tissues under the skin.  Lymph is fluid from the tissues in your body that is removed through the lymphatic system. This system collects and carries excess fluid, fats, proteins, and wastes from the tissues of the body to the bloodstream.  Lymphedema causes swelling, pain, and redness in the affected area. This may include the trunk area, or an arm or leg.  Treatment for this condition may depend on the cause of your lymphedema. Treatment may include complete decongestive therapy (CDT), compression methods, surgery, or treating the underlying cause.    ADDITIONAL NOTES AND INSTRUCTIONS    Please follow up with your Primary MD in 24-48 hr.  Seek immediate medical care for any new/worsening signs or symptoms.

## 2025-01-20 NOTE — ED PROVIDER NOTE - ATTENDING WITH...
Telemetry Bed?: Yes   Admitting Physician: NAOMI DICKENS [[id number]]   Is this a telephone or verbal order?: This is a telephone order from the admitting physician   Transferring Patient to? Only adjust for [facility] ([facility] [[id number]]  
Resident
